# Patient Record
Sex: MALE | Race: WHITE | NOT HISPANIC OR LATINO | Employment: OTHER | URBAN - METROPOLITAN AREA
[De-identification: names, ages, dates, MRNs, and addresses within clinical notes are randomized per-mention and may not be internally consistent; named-entity substitution may affect disease eponyms.]

---

## 2017-03-15 ENCOUNTER — ANESTHESIA (OUTPATIENT)
Dept: GASTROENTEROLOGY | Facility: HOSPITAL | Age: 67
End: 2017-03-15

## 2017-03-15 ENCOUNTER — ANESTHESIA EVENT (OUTPATIENT)
Dept: GASTROENTEROLOGY | Facility: HOSPITAL | Age: 67
End: 2017-03-15

## 2017-03-15 ENCOUNTER — HOSPITAL ENCOUNTER (OUTPATIENT)
Facility: HOSPITAL | Age: 67
Setting detail: HOSPITAL OUTPATIENT SURGERY
Discharge: HOME | End: 2017-03-15
Attending: SURGERY | Admitting: SURGERY

## 2017-03-15 VITALS
BODY MASS INDEX: 44.2 KG/M2 | WEIGHT: 298.4 LBS | DIASTOLIC BLOOD PRESSURE: 78 MMHG | RESPIRATION RATE: 18 BRPM | HEIGHT: 69 IN | HEART RATE: 76 BPM | OXYGEN SATURATION: 98 % | SYSTOLIC BLOOD PRESSURE: 139 MMHG | TEMPERATURE: 98.3 F

## 2017-03-15 DIAGNOSIS — Z86.010 HISTORY OF COLON POLYPS: ICD-10-CM

## 2017-03-15 PROCEDURE — 25010000002 PROPOFOL 10 MG/ML EMULSION: Performed by: ANESTHESIOLOGY

## 2017-03-15 PROCEDURE — 45380 COLONOSCOPY AND BIOPSY: CPT | Performed by: SURGERY

## 2017-03-15 PROCEDURE — 88305 TISSUE EXAM BY PATHOLOGIST: CPT | Performed by: SURGERY

## 2017-03-15 PROCEDURE — 45385 COLONOSCOPY W/LESION REMOVAL: CPT | Performed by: SURGERY

## 2017-03-15 RX ORDER — LIDOCAINE HYDROCHLORIDE 20 MG/ML
INJECTION, SOLUTION INFILTRATION; PERINEURAL AS NEEDED
Status: DISCONTINUED | OUTPATIENT
Start: 2017-03-15 | End: 2017-03-15 | Stop reason: SURG

## 2017-03-15 RX ORDER — HYDROMORPHONE HYDROCHLORIDE 4 MG/1
4 TABLET ORAL EVERY 6 HOURS PRN
Status: ON HOLD | COMMUNITY
End: 2017-12-17

## 2017-03-15 RX ORDER — SODIUM CHLORIDE, SODIUM LACTATE, POTASSIUM CHLORIDE, CALCIUM CHLORIDE 600; 310; 30; 20 MG/100ML; MG/100ML; MG/100ML; MG/100ML
30 INJECTION, SOLUTION INTRAVENOUS CONTINUOUS PRN
Status: DISCONTINUED | OUTPATIENT
Start: 2017-03-15 | End: 2017-03-15 | Stop reason: HOSPADM

## 2017-03-15 RX ORDER — LEVOTHYROXINE SODIUM 0.03 MG/1
125 TABLET ORAL DAILY
COMMUNITY
End: 2017-12-17 | Stop reason: SDUPTHER

## 2017-03-15 RX ORDER — SERTRALINE HYDROCHLORIDE 100 MG/1
100 TABLET, FILM COATED ORAL DAILY
COMMUNITY

## 2017-03-15 RX ORDER — PROPOFOL 10 MG/ML
VIAL (ML) INTRAVENOUS AS NEEDED
Status: DISCONTINUED | OUTPATIENT
Start: 2017-03-15 | End: 2017-03-15 | Stop reason: SURG

## 2017-03-15 RX ORDER — HYDROCHLOROTHIAZIDE 25 MG/1
25 TABLET ORAL DAILY
COMMUNITY

## 2017-03-15 RX ORDER — TRAZODONE HYDROCHLORIDE 150 MG/1
150 TABLET ORAL NIGHTLY
COMMUNITY

## 2017-03-15 RX ORDER — SIMVASTATIN 20 MG
40 TABLET ORAL NIGHTLY
COMMUNITY
End: 2017-12-17 | Stop reason: SDUPTHER

## 2017-03-15 RX ORDER — ISOSORBIDE MONONITRATE 60 MG/1
30 TABLET, EXTENDED RELEASE ORAL DAILY
COMMUNITY
End: 2017-12-17 | Stop reason: SDUPTHER

## 2017-03-15 RX ORDER — PROBENECID 500 MG/1
500 TABLET, FILM COATED ORAL 2 TIMES DAILY
COMMUNITY

## 2017-03-15 RX ADMIN — LIDOCAINE HYDROCHLORIDE 100 MG: 20 INJECTION, SOLUTION INFILTRATION; PERINEURAL at 07:55

## 2017-03-15 RX ADMIN — PROPOFOL 300 MG: 10 INJECTION, EMULSION INTRAVENOUS at 07:55

## 2017-03-15 RX ADMIN — SODIUM CHLORIDE, POTASSIUM CHLORIDE, SODIUM LACTATE AND CALCIUM CHLORIDE 30 ML/HR: 600; 310; 30; 20 INJECTION, SOLUTION INTRAVENOUS at 07:52

## 2017-03-15 RX ADMIN — SODIUM CHLORIDE, POTASSIUM CHLORIDE, SODIUM LACTATE AND CALCIUM CHLORIDE: 600; 310; 30; 20 INJECTION, SOLUTION INTRAVENOUS at 07:55

## 2017-03-15 NOTE — PLAN OF CARE
Problem: Patient Care Overview (Adult)  Goal: Plan of Care Review  Outcome: Ongoing (interventions implemented as appropriate)    03/15/17 0711   Coping/Psychosocial Response Interventions   Plan Of Care Reviewed With patient   Patient Care Overview   Progress no change       Goal: Adult Individualization and Mutuality  Outcome: Ongoing (interventions implemented as appropriate)  Goal: Discharge Needs Assessment  Outcome: Ongoing (interventions implemented as appropriate)    03/15/17 0711   Discharge Needs Assessment   Concerns To Be Addressed no discharge needs identified   Discharge Disposition home or self-care   Living Environment   Transportation Available car         Problem: GI Endoscopy (Adult)  Goal: Signs and Symptoms of Listed Potential Problems Will be Absent or Manageable (GI Endoscopy)  Outcome: Ongoing (interventions implemented as appropriate)    03/15/17 0711   GI Endoscopy   Problems Assessed (GI Endoscopy) all   Problems Present (GI Endoscopy) none

## 2017-03-15 NOTE — ANESTHESIA PREPROCEDURE EVALUATION
Anesthesia Evaluation     Patient summary reviewed and Nursing notes reviewed      Airway   Mallampati: II  TM distance: >3 FB  Neck ROM: full  no difficulty expected  Dental - normal exam     Pulmonary - normal exam   (+) sleep apnea,   Cardiovascular - normal exam    (+) hypertension,       Neuro/Psych  GI/Hepatic/Renal/Endo    (+) obesity, morbid obesity,     Musculoskeletal     Abdominal  - normal exam   Substance History      OB/GYN          Other   (+) arthritis                                 Anesthesia Plan    ASA 3     MAC     intravenous induction   Anesthetic plan and risks discussed with patient.

## 2017-03-15 NOTE — OP NOTE
PREOPERATIVE DIAGNOSIS:  High-risk screening secondary to personal history of polyps  Diverticulosis    POSTOPERATIVE DIAGNOSIS AND FINDINGS:  7 mm polyp transverse colon  3 mm polyp rectum    PROCEDURE:  Colonoscopy to terminal ileum with snare polypectomy of transverse colon polyp and cold biopsy removal of rectal polyp    SURGEON:  Elian Carnes MD    ANESTHESIA:  MAC    DESCRIPTION:  In decubitus position digital rectal exam was normal. Colonoscope inserted under direct visualization of lumen to ileocolic anastomosis.  Anastomosis was traversed and terminal ileum was grossly normal.  Scope slowly withdrawn circumferentially examining all mucosal surfaces.  Polyp in the transverse colon proximally 7 mm removed with the cold snare and retrieved, good hemostasis at the site.  Scattered diverticulosis was seen in the sigmoid colon.  3 mm polyp in the rectum was removed with cold biopsy forceps.  Tolerated well.    RECOMMENDATION FOR FUTURE SURVEILLANCE:  To be determined based on polyp pathology and issued as separate report    Elian Carnes M.D.

## 2017-03-15 NOTE — ANESTHESIA POSTPROCEDURE EVALUATION
Patient: Nicanor TAN Arlt    Procedure Summary     Date Anesthesia Start Anesthesia Stop Room / Location    03/15/17 0756 0823  [facility] /  [facility]       Procedure Diagnosis Surgeon Provider    COLONOSCOPY TO ANASTAMOSIS AND TI WITH COLD SNARE POLYPECTOMY  (N/A ) History of colon polyps  (History of colon polyps [Z86.010]) MD Lai Hanna MD          Anesthesia Type: MAC  Last vitals  /67 (03/15/17 0740)    Temp 36.8 °C (98.3 °F) (03/15/17 0740)    Pulse 55 (03/15/17 0740)   Resp 16 (03/15/17 0740)    SpO2 97 % (03/15/17 0740)      Post Anesthesia Care and Evaluation    Patient location during evaluation: PACU  Patient participation: complete - patient participated  Level of consciousness: awake and alert  Pain management: adequate  Airway patency: patent  Anesthetic complications: No anesthetic complications    Cardiovascular status: acceptable  Respiratory status: acceptable  Hydration status: acceptable

## 2017-03-16 LAB
CYTO UR: NORMAL
LAB AP CASE REPORT: NORMAL
Lab: NORMAL
PATH REPORT.FINAL DX SPEC: NORMAL
PATH REPORT.GROSS SPEC: NORMAL

## 2017-03-17 ENCOUNTER — DOCUMENTATION (OUTPATIENT)
Dept: SURGERY | Facility: CLINIC | Age: 67
End: 2017-03-17

## 2017-03-17 NOTE — PROGRESS NOTES
Colonoscopy 3/15/17    Findings:   Transverse colon polyp-tubulovillous  Rectal polyp-hyperplastic    Recommendations: 3 year surveillance    Elian Carnes M.D.

## 2017-03-20 ENCOUNTER — TELEPHONE (OUTPATIENT)
Dept: SURGERY | Facility: CLINIC | Age: 67
End: 2017-03-20

## 2017-03-20 NOTE — TELEPHONE ENCOUNTER
----- Message from Elian Carnes MD sent at 3/17/2017 11:09 AM EDT -----  Please inform him that he had 2 benign polyps.  Based on the type of polyp (tubulovillous), however, he needs follow-up colonoscopy in 3 years-put in computer for reminder

## 2017-04-05 ENCOUNTER — APPOINTMENT (OUTPATIENT)
Dept: GENERAL RADIOLOGY | Facility: HOSPITAL | Age: 67
End: 2017-04-05

## 2017-04-05 ENCOUNTER — HOSPITAL ENCOUNTER (EMERGENCY)
Facility: HOSPITAL | Age: 67
Discharge: HOME | End: 2017-04-05
Attending: EMERGENCY MEDICINE | Admitting: EMERGENCY MEDICINE

## 2017-04-05 VITALS
HEIGHT: 69 IN | RESPIRATION RATE: 20 BRPM | TEMPERATURE: 98.1 F | BODY MASS INDEX: 42.21 KG/M2 | SYSTOLIC BLOOD PRESSURE: 115 MMHG | HEART RATE: 56 BPM | DIASTOLIC BLOOD PRESSURE: 60 MMHG | OXYGEN SATURATION: 98 % | WEIGHT: 285 LBS
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
## 2017-04-05 DIAGNOSIS — S22.31XA CLOSED FRACTURE OF ONE RIB OF RIGHT SIDE, INITIAL ENCOUNTER: Primary | ICD-10-CM

## 2017-04-05 LAB
BILIRUB UR QL STRIP: NEGATIVE
CLARITY UR: CLEAR
COLOR UR: YELLOW
GLUCOSE UR STRIP-MCNC: NEGATIVE MG/DL
HGB UR QL STRIP.AUTO: NEGATIVE
KETONES UR QL STRIP: NEGATIVE
LEUKOCYTE ESTERASE UR QL STRIP.AUTO: NEGATIVE
NITRITE UR QL STRIP: NEGATIVE
PH UR STRIP.AUTO: 5.5 [PH] (ref 5–8)
PROT UR QL STRIP: NEGATIVE
SP GR UR STRIP: 1.01 (ref 1–1.03)
UROBILINOGEN UR QL STRIP: NORMAL

## 2017-04-05 PROCEDURE — 72072 X-RAY EXAM THORAC SPINE 3VWS: CPT

## 2017-04-05 PROCEDURE — 71101 X-RAY EXAM UNILAT RIBS/CHEST: CPT

## 2017-04-05 PROCEDURE — 81003 URINALYSIS AUTO W/O SCOPE: CPT | Performed by: PHYSICIAN ASSISTANT

## 2017-04-05 PROCEDURE — 72110 X-RAY EXAM L-2 SPINE 4/>VWS: CPT

## 2017-04-05 PROCEDURE — 94799 UNLISTED PULMONARY SVC/PX: CPT

## 2017-04-05 PROCEDURE — 99284 EMERGENCY DEPT VISIT MOD MDM: CPT

## 2017-04-05 RX ORDER — OXYCODONE HYDROCHLORIDE AND ACETAMINOPHEN 5; 325 MG/1; MG/1
1 TABLET ORAL ONCE
Status: COMPLETED | OUTPATIENT
Start: 2017-04-05 | End: 2017-04-05

## 2017-04-05 RX ADMIN — OXYCODONE HYDROCHLORIDE AND ACETAMINOPHEN 1 TABLET: 5; 325 TABLET ORAL at 13:40

## 2017-04-05 NOTE — ED PROVIDER NOTES
"EMERGENCY DEPARTMENT ENCOUNTER    CHIEF COMPLAINT  Chief Complaint: Fall  History given by: Patient  History limited by: N/A  Room Number: 24/24  PMD: Ariel Betancourt MD      HPI:  Pt is a 67 y.o. male who presents with R flank and R rib pain after caro experienced a fall 4 days ago. Pt states that he was in the shower when he slipped and fell in the shower hitting his ribs on the side of the porcelain tub. Pt states that his PCP is out of town and he could no longer stand the pain. Pt states that his pain is worsened by deep breathing. Pt denies head trauma or LOC.    Duration: 4 days   Timing: Gradual  Location: R flank and R rib  Radiation: Does not radiate  Quality: \"pain\"  Intensity/Severity: Moderate  Progression: Worsening  Associated Symptoms: None  Aggravating Factors: Deep breathing  Alleviating Factors: None  Previous Episodes: None  Treatment before arrival: None    MEDICAL RECORD REVIEW  Hx of hypertension and bilateral knee replacements. Pt is not on any blood thinners but does take Dilaudid 4mg tabs every 4 hours. 3/15/2017 Dr. Carnes did a colonoscopy.    PAST MEDICAL HISTORY  Active Ambulatory Problems     Diagnosis Date Noted   • History of colon polyps 03/15/2017     Resolved Ambulatory Problems     Diagnosis Date Noted   • No Resolved Ambulatory Problems     Past Medical History:   Diagnosis Date   • Arthritis    • Disease of thyroid gland    • Gout    • Hyperlipemia    • Hypertension    • Sleep apnea        PAST SURGICAL HISTORY  Past Surgical History:   Procedure Laterality Date   • ANKLE SURGERY     • COLON SURGERY     • COLONOSCOPY N/A 3/15/2017    Procedure: COLONOSCOPY TO ANASTAMOSIS AND TI WITH COLD SNARE POLYPECTOMY ;  Surgeon: Elian Carnes MD;  Location: [facility] ENDOSCOPY;  Service:    • JOINT REPLACEMENT Bilateral     Knee   • KNEE SURGERY     • NOSE SURGERY         FAMILY HISTORY  History reviewed. No pertinent family history.    SOCIAL HISTORY  Social History     Social History   • " Marital status: Single     Spouse name: N/A   • Number of children: N/A   • Years of education: N/A     Occupational History   • Not on file.     Social History Main Topics   • Smoking status: Never Smoker   • Smokeless tobacco: Not on file   • Alcohol use No      Comment: socially   • Drug use: No   • Sexual activity: Defer     Other Topics Concern   • Not on file     Social History Narrative   • No narrative on file       ALLERGIES  Keflex [cephalexin]    REVIEW OF SYSTEMS  Review of Systems   Constitutional: Negative for chills and fever.   HENT: Negative for congestion and sore throat.    Respiratory: Negative for cough and shortness of breath.    Cardiovascular: Negative for chest pain and leg swelling.   Gastrointestinal: Negative for abdominal pain, diarrhea and vomiting.   Genitourinary: Positive for flank pain (R). Negative for decreased urine volume and dysuria.   Musculoskeletal: Negative for neck pain.        R rib pain   Skin: Negative for pallor and rash.   Neurological: Negative for weakness, numbness and headaches.   All other systems reviewed and are negative.      PHYSICAL EXAM  ED Triage Vitals   Temp Heart Rate Resp BP SpO2   04/05/17 0915 04/05/17 0915 04/05/17 0923 04/05/17 0922 04/05/17 0915   98.1 °F (36.7 °C) 67 18 113/74 94 %      Temp src Heart Rate Source Patient Position BP Location FiO2 (%)   04/05/17 0915 04/05/17 1201 -- -- --   Tympanic Monitor          Physical Exam   Constitutional: He is oriented to person, place, and time and well-developed, well-nourished, and in no distress. No distress.   HENT:   Head: Normocephalic and atraumatic.   Mouth/Throat: Oropharynx is clear and moist.   Eyes: EOM are normal. Pupils are equal, round, and reactive to light.   Conjunctiva are pink   Neck: Normal range of motion. Neck supple.   Cardiovascular: Normal rate, regular rhythm and normal heart sounds.    Pulmonary/Chest: Effort normal and breath sounds normal. No respiratory distress. He has  no wheezes. He exhibits tenderness (R lower lateral ribs).   Abdominal: Soft. He exhibits no distension. There is no tenderness. There is no rebound and no guarding.   No RUQ abdominal tenderness, gastric band palpated in epigastric area   Musculoskeletal: Normal range of motion. He exhibits no edema.        Cervical back: He exhibits no tenderness.        Thoracic back: He exhibits no tenderness.        Lumbar back: He exhibits no tenderness.   Positive R paraspinal tenderness   Lymphadenopathy:     He has no cervical adenopathy.   Neurological: He is alert and oriented to person, place, and time.   Skin: Skin is warm and dry. Bruising (purple to yellowish to R flank) noted. No rash noted. No pallor.   Psychiatric: Mood, memory, affect and judgment normal.   Nursing note and vitals reviewed.      LAB RESULTS  Recent Results (from the past 24 hour(s))   Urinalysis With / Culture If Indicated    Collection Time: 04/05/17  2:56 PM   Result Value Ref Range    Color, UA Yellow Yellow, Straw    Appearance, UA Clear Clear    pH, UA 5.5 5.0 - 8.0    Specific Gravity, UA 1.013 1.005 - 1.030    Glucose, UA Negative Negative    Ketones, UA Negative Negative    Bilirubin, UA Negative Negative    Blood, UA Negative Negative    Protein, UA Negative Negative    Leuk Esterase, UA Negative Negative    Nitrite, UA Negative Negative    Urobilinogen, UA 0.2 E.U./dL 0.2 - 1.0 E.U./dL       I ordered the above labs and reviewed the results.    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 No fracture or other posttraumatic deformity is evident.          XR Spine Lumbar 4+ View   Final Result      XR Ribs Right With PA Chest   Final Result        XR Lumbar Spine - Shows There is moderate disc space narrowing at all the lumbar disc levels is most prominent at L4-L5 and L5-S1. There are  prominent bridging osteophytes anteriorly at the L2 and L3-L4 levels. All of the vertebral bodies are normal in height. There is satisfactory alignment. There is no evidence of recent or old fracture. Interpreted by radiologist and reviewed by me.    XR Right Ribs - Shows An AP chest and multiple oblique views of the ribs of the right hemithorax were obtained. There is a subtle area of acute nondisplaced incomplete fracture through the anterior tip of the right eighth rib. No other fractures are identified. The lungs are fairly well-expanded and are free of infiltrates. There is no pneumothorax. Interpreted by radiologist and reviewed by me.    I ordered the above noted radiological studies and reviewed the images on the PACS system.      COURSE & MEDICAL DECISION MAKING  Pertinent Labs and Imaging studies that were ordered and reviewed are noted above.  Results were reviewed/discussed with the patient and they were also made aware of online assess.  Pt also made aware that some labs, such as cultures, will not be resulted during ER visit and follow up with PMD is necessary.       PROGRESS AND CONSULTS    Progress Notes:    1443- Reviewed pt's history and workup with Dr. Reno.  After a bedside evaluation; Dr. Reno agrees with the plan of care.    1550-  The patient's history, physical exam, and lab and image findings were discussed with the physician, who also performed a face to face history and physical exam.  I discussed all results and noted any abnormalities with patient.  Discussed absoute need to recheck abnormalities with their family physician.  I answered any of the patient's questions.  Discussed plan for discharge, as there is no emergent indication for admission.  Pt is agreeable and understands need for follow up and repeat testing.  Pt is aware that discharge does not mean that nothing is wrong but it indicates no emergency is present and they must continue care with their family physician.  Pt is  "discharged with instructions to follow up with primary care doctor to have their blood pressure rechecked.       MEDICATIONS GIVEN IN ER  Medications   oxyCODONE-acetaminophen (PERCOCET) 5-325 MG per tablet 1 tablet (1 tablet Oral Given 4/5/17 1340)       /65  Pulse 54  Temp 98.1 °F (36.7 °C) (Tympanic)   Resp 19  Ht 69\" (175.3 cm)  Wt 285 lb (129 kg)  SpO2 96%  BMI 42.09 kg/m2      DIAGNOSIS  Final diagnoses:   Closed fracture of one rib of right side, initial encounter   Fall at home, initial encounter       FOLLOW UP   Ariel Betancourt MD  [address] [address]  [phone]            RX     Medication List      Notice     No changes were made to your prescriptions during this visit.          I personally scribed for Poonam Piña PA-C on 4/5/2017 at 1:22 PM.  Electronically signed by Jonathan Mata on 4/5/2017 at time 1:22 PM     Jonathan Mata  04/05/17 2731       Poonam Piña PA-C  04/05/17 1551    "

## 2017-04-05 NOTE — DISCHARGE INSTRUCTIONS
PLEASE READ AND REVIEW ALL DISCHARGE INSTRUCTIONS.     Please follow up with your primary care physician for any further evaluation/treatment and further management of your blood pressure.     Recheck in emergency department for any worsening and/or concerning symptoms.     Take all prescribed medicine as written and continue chronic medication.    Use your incentive spirometer as directed.

## 2017-04-05 NOTE — ED NOTES
Pt states he fell Sunday in his bathroom, striking his R back/side on tub ledge. Denies hitting head.     Tino Hale RN  04/05/17 1866

## 2017-04-05 NOTE — ED PROVIDER NOTES
Pt states that about 3 days ago, he lost his balance and fell, injuring his right mid back on the side of his bathtub. He denies blow to head, loss of consciousness, neck pain, abd pain, anterior chest pain, lower back pain, bladder dysfunction, bowel dysfunction, sensory loss, motor loss, trouble breathing, nausea, vomiting, and sustaining any other injury.        Limited physical exam: bruising/TTpalp over the right lateral lower ribs, lungs are clear to auscultation bilaterally, abdomen: nontender to palpation      L-spine xray and t-spine xray both show no acute process. Right ribs and chest xray shows subtle area of acute nondisplaced incomplete fracture through the anterior tip of the right eighth rib and no pneumothorax. UA shows no hematuria. Plan is to discharge pt with incentive spirometry. He has dilaudid at home for pain. Will have pt to follow up with PMD for recheck. RTER warnings given.     I supervised care provided by the midlevel provider.  We have discussed this patient's history, physical exam, and treatment plan.  I have reviewed the note and personally saw and examined the patient and agree with the plan of care.    Documentation assistance provided by amari Schuler for Dr Reno. Information recorded by the scribe was done at my direction and has been verified and validated by me.        Ruth Schuler  04/05/17 1549       Brie Reno MD  04/06/17 7045

## 2017-11-06 ENCOUNTER — APPOINTMENT (OUTPATIENT)
Dept: CT IMAGING | Facility: HOSPITAL | Age: 67
End: 2017-11-06

## 2017-11-06 ENCOUNTER — APPOINTMENT (OUTPATIENT)
Dept: GENERAL RADIOLOGY | Facility: HOSPITAL | Age: 67
End: 2017-11-06

## 2017-11-06 ENCOUNTER — HOSPITAL ENCOUNTER (INPATIENT)
Facility: HOSPITAL | Age: 67
LOS: 5 days | Discharge: HOME | End: 2017-11-12
Attending: EMERGENCY MEDICINE | Admitting: SPECIALIST

## 2017-11-06 DIAGNOSIS — K80.50 CHOLEDOCHOLITHIASIS: ICD-10-CM

## 2017-11-06 DIAGNOSIS — K85.10 ACUTE PANCREATITIS DUE TO CALCULUS OF COMMON BILE DUCT: Primary | ICD-10-CM

## 2017-11-06 LAB
ALBUMIN SERPL-MCNC: 3.8 G/DL (ref 3.5–5.2)
ALBUMIN/GLOB SERPL: 1.1 G/DL
ALP SERPL-CCNC: 61 U/L (ref 39–117)
ALT SERPL W P-5'-P-CCNC: 88 U/L (ref 1–41)
ANION GAP SERPL CALCULATED.3IONS-SCNC: 14 MMOL/L
AST SERPL-CCNC: 183 U/L (ref 1–40)
BASOPHILS # BLD AUTO: 0 10*3/MM3 (ref 0–0.2)
BASOPHILS NFR BLD AUTO: 0 % (ref 0–1.5)
BILIRUB SERPL-MCNC: 0.9 MG/DL (ref 0.1–1.2)
BUN BLD-MCNC: 20 MG/DL (ref 8–23)
BUN/CREAT SERPL: 20.8 (ref 7–25)
CALCIUM SPEC-SCNC: 9.5 MG/DL (ref 8.6–10.5)
CHLORIDE SERPL-SCNC: 100 MMOL/L (ref 98–107)
CO2 SERPL-SCNC: 30 MMOL/L (ref 22–29)
CREAT BLD-MCNC: 0.96 MG/DL (ref 0.76–1.27)
DEPRECATED RDW RBC AUTO: 43.3 FL (ref 37–54)
EOSINOPHIL # BLD AUTO: 0.02 10*3/MM3 (ref 0–0.7)
EOSINOPHIL NFR BLD AUTO: 0.3 % (ref 0.3–6.2)
ERYTHROCYTE [DISTWIDTH] IN BLOOD BY AUTOMATED COUNT: 12.7 % (ref 11.5–14.5)
GFR SERPL CREATININE-BSD FRML MDRD: 78 ML/MIN/1.73
GLOBULIN UR ELPH-MCNC: 3.5 GM/DL
GLUCOSE BLD-MCNC: 146 MG/DL (ref 65–99)
HCT VFR BLD AUTO: 42.9 % (ref 40.4–52.2)
HGB BLD-MCNC: 14.1 G/DL (ref 13.7–17.6)
IMM GRANULOCYTES # BLD: 0.02 10*3/MM3 (ref 0–0.03)
IMM GRANULOCYTES NFR BLD: 0.3 % (ref 0–0.5)
LYMPHOCYTES # BLD AUTO: 0.76 10*3/MM3 (ref 0.9–4.8)
LYMPHOCYTES NFR BLD AUTO: 11.5 % (ref 19.6–45.3)
MCH RBC QN AUTO: 30.9 PG (ref 27–32.7)
MCHC RBC AUTO-ENTMCNC: 32.9 G/DL (ref 32.6–36.4)
MCV RBC AUTO: 94.1 FL (ref 79.8–96.2)
MONOCYTES # BLD AUTO: 0.5 10*3/MM3 (ref 0.2–1.2)
MONOCYTES NFR BLD AUTO: 7.5 % (ref 5–12)
NEUTROPHILS # BLD AUTO: 5.33 10*3/MM3 (ref 1.9–8.1)
NEUTROPHILS NFR BLD AUTO: 80.4 % (ref 42.7–76)
PLATELET # BLD AUTO: 120 10*3/MM3 (ref 140–500)
PMV BLD AUTO: 10.5 FL (ref 6–12)
POTASSIUM BLD-SCNC: 3.6 MMOL/L (ref 3.5–5.2)
PROT SERPL-MCNC: 7.3 G/DL (ref 6–8.5)
RBC # BLD AUTO: 4.56 10*6/MM3 (ref 4.6–6)
SODIUM BLD-SCNC: 144 MMOL/L (ref 136–145)
TROPONIN T SERPL-MCNC: <0.01 NG/ML (ref 0–0.03)
WBC NRBC COR # BLD: 6.63 10*3/MM3 (ref 4.5–10.7)

## 2017-11-06 PROCEDURE — 83690 ASSAY OF LIPASE: CPT | Performed by: EMERGENCY MEDICINE

## 2017-11-06 PROCEDURE — 85025 COMPLETE CBC W/AUTO DIFF WBC: CPT | Performed by: EMERGENCY MEDICINE

## 2017-11-06 PROCEDURE — 71020 HC CHEST PA AND LATERAL: CPT

## 2017-11-06 PROCEDURE — 84484 ASSAY OF TROPONIN QUANT: CPT | Performed by: EMERGENCY MEDICINE

## 2017-11-06 PROCEDURE — 25010000002 ONDANSETRON PER 1 MG: Performed by: EMERGENCY MEDICINE

## 2017-11-06 PROCEDURE — 93010 ELECTROCARDIOGRAM REPORT: CPT | Performed by: INTERNAL MEDICINE

## 2017-11-06 PROCEDURE — 80053 COMPREHEN METABOLIC PANEL: CPT | Performed by: EMERGENCY MEDICINE

## 2017-11-06 PROCEDURE — 99285 EMERGENCY DEPT VISIT HI MDM: CPT

## 2017-11-06 PROCEDURE — 74177 CT ABD & PELVIS W/CONTRAST: CPT

## 2017-11-06 PROCEDURE — 93005 ELECTROCARDIOGRAM TRACING: CPT | Performed by: EMERGENCY MEDICINE

## 2017-11-06 RX ORDER — ONDANSETRON 2 MG/ML
4 INJECTION INTRAMUSCULAR; INTRAVENOUS ONCE
Status: COMPLETED | OUTPATIENT
Start: 2017-11-06 | End: 2017-11-06

## 2017-11-06 RX ORDER — SODIUM CHLORIDE 0.9 % (FLUSH) 0.9 %
10 SYRINGE (ML) INJECTION AS NEEDED
Status: DISCONTINUED | OUTPATIENT
Start: 2017-11-06 | End: 2017-11-12 | Stop reason: HOSPADM

## 2017-11-06 RX ADMIN — ONDANSETRON 4 MG: 2 INJECTION INTRAMUSCULAR; INTRAVENOUS at 23:59

## 2017-11-07 PROBLEM — K85.10 ACUTE PANCREATITIS DUE TO CALCULUS OF COMMON BILE DUCT: Status: ACTIVE | Noted: 2017-11-07

## 2017-11-07 PROBLEM — R10.84 ABDOMINAL PAIN, GENERALIZED: Status: ACTIVE | Noted: 2017-11-07

## 2017-11-07 LAB
HOLD SPECIMEN: NORMAL
HOLD SPECIMEN: NORMAL
LIPASE SERPL-CCNC: >600 U/L (ref 13–60)
LIPASE SERPL-CCNC: >600 U/L (ref 13–60)
WHOLE BLOOD HOLD SPECIMEN: NORMAL
WHOLE BLOOD HOLD SPECIMEN: NORMAL

## 2017-11-07 PROCEDURE — 25010000002 HYDROMORPHONE PER 4 MG: Performed by: SPECIALIST

## 2017-11-07 PROCEDURE — 83690 ASSAY OF LIPASE: CPT | Performed by: SURGERY

## 2017-11-07 PROCEDURE — 25010000002 ONDANSETRON PER 1 MG: Performed by: SPECIALIST

## 2017-11-07 PROCEDURE — 25010000002 MORPHINE PER 10 MG: Performed by: EMERGENCY MEDICINE

## 2017-11-07 PROCEDURE — 0 IOPAMIDOL 61 % SOLUTION: Performed by: EMERGENCY MEDICINE

## 2017-11-07 PROCEDURE — 99222 1ST HOSP IP/OBS MODERATE 55: CPT | Performed by: SURGERY

## 2017-11-07 RX ORDER — PROBENECID 500 MG/1
500 TABLET, FILM COATED ORAL 2 TIMES DAILY
Status: DISCONTINUED | OUTPATIENT
Start: 2017-11-07 | End: 2017-11-12 | Stop reason: HOSPADM

## 2017-11-07 RX ORDER — LEVOTHYROXINE SODIUM 0.12 MG/1
125 TABLET ORAL EVERY MORNING
Status: DISCONTINUED | OUTPATIENT
Start: 2017-11-07 | End: 2017-11-07

## 2017-11-07 RX ORDER — ISOSORBIDE MONONITRATE 30 MG/1
30 TABLET, EXTENDED RELEASE ORAL DAILY
Status: DISCONTINUED | OUTPATIENT
Start: 2017-11-08 | End: 2017-11-07

## 2017-11-07 RX ORDER — LEVOTHYROXINE SODIUM 0.12 MG/1
125 TABLET ORAL EVERY MORNING
Status: DISCONTINUED | OUTPATIENT
Start: 2017-11-08 | End: 2017-11-07

## 2017-11-07 RX ORDER — DOCUSATE SODIUM 100 MG/1
100 CAPSULE, LIQUID FILLED ORAL 2 TIMES DAILY
Status: DISCONTINUED | OUTPATIENT
Start: 2017-11-07 | End: 2017-11-12 | Stop reason: HOSPADM

## 2017-11-07 RX ORDER — ONDANSETRON 2 MG/ML
4 INJECTION INTRAMUSCULAR; INTRAVENOUS EVERY 6 HOURS PRN
Status: DISCONTINUED | OUTPATIENT
Start: 2017-11-07 | End: 2017-11-08

## 2017-11-07 RX ORDER — SODIUM CHLORIDE 9 MG/ML
100 INJECTION, SOLUTION INTRAVENOUS CONTINUOUS
Status: DISCONTINUED | OUTPATIENT
Start: 2017-11-07 | End: 2017-11-09

## 2017-11-07 RX ORDER — ISOSORBIDE MONONITRATE 30 MG/1
30 TABLET, EXTENDED RELEASE ORAL DAILY
Status: DISCONTINUED | OUTPATIENT
Start: 2017-11-07 | End: 2017-11-12 | Stop reason: HOSPADM

## 2017-11-07 RX ORDER — ATORVASTATIN CALCIUM 10 MG/1
10 TABLET, FILM COATED ORAL DAILY
Status: DISCONTINUED | OUTPATIENT
Start: 2017-11-07 | End: 2017-11-12 | Stop reason: HOSPADM

## 2017-11-07 RX ORDER — MORPHINE SULFATE 2 MG/ML
2 INJECTION, SOLUTION INTRAMUSCULAR; INTRAVENOUS EVERY 4 HOURS PRN
Status: DISCONTINUED | OUTPATIENT
Start: 2017-11-07 | End: 2017-11-07

## 2017-11-07 RX ORDER — ACETAMINOPHEN 325 MG/1
650 TABLET ORAL EVERY 4 HOURS PRN
Status: DISCONTINUED | OUTPATIENT
Start: 2017-11-07 | End: 2017-11-12 | Stop reason: HOSPADM

## 2017-11-07 RX ORDER — ONDANSETRON 4 MG/1
4 TABLET, FILM COATED ORAL EVERY 6 HOURS PRN
Status: DISCONTINUED | OUTPATIENT
Start: 2017-11-07 | End: 2017-11-12 | Stop reason: HOSPADM

## 2017-11-07 RX ORDER — SODIUM CHLORIDE 0.9 % (FLUSH) 0.9 %
1-10 SYRINGE (ML) INJECTION AS NEEDED
Status: DISCONTINUED | OUTPATIENT
Start: 2017-11-07 | End: 2017-11-12 | Stop reason: HOSPADM

## 2017-11-07 RX ORDER — TRAZODONE HYDROCHLORIDE 50 MG/1
150 TABLET ORAL NIGHTLY
Status: DISCONTINUED | OUTPATIENT
Start: 2017-11-07 | End: 2017-11-12 | Stop reason: HOSPADM

## 2017-11-07 RX ORDER — ONDANSETRON 2 MG/ML
4 INJECTION INTRAMUSCULAR; INTRAVENOUS EVERY 6 HOURS PRN
Status: DISCONTINUED | OUTPATIENT
Start: 2017-11-07 | End: 2017-11-12 | Stop reason: HOSPADM

## 2017-11-07 RX ORDER — HYDROMORPHONE HYDROCHLORIDE 1 MG/ML
0.5 INJECTION, SOLUTION INTRAMUSCULAR; INTRAVENOUS; SUBCUTANEOUS EVERY 4 HOURS PRN
Status: DISCONTINUED | OUTPATIENT
Start: 2017-11-07 | End: 2017-11-11

## 2017-11-07 RX ORDER — ONDANSETRON 4 MG/1
4 TABLET, ORALLY DISINTEGRATING ORAL EVERY 6 HOURS PRN
Status: DISCONTINUED | OUTPATIENT
Start: 2017-11-07 | End: 2017-11-12 | Stop reason: HOSPADM

## 2017-11-07 RX ORDER — ONDANSETRON 2 MG/ML
4 INJECTION INTRAMUSCULAR; INTRAVENOUS ONCE
Status: DISCONTINUED | OUTPATIENT
Start: 2017-11-07 | End: 2017-11-07

## 2017-11-07 RX ORDER — HYDROCHLOROTHIAZIDE 25 MG/1
25 TABLET ORAL DAILY
Status: DISCONTINUED | OUTPATIENT
Start: 2017-11-07 | End: 2017-11-12 | Stop reason: HOSPADM

## 2017-11-07 RX ORDER — ISOSORBIDE MONONITRATE 30 MG/1
30 TABLET, EXTENDED RELEASE ORAL DAILY
Status: DISCONTINUED | OUTPATIENT
Start: 2017-11-07 | End: 2017-11-07

## 2017-11-07 RX ORDER — TRAZODONE HYDROCHLORIDE 150 MG/1
150 TABLET ORAL NIGHTLY
Status: DISCONTINUED | OUTPATIENT
Start: 2017-11-07 | End: 2017-11-07

## 2017-11-07 RX ORDER — SERTRALINE HYDROCHLORIDE 100 MG/1
100 TABLET, FILM COATED ORAL DAILY
Status: DISCONTINUED | OUTPATIENT
Start: 2017-11-07 | End: 2017-11-12 | Stop reason: HOSPADM

## 2017-11-07 RX ORDER — LEVOTHYROXINE SODIUM 0.12 MG/1
125 TABLET ORAL EVERY MORNING
Status: DISCONTINUED | OUTPATIENT
Start: 2017-11-07 | End: 2017-11-12 | Stop reason: HOSPADM

## 2017-11-07 RX ORDER — HYDROCHLOROTHIAZIDE 25 MG/1
25 TABLET ORAL DAILY
Status: DISCONTINUED | OUTPATIENT
Start: 2017-11-08 | End: 2017-11-07

## 2017-11-07 RX ORDER — HYDROCHLOROTHIAZIDE 25 MG/1
25 TABLET ORAL DAILY
Status: DISCONTINUED | OUTPATIENT
Start: 2017-11-07 | End: 2017-11-07

## 2017-11-07 RX ORDER — SERTRALINE HYDROCHLORIDE 100 MG/1
100 TABLET, FILM COATED ORAL DAILY
Status: DISCONTINUED | OUTPATIENT
Start: 2017-11-07 | End: 2017-11-07

## 2017-11-07 RX ADMIN — ISOSORBIDE MONONITRATE 30 MG: 30 TABLET ORAL at 17:11

## 2017-11-07 RX ADMIN — PROBENECID 500 MG: 500 TABLET, FILM COATED ORAL at 23:08

## 2017-11-07 RX ADMIN — Medication 10 ML: at 08:49

## 2017-11-07 RX ADMIN — HYDROMORPHONE HYDROCHLORIDE 0.5 MG: 1 INJECTION, SOLUTION INTRAMUSCULAR; INTRAVENOUS; SUBCUTANEOUS at 20:10

## 2017-11-07 RX ADMIN — SODIUM CHLORIDE 500 ML: 9 INJECTION, SOLUTION INTRAVENOUS at 00:00

## 2017-11-07 RX ADMIN — ONDANSETRON 4 MG: 2 INJECTION INTRAMUSCULAR; INTRAVENOUS at 14:28

## 2017-11-07 RX ADMIN — HYDROCHLOROTHIAZIDE 25 MG: 25 TABLET ORAL at 17:12

## 2017-11-07 RX ADMIN — LEVOTHYROXINE SODIUM 125 MCG: 125 TABLET ORAL at 17:17

## 2017-11-07 RX ADMIN — SODIUM CHLORIDE 100 ML/HR: 9 INJECTION, SOLUTION INTRAVENOUS at 18:52

## 2017-11-07 RX ADMIN — SERTRALINE 100 MG: 100 TABLET, FILM COATED ORAL at 17:11

## 2017-11-07 RX ADMIN — IOPAMIDOL 85 ML: 612 INJECTION, SOLUTION INTRAVENOUS at 00:11

## 2017-11-07 RX ADMIN — TRAZODONE HYDROCHLORIDE 150 MG: 50 TABLET ORAL at 23:08

## 2017-11-07 RX ADMIN — MORPHINE SULFATE 4 MG: 10 INJECTION, SOLUTION INTRAMUSCULAR; INTRAVENOUS at 00:00

## 2017-11-07 RX ADMIN — METOPROLOL TARTRATE 25 MG: 25 TABLET ORAL at 20:10

## 2017-11-07 RX ADMIN — HYDROMORPHONE HYDROCHLORIDE 0.5 MG: 1 INJECTION, SOLUTION INTRAMUSCULAR; INTRAVENOUS; SUBCUTANEOUS at 07:48

## 2017-11-07 RX ADMIN — DOCUSATE SODIUM 100 MG: 100 CAPSULE, LIQUID FILLED ORAL at 17:12

## 2017-11-07 RX ADMIN — SODIUM CHLORIDE 100 ML/HR: 9 INJECTION, SOLUTION INTRAVENOUS at 08:49

## 2017-11-07 RX ADMIN — HYDROMORPHONE HYDROCHLORIDE 0.5 MG: 1 INJECTION, SOLUTION INTRAMUSCULAR; INTRAVENOUS; SUBCUTANEOUS at 14:36

## 2017-11-07 NOTE — CONSULTS
SUMMARY (A/P):    67 year old man with gallstone pancreatitis, possible choledocholithiasis.   -recheck LFT's and lipase in am  -consult GI  -IV pain meds  -IV fluids  -eventually lap janelle      CC:  Abdominal pain    HPI:  Presents with severe epigastric pain starting yesterday evening, better now with narcotics. Associated with nausea.    PHYSICAL EXAM:   Constitutional: Well-developed well-nourished, no acute distress   Heart rate 67, /68, RR 20, T 97.6   Weight (pounds) 309   BMI 45.6   Height (inches) 69  Eyes: Conjunctiva normal, sclera nonicteric  ENMT: Hearing grossly normal, oral mucosa moist  Neck: Supple, no palpable mass, normal thyroid, trachea midline  Respiratory: Clear to auscultation, normal inspiratory effort  Cardiovascular: Regular rate, no murmur, no carotid bruit, no peripheral edema, no jugular venous distention  Gastrointestinal: Soft, moderately tender upper abdomen, no palpable mass, no hepatosplenomegaly, negative for hernia, bowel sounds normal  Lymphatics (palpable nodes):  cervical-negative, axillary-negative  Skin:  Warm, dry, no rash on visualized skin surfaces  Musculoskeletal: Symmetric strength, normal gait  Psychiatric: Alert and oriented ×3, normal affect     ALLERGIES: reviewed, in Epic    MEDICATIONS: reviewed, in Epic    PMH:    Hypertension  Hypothyroid  Hyperlipidemia  Gout   Arthritis  Sleep apnea    PSH:    Colonoscopy March 2017  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 Negative for unexpected weight loss.  All other systems reviewed and negative other than presenting complaints.    RADIOLOGY/ENDOSCOPY:    CT moderately severe pancreatitis, CBD distended 1.1 cm with possible CBD stone, gallstones. I reviewed images and concur    LABS:     Lipase >600  ALT 88, , T bili 0.9  WBC 6.63, Hgb 14.1    LUCILLE CORTEZ M.D.

## 2017-11-07 NOTE — PLAN OF CARE
Problem: Patient Care Overview (Adult)  Goal: Plan of Care Review  Outcome: Ongoing (interventions implemented as appropriate)    11/07/17 5801   Patient Care Overview   Progress improving   Outcome Evaluation   Outcome Summary/Follow up Plan VSS, consulted surgery & GI - yet to see, upgraded to CL diet pt tolerating, Dilaudid x 2 managing abdominal pain, zofran x 1, continue to monitor lipase    Coping/Psychosocial Response Interventions   Plan Of Care Reviewed With patient       Goal: Adult Individualization and Mutuality  Outcome: Ongoing (interventions implemented as appropriate)  Goal: Discharge Needs Assessment  Outcome: Ongoing (interventions implemented as appropriate)    Problem: Pain, Acute (Adult)  Goal: Identify Related Risk Factors and Signs and Symptoms  Outcome: Ongoing (interventions implemented as appropriate)  Goal: Acceptable Pain Control/Comfort Level  Outcome: Ongoing (interventions implemented as appropriate)    Problem: Fall Risk (Adult)  Goal: Identify Related Risk Factors and Signs and Symptoms  Outcome: Ongoing (interventions implemented as appropriate)  Goal: Absence of Falls  Outcome: Ongoing (interventions implemented as appropriate)

## 2017-11-07 NOTE — ED NOTES
Called #[phone] for second time. Transporter aware patient's room is ready upstairs.     Andie Wilhelm RN  11/07/17 0321       Andie Wilhelm RN  11/07/17 0320

## 2017-11-07 NOTE — PLAN OF CARE
Problem: Patient Care Overview (Adult)  Goal: Plan of Care Review  Outcome: Ongoing (interventions implemented as appropriate)    11/07/17 0523   Patient Care Overview   Progress no change   Outcome Evaluation   Outcome Summary/Follow up Plan Pt admitted from ER with abdominal pain, VSS, pt used urinal without difficulty, paged Dr Betancourt for orders, awaiting return call.          Problem: Pain, Acute (Adult)  Goal: Acceptable Pain Control/Comfort Level  Outcome: Ongoing (interventions implemented as appropriate)    Problem: Fall Risk (Adult)  Goal: Absence of Falls  Outcome: Ongoing (interventions implemented as appropriate)

## 2017-11-07 NOTE — ED PROVIDER NOTES
EMERGENCY DEPARTMENT ENCOUNTER    CHIEF COMPLAINT  Chief Complaint: Chest pain, abdominal pain  History given by: patient   History limited by: n/a  Room Number: 632/1  PMD: Ariel Betancourt MD      HPI:  Pt is a 67 y.o. male who presents complaining of lower mid sternal chest pain and epigastric pain that began tonight at 18:00. Pt states that the pain is worse with movement, inspiration, and palpation. Pt also complains of diaphoresis, chills, SOA, nausea, and vomiting. Pt states that he had a similar episode once previously, and he was seen by a cardiologist at that time.     Duration:  5 hours   Onset: gradual  Timing: constant   Location: lower mid sternal chest, epigastric pain  Radiation: none  Quality: pain  Intensity/Severity: moderate  Progression: unchanged  Associated Symptoms: diaphoresis, chills, SOA, nausea, vomiting  Aggravating Factors: movement, exertion, deep inspiration  Alleviating Factors: none  Previous Episodes: none  Treatment before arrival: none    PAST MEDICAL HISTORY  Active Ambulatory Problems     Diagnosis Date Noted   • History of colon polyps 03/15/2017     Resolved Ambulatory Problems     Diagnosis Date Noted   • No Resolved Ambulatory Problems     Past Medical History:   Diagnosis Date   • Arthritis    • Disease of thyroid gland    • Gout    • Hyperlipemia    • Hypertension    • Sleep apnea        PAST SURGICAL HISTORY  Past Surgical History:   Procedure Laterality Date   • ANKLE SURGERY     • COLON SURGERY     • COLONOSCOPY N/A 3/15/2017    Procedure: COLONOSCOPY TO ANASTAMOSIS AND TI WITH COLD SNARE POLYPECTOMY ;  Surgeon: Elian Carnes MD;  Location: [facility] ENDOSCOPY;  Service:    • JOINT REPLACEMENT Bilateral     Knee   • KNEE SURGERY     • NOSE SURGERY         FAMILY HISTORY  History reviewed. No pertinent family history.    SOCIAL HISTORY  Social History     Social History   • Marital status: Single     Spouse name: N/A   • Number of children: N/A   • Years of education: N/A      Occupational History   • Not on file.     Social History Main Topics   • Smoking status: Never Smoker   • Smokeless tobacco: Not on file   • Alcohol use No      Comment: socially   • Drug use: No   • Sexual activity: Defer     Other Topics Concern   • Not on file     Social History Narrative       ALLERGIES  Keflex [cephalexin]    REVIEW OF SYSTEMS  Review of Systems   Constitutional: Positive for chills and diaphoresis. Negative for fever.   HENT: Negative for congestion and sore throat.    Eyes: Negative.    Respiratory: Positive for shortness of breath. Negative for cough.    Cardiovascular: Positive for chest pain. Negative for leg swelling.   Gastrointestinal: Positive for abdominal pain, nausea and vomiting. Negative for diarrhea.   Genitourinary: Negative for difficulty urinating and dysuria.   Musculoskeletal: Negative for back pain and neck pain.   Skin: Negative for rash and wound.   Allergic/Immunologic: Negative.    Neurological: Negative for dizziness, weakness, numbness and headaches.   Psychiatric/Behavioral: Negative.    All other systems reviewed and are negative.      PHYSICAL EXAM  ED Triage Vitals   Temp Heart Rate Resp BP SpO2   11/06/17 2240 11/06/17 2240 11/06/17 2240 11/06/17 2240 11/06/17 2240   98.4 °F (36.9 °C) 62 24 109/62 95 %      Temp src Heart Rate Source Patient Position BP Location FiO2 (%)   -- -- -- -- --              Physical Exam   Constitutional: He is oriented to person, place, and time and well-developed, well-nourished, and in no distress.   HENT:   Head: Normocephalic and atraumatic.   Eyes: EOM are normal. Pupils are equal, round, and reactive to light.   Neck: Normal range of motion. Neck supple.   Cardiovascular: Normal rate, regular rhythm and normal heart sounds.    Pulmonary/Chest: Effort normal and breath sounds normal. No respiratory distress.   Abdominal: Soft. There is tenderness in the right upper quadrant, right lower quadrant and epigastric area. There is  no rebound and no guarding.   Musculoskeletal: Normal range of motion. He exhibits no edema.   Neurological: He is alert and oriented to person, place, and time. He has normal sensation and normal strength.   Skin: Skin is warm and dry.   Psychiatric: Mood and affect normal.   Nursing note and vitals reviewed.      LAB RESULTS  Lab Results (last 24 hours)     Procedure Component Value Units Date/Time    CBC & Differential [[id number]] Collected:  11/06/17 2312    Specimen:  Blood Updated:  11/06/17 2329    Narrative:       The following orders were created for panel order CBC & Differential.  Procedure                               Abnormality         Status                     ---------                               -----------         ------                     CBC Auto Differential[[id number]]         Abnormal            Final result                 Please view results for these tests on the individual orders.    Comprehensive Metabolic Panel [[id number]]  (Abnormal) Collected:  11/06/17 2312    Specimen:  Blood Updated:  11/06/17 2346     Glucose 146 (H) mg/dL      BUN 20 mg/dL      Creatinine 0.96 mg/dL      Sodium 144 mmol/L      Potassium 3.6 mmol/L      Chloride 100 mmol/L      CO2 30.0 (H) mmol/L      Calcium 9.5 mg/dL      Total Protein 7.3 g/dL      Albumin 3.80 g/dL      ALT (SGPT) 88 (H) U/L      AST (SGOT) 183 (H) U/L      Alkaline Phosphatase 61 U/L      Total Bilirubin 0.9 mg/dL      eGFR Non African Amer 78 mL/min/1.73      Globulin 3.5 gm/dL      A/G Ratio 1.1 g/dL      BUN/Creatinine Ratio 20.8     Anion Gap 14.0 mmol/L     Troponin [[id number]]  (Normal) Collected:  11/06/17 2312    Specimen:  Blood Updated:  11/06/17 2346     Troponin T <0.010 ng/mL     Narrative:       Troponin T Reference Ranges:  Less than 0.03 ng/mL:    Negative for AMI  0.03 to 0.09 ng/mL:      Indeterminant for AMI  Greater than 0.09 ng/mL: Positive for AMI    CBC Auto Differential [[id number]]  (Abnormal) Collected:  11/06/17 2312     Specimen:  Blood Updated:  11/06/17 2329     WBC 6.63 10*3/mm3      RBC 4.56 (L) 10*6/mm3      Hemoglobin 14.1 g/dL      Hematocrit 42.9 %      MCV 94.1 fL      MCH 30.9 pg      MCHC 32.9 g/dL      RDW 12.7 %      RDW-SD 43.3 fl      MPV 10.5 fL      Platelets 120 (L) 10*3/mm3      Neutrophil % 80.4 (H) %      Lymphocyte % 11.5 (L) %      Monocyte % 7.5 %      Eosinophil % 0.3 %      Basophil % 0.0 %      Immature Grans % 0.3 %      Neutrophils, Absolute 5.33 10*3/mm3      Lymphocytes, Absolute 0.76 (L) 10*3/mm3      Monocytes, Absolute 0.50 10*3/mm3      Eosinophils, Absolute 0.02 10*3/mm3      Basophils, Absolute 0.00 10*3/mm3      Immature Grans, Absolute 0.02 10*3/mm3     Lipase [[id number]]  (Abnormal) Collected:  11/06/17 2312    Specimen:  Blood Updated:  11/07/17 0016     Lipase >600 (H) U/L           I ordered the above labs and reviewed the results    RADIOLOGY  CT Abdomen Pelvis With Contrast   Preliminary Result   Moderately severe pancreatitis, no pseudocyst is seen. Common bile duct   is distended to 1.1 cm, in the distal common bile there may be a 0.3 cm   stone. MRCP is recommended. Few gallstones measuring up to 0.3 cm                    XR Chest 2 View   Preliminary Result   No acute findings.                   I ordered the above noted radiological studies. Interpreted by radiologist. Reviewed by me in PACS.       PROCEDURES  Procedures    EKG           EKG time: 2310  Rhythm/Rate: Sins bradycardia 59  P waves and NC: nml  QRS, axis: nml   ST and T waves: nml     Interpreted Contemporaneously by me, independently viewed  Unchanged compared to prior 9/2014    PROGRESS AND CONSULTS  ED Course     22;43  Troponin, CMP, CBC, CXR, and EKG ordered.     23:24  BP- 127/70 HR- 60 Temp- 98.4 °F (36.9 °C) O2 sat- 95%  Advised pt that the EKG shows NAD. Plan for labs. Suspect pain is not cardiac in nature, but plan to r/o cardiac etiology. Pt understands and agrees with the plan, all questions  answered.    23:38  IVF ordered for hydration. CT abd/pelvis ordered. Morphine and Zofran ordered to treat pain and nausea.    01:20  Call placed to Dr Betancourt for admission.     01:48  BP- (!) 159/105 HR- 60 Temp- 98.4 °F (36.9 °C) O2 sat- 94%  Rechecked the patient who is in NAD and is resting comfortably. Advised pt and family that the CT and labs are consistent with pancreatitis. Informed him of the plan for admission for GI consult. Pt understands and agrees with the plan, all questions answered.    02:19  Discussed pt's case with Dr Betancourt (pt' PMD), who agrees to admit the pt.     MEDICAL DECISION MAKING  Results were reviewed/discussed with the patient and they were also made aware of online access. Pt also made aware that some labs, such as cultures, will not be resulted during ER visit and follow up with PMD is necessary.     MDM  Number of Diagnoses or Management Options  Acute pancreatitis due to calculus of common bile duct:   Choledocholithiasis:      Amount and/or Complexity of Data Reviewed  Clinical lab tests: ordered and reviewed (Lipase >600. Alt=88, Exh=040)  Tests in the radiology section of CPT®: ordered and reviewed (CXR shows NAD.   CT abd/pelvis shows Moderately severe pancreatitis, no pseudocyst is seen. Common bile duct is distended to 1.1 cm, in the distal common bile there may be a 0.3 cm  stone.)  Tests in the medicine section of CPT®: ordered and reviewed (See EKG procedure note. )  Discuss the patient with other providers: yes (Dr Betancourt, pt's PMD)    Patient Progress  Patient progress: stable         DIAGNOSIS  Final diagnoses:   Acute pancreatitis due to calculus of common bile duct   Choledocholithiasis       DISPOSITION  ADMISSION    Discussed treatment plan and reason for admission with pt/family and admitting physician.  Pt/family voiced understanding of the plan for admission for further testing/treatment as needed.     Latest Documented Vital Signs:  As of 7:03 AM  BP- 142/78  HR- 61 Temp- 97.5 °F (36.4 °C) (Oral) O2 sat- 92%    --  Documentation assistance provided by amari Herndon for Dr Newell.  Information recorded by the amari was done at my direction and has been verified and validated by me.       Giovana Herndon  11/07/17 0221       Sergey Newell MD  11/07/17 0703

## 2017-11-07 NOTE — H&P
History and Physical  Ariel Betancourt MD    Name: Nicanor Haley ADMIT: 2017   : 1950  PCP: Ariel Betancourt MD    MRN: [id number] LOS: 0 days   AGE/SEX: 67 y.o. male  ROOM: 2/     Chief Complaint   Patient presents with   • Chest Pain     asa 324mg, ntg sl x1       Subjective   Patient is a 67 y.o. male presents with the following...    History of Present Illness    The patient is a 67 y.o. male who presents complaining of lower mid sternal chest pain and epigastric pain that began last night at 18:00. Pt states that the pain is worse with movement, inspiration, and palpation. Pt also complains of diaphoresis, chills, SOA, nausea, and vomiting. Pt states that he had a similar episode once previously, and he was seen by a cardiologist at that time. CT scan abdomen revealed moderately large gallstone in the common bile duct.We started iv fluids and pain control NPO and I contacted Dr Carneshis surgeon to consult.Admitted to the floor        Past Medical History:   Diagnosis Date   • Arthritis    • Disease of thyroid gland    • Gout    • Hyperlipemia    • Hypertension    • Sleep apnea      Past Surgical History:   Procedure Laterality Date   • ANKLE SURGERY     • COLON SURGERY     • COLONOSCOPY N/A 3/15/2017    Procedure: COLONOSCOPY TO ANASTAMOSIS AND TI WITH COLD SNARE POLYPECTOMY ;  Surgeon: Elian Carnes MD;  Location: [facility] ENDOSCOPY;  Service:    • JOINT REPLACEMENT Bilateral     Knee   • KNEE SURGERY     • NOSE SURGERY       History reviewed. No pertinent family history.  Social History   Substance Use Topics   • Smoking status: Never Smoker   • Smokeless tobacco: None   • Alcohol use No      Comment: socially     Prescriptions Prior to Admission   Medication Sig Dispense Refill Last Dose   • calcium carbonate-cholecalciferol 500-400 MG-UNIT tablet tablet Take 1 tablet by mouth Daily.   2017 at Unknown time   • hydrochlorothiazide (HYDRODIURIL) 25 MG tablet Take 25 mg by mouth Daily.    11/6/2017 at Unknown time   • HYDROmorphone (DILAUDID) 4 MG tablet Take 4 mg by mouth Every 4 (Four) Hours As Needed for Moderate Pain (4-6).   11/6/2017 at Unknown time   • isosorbide mononitrate (IMDUR) 60 MG 24 hr tablet Take 30 mg by mouth Daily.   11/6/2017 at Unknown time   • levothyroxine (SYNTHROID, LEVOTHROID) 25 MCG tablet Take 125 mcg by mouth Daily.   11/6/2017 at Unknown time   • metoprolol tartrate (LOPRESSOR) 25 MG tablet Take 25 mg by mouth 2 (Two) Times a Day. Pt unsure of dose   11/6/2017 at Unknown time   • probenecid (BENEMID) 500 MG tablet Take 500 mg by mouth 2 (Two) Times a Day.   11/6/2017 at Unknown time   • sertraline (ZOLOFT) 100 MG tablet Take 100 mg by mouth Daily. Pt unsure of dose   11/6/2017 at Unknown time   • simvastatin (ZOCOR) 20 MG tablet Take 20 mg by mouth Every Night. Pt unsure of dose   Past Week at Unknown time   • traZODone (DESYREL) 150 MG tablet Take 150 mg by mouth Every Night.   Past Week at Unknown time     Allergies:  Keflex [cephalexin]    Review of Systems     Reviewed all 14 of the body systems  Abnormals per HPI    Objective    Vital Signs  Temp:  [97.5 °F (36.4 °C)-98.4 °F (36.9 °C)] 97.5 °F (36.4 °C)  Heart Rate:  [57-62] 61  Resp:  [20-24] 20  BP: (109-162)/() 142/78  SpO2:  [92 %-96 %] 92 %  on   ;   O2 Device: room air  Body mass index is 45.66 kg/(m^2).    Physical Exam     Constitutional: He is oriented to person, place, and time and well-developed, well-nourished, and in no distress.   HENT:   Head: Normocephalic and atraumatic.   Eyes: EOM are normal. Pupils are equal, round, and reactive to light.   Neck: Normal range of motion. Neck supple.   Cardiovascular: Normal rate, regular rhythm and normal heart sounds.    Pulmonary/Chest: Effort normal and breath sounds normal. No respiratory distress.   Abdominal: Soft. There is tenderness in the right upper quadrant, right lower quadrant and epigastric area. There is no rebound and no guarding.    Musculoskeletal: Normal range of motion. He exhibits no edema.   Neurological: He is alert and oriented to person, place, and time. He has normal sensation and normal strength.   Skin: Skin is warm and dry.   Psychiatric: Mood and affect normal.   Nursing note and vitals reviewed.        Results Review:   I reviewed the patient's new clinical results.    Assessment/Plan     Active Problems:    Acute pancreatitis due to calculus of common bile duct   Hx early detected and resected Colon Cancer   Post Polyo Syndrome    Assessment & Plan    See full set of orders    I discussed the patients findings and my recommendations with patient.          Ariel Betancourt MD  11/07/17  7:06 AM

## 2017-11-08 LAB
ALBUMIN SERPL-MCNC: 3.3 G/DL (ref 3.5–5.2)
ALBUMIN/GLOB SERPL: 1 G/DL
ALP SERPL-CCNC: 48 U/L (ref 39–117)
ALT SERPL W P-5'-P-CCNC: 64 U/L (ref 1–41)
AMYLASE SERPL-CCNC: 286 U/L (ref 28–100)
ANION GAP SERPL CALCULATED.3IONS-SCNC: 10.3 MMOL/L
AST SERPL-CCNC: 34 U/L (ref 1–40)
BASOPHILS # BLD AUTO: 0.01 10*3/MM3 (ref 0–0.2)
BASOPHILS NFR BLD AUTO: 0.1 % (ref 0–1.5)
BILIRUB SERPL-MCNC: 0.9 MG/DL (ref 0.1–1.2)
BUN BLD-MCNC: 14 MG/DL (ref 8–23)
BUN/CREAT SERPL: 16.9 (ref 7–25)
CALCIUM SPEC-SCNC: 9 MG/DL (ref 8.6–10.5)
CHLORIDE SERPL-SCNC: 100 MMOL/L (ref 98–107)
CO2 SERPL-SCNC: 29.7 MMOL/L (ref 22–29)
CREAT BLD-MCNC: 0.83 MG/DL (ref 0.76–1.27)
DEPRECATED RDW RBC AUTO: 45.3 FL (ref 37–54)
EOSINOPHIL # BLD AUTO: 0.14 10*3/MM3 (ref 0–0.7)
EOSINOPHIL NFR BLD AUTO: 1.6 % (ref 0.3–6.2)
ERYTHROCYTE [DISTWIDTH] IN BLOOD BY AUTOMATED COUNT: 13 % (ref 11.5–14.5)
GFR SERPL CREATININE-BSD FRML MDRD: 92 ML/MIN/1.73
GLOBULIN UR ELPH-MCNC: 3.3 GM/DL
GLUCOSE BLD-MCNC: 102 MG/DL (ref 65–99)
HCT VFR BLD AUTO: 42 % (ref 40.4–52.2)
HGB BLD-MCNC: 13.7 G/DL (ref 13.7–17.6)
IMM GRANULOCYTES # BLD: 0.02 10*3/MM3 (ref 0–0.03)
IMM GRANULOCYTES NFR BLD: 0.2 % (ref 0–0.5)
LIPASE SERPL-CCNC: 140 U/L (ref 13–60)
LYMPHOCYTES # BLD AUTO: 0.85 10*3/MM3 (ref 0.9–4.8)
LYMPHOCYTES NFR BLD AUTO: 10 % (ref 19.6–45.3)
MCH RBC QN AUTO: 31.1 PG (ref 27–32.7)
MCHC RBC AUTO-ENTMCNC: 32.6 G/DL (ref 32.6–36.4)
MCV RBC AUTO: 95.5 FL (ref 79.8–96.2)
MONOCYTES # BLD AUTO: 0.92 10*3/MM3 (ref 0.2–1.2)
MONOCYTES NFR BLD AUTO: 10.8 % (ref 5–12)
NEUTROPHILS # BLD AUTO: 6.6 10*3/MM3 (ref 1.9–8.1)
NEUTROPHILS NFR BLD AUTO: 77.3 % (ref 42.7–76)
PLATELET # BLD AUTO: 113 10*3/MM3 (ref 140–500)
PMV BLD AUTO: 10.4 FL (ref 6–12)
POTASSIUM BLD-SCNC: 3.5 MMOL/L (ref 3.5–5.2)
PROT SERPL-MCNC: 6.6 G/DL (ref 6–8.5)
RBC # BLD AUTO: 4.4 10*6/MM3 (ref 4.6–6)
SODIUM BLD-SCNC: 140 MMOL/L (ref 136–145)
WBC NRBC COR # BLD: 8.54 10*3/MM3 (ref 4.5–10.7)

## 2017-11-08 PROCEDURE — 85025 COMPLETE CBC W/AUTO DIFF WBC: CPT | Performed by: SPECIALIST

## 2017-11-08 PROCEDURE — 80053 COMPREHEN METABOLIC PANEL: CPT | Performed by: SPECIALIST

## 2017-11-08 PROCEDURE — 25010000002 ONDANSETRON PER 1 MG: Performed by: SPECIALIST

## 2017-11-08 PROCEDURE — 83690 ASSAY OF LIPASE: CPT | Performed by: SURGERY

## 2017-11-08 PROCEDURE — 25010000002 HYDROMORPHONE PER 4 MG: Performed by: SPECIALIST

## 2017-11-08 PROCEDURE — 82150 ASSAY OF AMYLASE: CPT | Performed by: INTERNAL MEDICINE

## 2017-11-08 PROCEDURE — 99222 1ST HOSP IP/OBS MODERATE 55: CPT | Performed by: INTERNAL MEDICINE

## 2017-11-08 RX ADMIN — ONDANSETRON 4 MG: 2 INJECTION INTRAMUSCULAR; INTRAVENOUS at 23:21

## 2017-11-08 RX ADMIN — HYDROMORPHONE HYDROCHLORIDE 0.5 MG: 1 INJECTION, SOLUTION INTRAMUSCULAR; INTRAVENOUS; SUBCUTANEOUS at 13:08

## 2017-11-08 RX ADMIN — HYDROMORPHONE HYDROCHLORIDE 0.5 MG: 1 INJECTION, SOLUTION INTRAMUSCULAR; INTRAVENOUS; SUBCUTANEOUS at 17:26

## 2017-11-08 RX ADMIN — HYDROCHLOROTHIAZIDE 25 MG: 25 TABLET ORAL at 08:40

## 2017-11-08 RX ADMIN — SODIUM CHLORIDE 100 ML/HR: 9 INJECTION, SOLUTION INTRAVENOUS at 05:41

## 2017-11-08 RX ADMIN — DOCUSATE SODIUM 100 MG: 100 CAPSULE, LIQUID FILLED ORAL at 17:26

## 2017-11-08 RX ADMIN — SERTRALINE 100 MG: 100 TABLET, FILM COATED ORAL at 08:40

## 2017-11-08 RX ADMIN — METOPROLOL TARTRATE 25 MG: 25 TABLET ORAL at 08:40

## 2017-11-08 RX ADMIN — TRAZODONE HYDROCHLORIDE 150 MG: 50 TABLET ORAL at 20:51

## 2017-11-08 RX ADMIN — PROBENECID 500 MG: 500 TABLET, FILM COATED ORAL at 08:40

## 2017-11-08 RX ADMIN — PROBENECID 500 MG: 500 TABLET, FILM COATED ORAL at 17:26

## 2017-11-08 RX ADMIN — HYDROMORPHONE HYDROCHLORIDE 0.5 MG: 1 INJECTION, SOLUTION INTRAMUSCULAR; INTRAVENOUS; SUBCUTANEOUS at 23:21

## 2017-11-08 RX ADMIN — ISOSORBIDE MONONITRATE 30 MG: 30 TABLET ORAL at 08:40

## 2017-11-08 RX ADMIN — DOCUSATE SODIUM 100 MG: 100 CAPSULE, LIQUID FILLED ORAL at 08:40

## 2017-11-08 RX ADMIN — HYDROMORPHONE HYDROCHLORIDE 0.5 MG: 1 INJECTION, SOLUTION INTRAMUSCULAR; INTRAVENOUS; SUBCUTANEOUS at 03:14

## 2017-11-08 RX ADMIN — SODIUM CHLORIDE 100 ML/HR: 9 INJECTION, SOLUTION INTRAVENOUS at 16:19

## 2017-11-08 RX ADMIN — ONDANSETRON 4 MG: 2 INJECTION INTRAMUSCULAR; INTRAVENOUS at 17:26

## 2017-11-08 RX ADMIN — HYDROMORPHONE HYDROCHLORIDE 0.5 MG: 1 INJECTION, SOLUTION INTRAMUSCULAR; INTRAVENOUS; SUBCUTANEOUS at 09:04

## 2017-11-08 RX ADMIN — ATORVASTATIN CALCIUM 10 MG: 10 TABLET, FILM COATED ORAL at 08:40

## 2017-11-08 RX ADMIN — ACETAMINOPHEN 650 MG: 325 TABLET ORAL at 16:22

## 2017-11-08 RX ADMIN — LEVOTHYROXINE SODIUM 125 MCG: 125 TABLET ORAL at 07:43

## 2017-11-08 RX ADMIN — METOPROLOL TARTRATE 25 MG: 25 TABLET ORAL at 20:51

## 2017-11-08 RX ADMIN — ONDANSETRON 4 MG: 2 INJECTION INTRAMUSCULAR; INTRAVENOUS at 11:10

## 2017-11-08 NOTE — PLAN OF CARE
Problem: Patient Care Overview (Adult)  Goal: Plan of Care Review  Outcome: Outcome(s) achieved Date Met:  11/08/17 11/08/17 0453   Patient Care Overview   Progress improving   Outcome Evaluation   Outcome Summary/Follow up Plan C/o abd pain. Pain med given x2 during. VSS. Will continue to monitor.   Coping/Psychosocial Response Interventions   Plan Of Care Reviewed With patient       Goal: Adult Individualization and Mutuality  Outcome: Ongoing (interventions implemented as appropriate)  Goal: Discharge Needs Assessment  Outcome: Ongoing (interventions implemented as appropriate)    Problem: Pain, Acute (Adult)  Goal: Identify Related Risk Factors and Signs and Symptoms  Outcome: Ongoing (interventions implemented as appropriate)  Goal: Acceptable Pain Control/Comfort Level  Outcome: Ongoing (interventions implemented as appropriate)    Problem: Fall Risk (Adult)  Goal: Identify Related Risk Factors and Signs and Symptoms  Outcome: Ongoing (interventions implemented as appropriate)  Goal: Absence of Falls  Outcome: Ongoing (interventions implemented as appropriate)

## 2017-11-08 NOTE — CONSULTS
[facility], [facility]  Initial Inpatient Consult Note    Referring Provider: Dr. Elian Carnes    Reason for Consultation: Gallstone pancreatitis    Subjective     History of present illness:    67 y.o. male with history of hypertension, hyperlipidemia, osteoarthritis and sleep apnea presents with acute onset abdominal pain.  Patient reports pain radiates across the entire upper abdomen more so in the right upper quadrant and less so in the left upper quadrant.  Patient works pain associated with nausea but no vomiting.  Patient reports no change in bowel habits no melena or bright red blood per rectum.  Patient reports his first episode of this kind of pain is had.  Patient currently reports still very sore but somewhat less than when he was admitted.  Patient now for further evaluation.    Past Medical History:  Past Medical History:   Diagnosis Date   • Arthritis    • Disease of thyroid gland    • Gout    • Hyperlipemia    • Hypertension    • Sleep apnea      Past Surgical History:  Past Surgical History:   Procedure Laterality Date   • ANKLE SURGERY     • COLON SURGERY     • COLONOSCOPY N/A 3/15/2017    Procedure: COLONOSCOPY TO ANASTAMOSIS AND TI WITH COLD SNARE POLYPECTOMY ;  Surgeon: Elian Carnes MD;  Location: [facility] ENDOSCOPY;  Service:    • JOINT REPLACEMENT Bilateral     Knee   • KNEE SURGERY     • NOSE SURGERY        Social History:   Social History   Substance Use Topics   • Smoking status: Never Smoker   • Smokeless tobacco: Not on file   • Alcohol use No      Comment: socially      Family History:  History reviewed. No pertinent family history.    Home Meds:  Prescriptions Prior to Admission   Medication Sig Dispense Refill Last Dose   • calcium carbonate-cholecalciferol 500-400 MG-UNIT tablet tablet Take 1 tablet by mouth Daily.   11/6/2017 at Unknown time   • hydrochlorothiazide (HYDRODIURIL) 25 MG tablet Take 25 mg by mouth Daily.   11/6/2017 at Unknown time   • HYDROmorphone  (DILAUDID) 4 MG tablet Take 4 mg by mouth Every 4 (Four) Hours As Needed for Moderate Pain (4-6).   11/6/2017 at Unknown time   • isosorbide mononitrate (IMDUR) 60 MG 24 hr tablet Take 30 mg by mouth Daily.   11/6/2017 at Unknown time   • levothyroxine (SYNTHROID, LEVOTHROID) 25 MCG tablet Take 125 mcg by mouth Daily.   11/6/2017 at Unknown time   • metoprolol tartrate (LOPRESSOR) 25 MG tablet Take 25 mg by mouth 2 (Two) Times a Day. Pt unsure of dose   11/6/2017 at Unknown time   • probenecid (BENEMID) 500 MG tablet Take 500 mg by mouth 2 (Two) Times a Day.   11/6/2017 at Unknown time   • sertraline (ZOLOFT) 100 MG tablet Take 100 mg by mouth Daily. Pt unsure of dose   11/6/2017 at Unknown time   • simvastatin (ZOCOR) 20 MG tablet Take 40 mg by mouth Every Night. Pt unsure of dose    Past Week at Unknown time   • traZODone (DESYREL) 150 MG tablet Take 150 mg by mouth Every Night.   Past Week at Unknown time     Current Meds:     atorvastatin 10 mg Oral Daily   docusate sodium 100 mg Oral BID   hydrochlorothiazide 25 mg Oral Daily   isosorbide mononitrate 30 mg Oral Daily   levothyroxine 125 mcg Oral QAM   metoprolol tartrate 25 mg Oral Q12H   probenecid 500 mg Oral BID   sertraline 100 mg Oral Daily   traZODone 150 mg Oral Nightly     Allergies:  Allergies   Allergen Reactions   • Keflex [Cephalexin]      Review of Systems  All systems were reviewed and negative except for:  Gastrointestinal: postitive for  nausea and pain     Objective     Vital Signs  Temp:  [97.4 °F (36.3 °C)-99.3 °F (37.4 °C)] 98.9 °F (37.2 °C)  Heart Rate:  [66-75] 75  Resp:  [20] 20  BP: (129-146)/(68-82) 146/72  Physical Exam:  General Appearance:    Alert, cooperative, in no acute distress   Head:    Normocephalic, without obvious abnormality, atraumatic   Eyes:            Lids and lashes normal, conjunctivae and sclerae normal, no   icterus   Throat:   No oral lesions, no thrush, oral mucosa moist   Neck:   No adenopathy, supple, trachea  midline, no thyromegaly, no   carotid bruit, no JVD   Lungs:     Clear to auscultation,respirations regular, even and                   unlabored    Heart:    Regular rhythm and normal rate, normal S1 and S2, no            murmur, no gallop, no rub, no click   Chest Wall:    No abnormalities observed   Abdomen:     Normal bowel sounds, no masses, no organomegaly, soft        non-tender, non-distended, no guarding, no rebound                 tenderness   Rectal:     Deferred   Extremities:   no edema, no cyanosis, no redness   Skin:   No bleeding, bruising or rash   Lymph nodes:   No palpable adenopathy   Psychiatric:  Judgement and insight: normal   Orientation to person place and time: normal   Mood and affect: normal   Results Review:   I reviewed the patient's new clinical results.   My review the CT patient noted for 6 mm common duct with no dilation.  Possible calcification and lumen diameter described as dilated biliary tree looks to be actually in the duodenum.  Reviewed with additional radiologist who concurs no apparent obstruction or dilation of the intra-or extrahepatic ducts.      Results from last 7 days  Lab Units 11/08/17 0625 11/06/17  2312   WBC 10*3/mm3 8.54 6.63   HEMOGLOBIN g/dL 13.7 14.1   HEMATOCRIT % 42.0 42.9   PLATELETS 10*3/mm3 113* 120*       Results from last 7 days  Lab Units 11/08/17  0625 11/06/17  2312   SODIUM mmol/L 140 144   POTASSIUM mmol/L 3.5 3.6   CHLORIDE mmol/L 100 100   CO2 mmol/L 29.7* 30.0*   BUN mg/dL 14 20   CREATININE mg/dL 0.83 0.96   CALCIUM mg/dL 9.0 9.5   BILIRUBIN mg/dL 0.9 0.9   ALK PHOS U/L 48 61   ALT (SGPT) U/L 64* 88*   AST (SGOT) U/L 34 183*   GLUCOSE mg/dL 102* 146*           Lab Results  Lab Value Date/Time   LIPASE 140 (H) 11/08/2017 0625   LIPASE >600 (H) 11/07/2017 0843   LIPASE >600 (H) 11/06/2017 2312       Radiology:  CT Abdomen Pelvis With Contrast   Preliminary Result   Moderately severe pancreatitis, no pseudocyst is seen. Common bile duct   is  distended to 1.1 cm, in the distal common bile there may be a 0.3 cm   stone. MRCP is recommended. Few gallstones measuring up to 0.3 cm                    XR Chest 2 View   Preliminary Result   No acute findings.                  Assessment/Plan   Patient Active Problem List   Diagnosis   • History of colon polyps   • Acute pancreatitis due to calculus of common bile duct   • Abdominal pain, generalized     Patient 67-year-old male with history of hypertension, hyperlipidemia and arthritis presenting with acute abdominal pain consistent with gallstone pancreatitis.  Gallstones noted in the gallbladder on CT with normal-appearing ducts on the CAT scan.  Patient noted with rapid improvement in his LFTs as well as his lipase.  We'll check amylase this morning and likely should go ahead with cholecystectomy when patient is stable and ready by surgical standards and perform intraoperative cholangiogram to see if there are any additional stones left behind.  If cholangiogram positive proceed with ERCP.    I discussed the patients findings and my recommendations with patient.    Isrrael Lopez MD

## 2017-11-08 NOTE — PLAN OF CARE
Problem: Patient Care Overview (Adult)  Goal: Plan of Care Review  Outcome: Ongoing (interventions implemented as appropriate)    11/08/17 1736   Patient Care Overview   Progress improving   Outcome Evaluation   Outcome Summary/Follow up Plan pt complaining of abd pain and nausea. dilaudid given as ordered for pain and zofran given for nausea as ordered. vital signs within normal limits. pt up with assistance. pt ambulated in hallway. fluids continued as ordered. pt currently resting in bed.    Coping/Psychosocial Response Interventions   Plan Of Care Reviewed With patient       Goal: Adult Individualization and Mutuality  Outcome: Ongoing (interventions implemented as appropriate)  Goal: Discharge Needs Assessment  Outcome: Ongoing (interventions implemented as appropriate)    Problem: Pain, Acute (Adult)  Goal: Identify Related Risk Factors and Signs and Symptoms  Outcome: Outcome(s) achieved Date Met:  11/08/17  Goal: Acceptable Pain Control/Comfort Level  Outcome: Ongoing (interventions implemented as appropriate)    Problem: Fall Risk (Adult)  Goal: Identify Related Risk Factors and Signs and Symptoms  Outcome: Unable to achieve outcome(s) by discharge Date Met:  11/08/17  Goal: Absence of Falls  Outcome: Ongoing (interventions implemented as appropriate)

## 2017-11-08 NOTE — PROGRESS NOTES
Progress Note  Ariel Betancourt MD    Patient ID:  Name:  Nicanor Haley  MRN:  [id number]  1950  67 y.o.  male            CC/Reason for visit:   Acute pancreatitis due to calculus of common bile duct    Subjective: better,stone passed,still sore    Vitals:  Vitals:    11/08/17 0432 11/08/17 0814 11/08/17 0840 11/08/17 1100   BP: 129/70 146/72 146/72 121/69   BP Location: Right arm Right arm  Right arm   Patient Position: Lying Lying  Lying   Pulse: 75  74 71   Resp: 20 20  20   Temp: 99.3 °F (37.4 °C) 98.9 °F (37.2 °C)  99.1 °F (37.3 °C)   TempSrc: Oral Oral  Oral   SpO2: 90% 91%  100%   Weight:       Height:               Body mass index is 45.66 kg/(m^2).    Intake/Output Summary (Last 24 hours) at 11/08/17 1539  Last data filed at 11/08/17 1100   Gross per 24 hour   Intake             2300 ml   Output              700 ml   Net             1600 ml       Exam:  GEN:  No distress, appears stated age  EYES:   EOM-i, anicteric sclera bilat  ENT:    External ears/nose normal, OP clear  NECK:  Supple, midline trachea  LUNGS: Clear breath sounds bilat, nonlabored breathing  CV:  Normal S1S2, without murmur, no edema  ABD:  Mildly  tender, no enlarged liver or masses  EXT:  No cyanosis or clubbing    Scheduled meds:    atorvastatin 10 mg Oral Daily   docusate sodium 100 mg Oral BID   hydrochlorothiazide 25 mg Oral Daily   isosorbide mononitrate 30 mg Oral Daily   levothyroxine 125 mcg Oral QAM   metoprolol tartrate 25 mg Oral Q12H   probenecid 500 mg Oral BID   sertraline 100 mg Oral Daily   traZODone 150 mg Oral Nightly     IV meds:                        sodium chloride 100 mL/hr Last Rate: 100 mL/hr (11/08/17 0541)       Data Review:   I reviewed the patient's medications and new clinical results.  Lab Results   Component Value Date    CALCIUM 9.0 11/08/2017       Results from last 7 days  Lab Units 11/08/17  0625 11/06/17  2312   SODIUM mmol/L 140 144   POTASSIUM mmol/L 3.5 3.6   CHLORIDE mmol/L 100 100   CO2 mmol/L  29.7* 30.0*   BUN mg/dL 14 20   CREATININE mg/dL 0.83 0.96   CALCIUM mg/dL 9.0 9.5   BILIRUBIN mg/dL 0.9 0.9   ALK PHOS U/L 48 61   ALT (SGPT) U/L 64* 88*   AST (SGOT) U/L 34 183*   GLUCOSE mg/dL 102* 146*   WBC 10*3/mm3 8.54 6.63   HEMOGLOBIN g/dL 13.7 14.1   PLATELETS 10*3/mm3 113* 120*               Results from last 7 days  Lab Units 11/06/17  2312   TROPONIN T ng/mL <0.010           Estimated Creatinine Clearance: 120.2 mL/min (by C-G formula based on Cr of 0.83).    WEIGHTS:     Wt Readings from Last 1 Encounters:   11/07/17 0447 (!) 309 lb 3.2 oz (140 kg)   11/06/17 2240 (!) 304 lb (138 kg)         ASSESSMENT:   Principal Problem:    Acute pancreatitis due to calculus of common bile duct  Active Problems:    Abdominal pain, generalized    Stone passed naturaly    PLAN:  Dr Barillas note apreciated  Discussed w dr Carnes  Surgery in 1-2 days        Ariel Betancourt MD  11/8/2017

## 2017-11-09 LAB
ALBUMIN SERPL-MCNC: 2.7 G/DL (ref 3.5–5.2)
ALBUMIN/GLOB SERPL: 0.7 G/DL
ALP SERPL-CCNC: 49 U/L (ref 39–117)
ALT SERPL W P-5'-P-CCNC: 40 U/L (ref 1–41)
ANION GAP SERPL CALCULATED.3IONS-SCNC: 13.1 MMOL/L
AST SERPL-CCNC: 16 U/L (ref 1–40)
BILIRUB SERPL-MCNC: 1.1 MG/DL (ref 0.1–1.2)
BUN BLD-MCNC: 11 MG/DL (ref 8–23)
BUN/CREAT SERPL: 15.5 (ref 7–25)
CALCIUM SPEC-SCNC: 8.7 MG/DL (ref 8.6–10.5)
CHLORIDE SERPL-SCNC: 97 MMOL/L (ref 98–107)
CO2 SERPL-SCNC: 25.9 MMOL/L (ref 22–29)
CREAT BLD-MCNC: 0.71 MG/DL (ref 0.76–1.27)
GFR SERPL CREATININE-BSD FRML MDRD: 111 ML/MIN/1.73
GLOBULIN UR ELPH-MCNC: 3.9 GM/DL
GLUCOSE BLD-MCNC: 103 MG/DL (ref 65–99)
POTASSIUM BLD-SCNC: 3.2 MMOL/L (ref 3.5–5.2)
PROT SERPL-MCNC: 6.6 G/DL (ref 6–8.5)
SODIUM BLD-SCNC: 136 MMOL/L (ref 136–145)

## 2017-11-09 PROCEDURE — 25010000002 ONDANSETRON PER 1 MG: Performed by: SPECIALIST

## 2017-11-09 PROCEDURE — 80053 COMPREHEN METABOLIC PANEL: CPT | Performed by: SPECIALIST

## 2017-11-09 PROCEDURE — 25810000003 SODIUM CHLORIDE 0.9 % WITH KCL 20 MEQ 20-0.9 MEQ/L-% SOLUTION: Performed by: SURGERY

## 2017-11-09 PROCEDURE — 99024 POSTOP FOLLOW-UP VISIT: CPT | Performed by: SURGERY

## 2017-11-09 PROCEDURE — 25010000002 HYDROMORPHONE PER 4 MG: Performed by: SPECIALIST

## 2017-11-09 RX ORDER — SODIUM CHLORIDE AND POTASSIUM CHLORIDE 150; 900 MG/100ML; MG/100ML
100 INJECTION, SOLUTION INTRAVENOUS CONTINUOUS
Status: DISCONTINUED | OUTPATIENT
Start: 2017-11-09 | End: 2017-11-10

## 2017-11-09 RX ADMIN — POTASSIUM CHLORIDE AND SODIUM CHLORIDE 100 ML/HR: 900; 150 INJECTION, SOLUTION INTRAVENOUS at 12:27

## 2017-11-09 RX ADMIN — ATORVASTATIN CALCIUM 10 MG: 10 TABLET, FILM COATED ORAL at 08:38

## 2017-11-09 RX ADMIN — LEVOTHYROXINE SODIUM 125 MCG: 125 TABLET ORAL at 06:16

## 2017-11-09 RX ADMIN — HYDROMORPHONE HYDROCHLORIDE 0.5 MG: 1 INJECTION, SOLUTION INTRAMUSCULAR; INTRAVENOUS; SUBCUTANEOUS at 12:28

## 2017-11-09 RX ADMIN — DOCUSATE SODIUM 100 MG: 100 CAPSULE, LIQUID FILLED ORAL at 08:38

## 2017-11-09 RX ADMIN — PROBENECID 500 MG: 500 TABLET, FILM COATED ORAL at 08:38

## 2017-11-09 RX ADMIN — HYDROCHLOROTHIAZIDE 25 MG: 25 TABLET ORAL at 08:38

## 2017-11-09 RX ADMIN — ONDANSETRON 4 MG: 2 INJECTION INTRAMUSCULAR; INTRAVENOUS at 06:16

## 2017-11-09 RX ADMIN — PROBENECID 500 MG: 500 TABLET, FILM COATED ORAL at 17:47

## 2017-11-09 RX ADMIN — METOPROLOL TARTRATE 25 MG: 25 TABLET ORAL at 20:11

## 2017-11-09 RX ADMIN — ISOSORBIDE MONONITRATE 30 MG: 30 TABLET ORAL at 08:38

## 2017-11-09 RX ADMIN — ONDANSETRON 4 MG: 2 INJECTION INTRAMUSCULAR; INTRAVENOUS at 18:28

## 2017-11-09 RX ADMIN — HYDROMORPHONE HYDROCHLORIDE 0.5 MG: 1 INJECTION, SOLUTION INTRAMUSCULAR; INTRAVENOUS; SUBCUTANEOUS at 18:28

## 2017-11-09 RX ADMIN — DOCUSATE SODIUM 100 MG: 100 CAPSULE, LIQUID FILLED ORAL at 17:47

## 2017-11-09 RX ADMIN — POTASSIUM CHLORIDE AND SODIUM CHLORIDE 100 ML/HR: 900; 150 INJECTION, SOLUTION INTRAVENOUS at 23:03

## 2017-11-09 RX ADMIN — HYDROMORPHONE HYDROCHLORIDE 0.5 MG: 1 INJECTION, SOLUTION INTRAMUSCULAR; INTRAVENOUS; SUBCUTANEOUS at 06:16

## 2017-11-09 RX ADMIN — METOPROLOL TARTRATE 25 MG: 25 TABLET ORAL at 08:38

## 2017-11-09 RX ADMIN — ONDANSETRON 4 MG: 2 INJECTION INTRAMUSCULAR; INTRAVENOUS at 12:28

## 2017-11-09 RX ADMIN — TRAZODONE HYDROCHLORIDE 150 MG: 50 TABLET ORAL at 20:11

## 2017-11-09 RX ADMIN — SERTRALINE 100 MG: 100 TABLET, FILM COATED ORAL at 08:38

## 2017-11-09 NOTE — PROGRESS NOTES
Progress Note  Ariel Betancourt MD    Patient ID:  Name:  Nicanor Haley  MRN:  [id number]  1950  67 y.o.  male            CC/Reason for visit:gallstone pancreatitis     Subjective: Overall improving in terms of abdominal pain, tolerating clear liquids without nausea or emesis    Vitals:  Vitals:    11/08/17 1903 11/08/17 2313 11/09/17 0732 11/09/17 1112   BP: 120/68 159/70 145/74 134/71   BP Location: Right arm Right arm Right arm Right arm   Patient Position: Lying Lying Lying Lying   Pulse: 65 67 65    Resp: 20 20 20 20   Temp: 98.6 °F (37 °C) 98.6 °F (37 °C) 98.5 °F (36.9 °C) 98.9 °F (37.2 °C)   TempSrc: Oral Oral Oral Oral   SpO2: 95% 98% 95%    Weight:       Height:               Body mass index is 45.66 kg/(m^2).    Intake/Output Summary (Last 24 hours) at 11/09/17 1239  Last data filed at 11/09/17 1228   Gross per 24 hour   Intake          1753.33 ml   Output              550 ml   Net          1203.33 ml       Exam:  GEN:  No distress, appears stated age  EYES:   EOM-i, anicteric sclera bilat  ENT:    External ears/nose normal, OP clear  NECK:  Supple, midline trachea  LUNGS: Clear breath sounds bilat, nonlabored breathing  CV:  Normal S1S2, without murmur, no edema  ABD:             moderately tender, no enlarged liver or masses  EXT:  No cyanosis or clubbing    Scheduled meds:    atorvastatin 10 mg Oral Daily   docusate sodium 100 mg Oral BID   hydrochlorothiazide 25 mg Oral Daily   isosorbide mononitrate 30 mg Oral Daily   levothyroxine 125 mcg Oral QAM   metoprolol tartrate 25 mg Oral Q12H   probenecid 500 mg Oral BID   sertraline 100 mg Oral Daily   traZODone 150 mg Oral Nightly     IV meds:                        sodium chloride 0.9 % with KCl 20 mEq 100 mL/hr Last Rate: 100 mL/hr (11/09/17 1227)       Data Review:   I reviewed the patient's medications and new clinical results.  Lab Results   Component Value Date    CALCIUM 8.7 11/09/2017       Results from last 7 days  Lab Units 11/09/17  0504  11/08/17  0625 11/06/17  2312   SODIUM mmol/L 136 140 144   POTASSIUM mmol/L 3.2* 3.5 3.6   CHLORIDE mmol/L 97* 100 100   CO2 mmol/L 25.9 29.7* 30.0*   BUN mg/dL 11 14 20   CREATININE mg/dL 0.71* 0.83 0.96   CALCIUM mg/dL 8.7 9.0 9.5   BILIRUBIN mg/dL 1.1 0.9 0.9   ALK PHOS U/L 49 48 61   ALT (SGPT) U/L 40 64* 88*   AST (SGOT) U/L 16 34 183*   GLUCOSE mg/dL 103* 102* 146*   WBC 10*3/mm3  --  8.54 6.63   HEMOGLOBIN g/dL  --  13.7 14.1   PLATELETS 10*3/mm3  --  113* 120*               Results from last 7 days  Lab Units 11/06/17  2312   TROPONIN T ng/mL <0.010           Estimated Creatinine Clearance: 124.7 mL/min (by C-G formula based on Cr of 0.71).    WEIGHTS:     Wt Readings from Last 1 Encounters:   11/07/17 0447 (!) 309 lb 3.2 oz (140 kg)   11/06/17 2240 (!) 304 lb (138 kg)         ASSESSMENT:   Principal Problem:    Acute pancreatitis due to calculus of common bile duct  Active Problems:    Abdominal pain, generalized      PLAN:  OR tomorrow  D/W the patient  Improving        Ariel Betancourt MD  11/9/2017

## 2017-11-09 NOTE — PLAN OF CARE
Problem: Patient Care Overview (Adult)  Goal: Plan of Care Review  Outcome: Ongoing (interventions implemented as appropriate)    11/09/17 0340   Patient Care Overview   Progress improving   Outcome Evaluation   Outcome Summary/Follow up Plan Possible OR on Friday. Still with abd pain at times, but states it is getting better.   Coping/Psychosocial Response Interventions   Plan Of Care Reviewed With patient       Goal: Adult Individualization and Mutuality  Outcome: Ongoing (interventions implemented as appropriate)  Goal: Discharge Needs Assessment  Outcome: Ongoing (interventions implemented as appropriate)    Problem: Pain, Acute (Adult)  Goal: Acceptable Pain Control/Comfort Level  Outcome: Ongoing (interventions implemented as appropriate)    Problem: Fall Risk (Adult)  Goal: Absence of Falls  Outcome: Ongoing (interventions implemented as appropriate)

## 2017-11-09 NOTE — PROGRESS NOTES
CC:  Follow-up gallstone pancreatitis    HPI:  Overall improving in terms of abdominal pain, tolerating clear liquids without nausea or emesis    PE:    Awake, alert  VS: Afebrile vital signs stable  Abdomen:  in upper abdomen but definitely improved and no peritoneal signs    LABS:  Liver enzymes have returned to normal  Lipase yesterday was 140    IMPRESSION & PLAN:  Resolving gallstone pancreatitis.  Proceed with laparoscopic cholecystectomy tomorrow.  He understands the rationale for the procedure, the nature of the procedure, and the risks including but not limited to bleeding, infection, and conversion to open procedure.  Understands the risk of converting to open procedure slightly increased secondary to prior surgical history and his BMI of 45.6.

## 2017-11-09 NOTE — PLAN OF CARE
Problem: Patient Care Overview (Adult)  Goal: Plan of Care Review  Outcome: Ongoing (interventions implemented as appropriate)    11/09/17 7765   Patient Care Overview   Progress progress toward functional goals as expected   Outcome Evaluation   Outcome Summary/Follow up Plan medicated for pain and n/v with mild/moderate relief. no c/o soa. no s/s of acute distress. pt will be npo at midnight for sx in am   Coping/Psychosocial Response Interventions   Plan Of Care Reviewed With patient       Goal: Adult Individualization and Mutuality  Outcome: Ongoing (interventions implemented as appropriate)  Goal: Discharge Needs Assessment  Outcome: Ongoing (interventions implemented as appropriate)    Problem: Pain, Acute (Adult)  Goal: Acceptable Pain Control/Comfort Level  Outcome: Ongoing (interventions implemented as appropriate)    Problem: Fall Risk (Adult)  Goal: Absence of Falls  Outcome: Ongoing (interventions implemented as appropriate)

## 2017-11-10 ENCOUNTER — ANESTHESIA EVENT (OUTPATIENT)
Dept: PERIOP | Facility: HOSPITAL | Age: 67
End: 2017-11-10

## 2017-11-10 ENCOUNTER — APPOINTMENT (OUTPATIENT)
Dept: GENERAL RADIOLOGY | Facility: HOSPITAL | Age: 67
End: 2017-11-10

## 2017-11-10 ENCOUNTER — ANESTHESIA (OUTPATIENT)
Dept: PERIOP | Facility: HOSPITAL | Age: 67
End: 2017-11-10

## 2017-11-10 PROCEDURE — BF101ZZ FLUOROSCOPY OF BILE DUCTS USING LOW OSMOLAR CONTRAST: ICD-10-PCS | Performed by: SURGERY

## 2017-11-10 PROCEDURE — 0 IOTHALAMATE 60 % SOLUTION: Performed by: SURGERY

## 2017-11-10 PROCEDURE — 25010000002 FENTANYL CITRATE (PF) 100 MCG/2ML SOLUTION: Performed by: ANESTHESIOLOGY

## 2017-11-10 PROCEDURE — 25010000002 NEOSTIGMINE PER 0.5 MG: Performed by: ANESTHESIOLOGY

## 2017-11-10 PROCEDURE — 25010000002 MIDAZOLAM PER 1 MG: Performed by: ANESTHESIOLOGY

## 2017-11-10 PROCEDURE — 74300 X-RAY BILE DUCTS/PANCREAS: CPT

## 2017-11-10 PROCEDURE — 94660 CPAP INITIATION&MGMT: CPT

## 2017-11-10 PROCEDURE — 25810000003 SODIUM CHLORIDE 0.9 % WITH KCL 20 MEQ 20-0.9 MEQ/L-% SOLUTION: Performed by: SURGERY

## 2017-11-10 PROCEDURE — 47563 LAPARO CHOLECYSTECTOMY/GRAPH: CPT | Performed by: SURGERY

## 2017-11-10 PROCEDURE — 25010000002 ONDANSETRON PER 1 MG: Performed by: ANESTHESIOLOGY

## 2017-11-10 PROCEDURE — 0FT44ZZ RESECTION OF GALLBLADDER, PERCUTANEOUS ENDOSCOPIC APPROACH: ICD-10-PCS | Performed by: SURGERY

## 2017-11-10 PROCEDURE — 88304 TISSUE EXAM BY PATHOLOGIST: CPT | Performed by: SURGERY

## 2017-11-10 PROCEDURE — 25010000002 ONDANSETRON PER 1 MG: Performed by: SPECIALIST

## 2017-11-10 PROCEDURE — 47563 LAPARO CHOLECYSTECTOMY/GRAPH: CPT | Performed by: PHYSICIAN ASSISTANT

## 2017-11-10 PROCEDURE — 25010000002 PROPOFOL 10 MG/ML EMULSION: Performed by: ANESTHESIOLOGY

## 2017-11-10 PROCEDURE — 25010000002 HYDROMORPHONE PER 4 MG: Performed by: SPECIALIST

## 2017-11-10 PROCEDURE — 94799 UNLISTED PULMONARY SVC/PX: CPT

## 2017-11-10 PROCEDURE — 25010000002 HYDROMORPHONE PER 4 MG: Performed by: ANESTHESIOLOGY

## 2017-11-10 PROCEDURE — 25010000002 SUCCINYLCHOLINE PER 20 MG: Performed by: ANESTHESIOLOGY

## 2017-11-10 RX ORDER — HYDROMORPHONE HYDROCHLORIDE 1 MG/ML
0.5 INJECTION, SOLUTION INTRAMUSCULAR; INTRAVENOUS; SUBCUTANEOUS
Status: DISCONTINUED | OUTPATIENT
Start: 2017-11-10 | End: 2017-11-10 | Stop reason: HOSPADM

## 2017-11-10 RX ORDER — LIDOCAINE HYDROCHLORIDE 20 MG/ML
INJECTION, SOLUTION INFILTRATION; PERINEURAL AS NEEDED
Status: DISCONTINUED | OUTPATIENT
Start: 2017-11-10 | End: 2017-11-10 | Stop reason: SURG

## 2017-11-10 RX ORDER — PROMETHAZINE HYDROCHLORIDE 25 MG/1
25 SUPPOSITORY RECTAL ONCE AS NEEDED
Status: DISCONTINUED | OUTPATIENT
Start: 2017-11-10 | End: 2017-11-10 | Stop reason: HOSPADM

## 2017-11-10 RX ORDER — HYDRALAZINE HYDROCHLORIDE 20 MG/ML
5 INJECTION INTRAMUSCULAR; INTRAVENOUS
Status: DISCONTINUED | OUTPATIENT
Start: 2017-11-10 | End: 2017-11-10 | Stop reason: HOSPADM

## 2017-11-10 RX ORDER — HYDROCODONE BITARTRATE AND ACETAMINOPHEN 5; 325 MG/1; MG/1
2 TABLET ORAL EVERY 4 HOURS PRN
Status: DISCONTINUED | OUTPATIENT
Start: 2017-11-10 | End: 2017-11-12 | Stop reason: HOSPADM

## 2017-11-10 RX ORDER — DIPHENHYDRAMINE HYDROCHLORIDE 50 MG/ML
12.5 INJECTION INTRAMUSCULAR; INTRAVENOUS
Status: DISCONTINUED | OUTPATIENT
Start: 2017-11-10 | End: 2017-11-10 | Stop reason: HOSPADM

## 2017-11-10 RX ORDER — PROMETHAZINE HYDROCHLORIDE 25 MG/1
12.5 TABLET ORAL ONCE AS NEEDED
Status: DISCONTINUED | OUTPATIENT
Start: 2017-11-10 | End: 2017-11-10 | Stop reason: HOSPADM

## 2017-11-10 RX ORDER — SODIUM CHLORIDE AND POTASSIUM CHLORIDE 150; 900 MG/100ML; MG/100ML
75 INJECTION, SOLUTION INTRAVENOUS CONTINUOUS
Status: DISCONTINUED | OUTPATIENT
Start: 2017-11-10 | End: 2017-11-11

## 2017-11-10 RX ORDER — MAGNESIUM HYDROXIDE 1200 MG/15ML
LIQUID ORAL AS NEEDED
Status: DISCONTINUED | OUTPATIENT
Start: 2017-11-10 | End: 2017-11-10 | Stop reason: HOSPADM

## 2017-11-10 RX ORDER — MIDAZOLAM HYDROCHLORIDE 1 MG/ML
1 INJECTION INTRAMUSCULAR; INTRAVENOUS
Status: DISCONTINUED | OUTPATIENT
Start: 2017-11-10 | End: 2017-11-10 | Stop reason: HOSPADM

## 2017-11-10 RX ORDER — PROMETHAZINE HYDROCHLORIDE 25 MG/ML
12.5 INJECTION, SOLUTION INTRAMUSCULAR; INTRAVENOUS ONCE AS NEEDED
Status: DISCONTINUED | OUTPATIENT
Start: 2017-11-10 | End: 2017-11-10 | Stop reason: HOSPADM

## 2017-11-10 RX ORDER — BUPIVACAINE HYDROCHLORIDE AND EPINEPHRINE 5; 5 MG/ML; UG/ML
INJECTION, SOLUTION PERINEURAL AS NEEDED
Status: DISCONTINUED | OUTPATIENT
Start: 2017-11-10 | End: 2017-11-10 | Stop reason: HOSPADM

## 2017-11-10 RX ORDER — GLYCOPYRROLATE 0.2 MG/ML
INJECTION INTRAMUSCULAR; INTRAVENOUS AS NEEDED
Status: DISCONTINUED | OUTPATIENT
Start: 2017-11-10 | End: 2017-11-10 | Stop reason: SURG

## 2017-11-10 RX ORDER — FENTANYL CITRATE 50 UG/ML
50 INJECTION, SOLUTION INTRAMUSCULAR; INTRAVENOUS
Status: DISCONTINUED | OUTPATIENT
Start: 2017-11-10 | End: 2017-11-10 | Stop reason: HOSPADM

## 2017-11-10 RX ORDER — PROMETHAZINE HYDROCHLORIDE 25 MG/1
25 TABLET ORAL ONCE AS NEEDED
Status: DISCONTINUED | OUTPATIENT
Start: 2017-11-10 | End: 2017-11-10 | Stop reason: HOSPADM

## 2017-11-10 RX ORDER — ONDANSETRON 2 MG/ML
4 INJECTION INTRAMUSCULAR; INTRAVENOUS ONCE AS NEEDED
Status: DISCONTINUED | OUTPATIENT
Start: 2017-11-10 | End: 2017-11-10 | Stop reason: HOSPADM

## 2017-11-10 RX ORDER — ONDANSETRON 2 MG/ML
INJECTION INTRAMUSCULAR; INTRAVENOUS AS NEEDED
Status: DISCONTINUED | OUTPATIENT
Start: 2017-11-10 | End: 2017-11-10 | Stop reason: SURG

## 2017-11-10 RX ORDER — HYDROCODONE BITARTRATE AND ACETAMINOPHEN 7.5; 325 MG/1; MG/1
1 TABLET ORAL ONCE AS NEEDED
Status: DISCONTINUED | OUTPATIENT
Start: 2017-11-10 | End: 2017-11-10 | Stop reason: HOSPADM

## 2017-11-10 RX ORDER — SODIUM CHLORIDE 0.9 % (FLUSH) 0.9 %
1-10 SYRINGE (ML) INJECTION AS NEEDED
Status: DISCONTINUED | OUTPATIENT
Start: 2017-11-10 | End: 2017-11-10 | Stop reason: HOSPADM

## 2017-11-10 RX ORDER — SUCCINYLCHOLINE CHLORIDE 20 MG/ML
INJECTION INTRAMUSCULAR; INTRAVENOUS AS NEEDED
Status: DISCONTINUED | OUTPATIENT
Start: 2017-11-10 | End: 2017-11-10 | Stop reason: SURG

## 2017-11-10 RX ORDER — SODIUM CHLORIDE, SODIUM LACTATE, POTASSIUM CHLORIDE, CALCIUM CHLORIDE 600; 310; 30; 20 MG/100ML; MG/100ML; MG/100ML; MG/100ML
9 INJECTION, SOLUTION INTRAVENOUS CONTINUOUS
Status: DISCONTINUED | OUTPATIENT
Start: 2017-11-10 | End: 2017-11-10

## 2017-11-10 RX ORDER — OXYCODONE AND ACETAMINOPHEN 7.5; 325 MG/1; MG/1
1 TABLET ORAL ONCE AS NEEDED
Status: DISCONTINUED | OUTPATIENT
Start: 2017-11-10 | End: 2017-11-10 | Stop reason: HOSPADM

## 2017-11-10 RX ORDER — PROPOFOL 10 MG/ML
VIAL (ML) INTRAVENOUS AS NEEDED
Status: DISCONTINUED | OUTPATIENT
Start: 2017-11-10 | End: 2017-11-10 | Stop reason: SURG

## 2017-11-10 RX ORDER — NALOXONE HCL 0.4 MG/ML
0.2 VIAL (ML) INJECTION AS NEEDED
Status: DISCONTINUED | OUTPATIENT
Start: 2017-11-10 | End: 2017-11-10 | Stop reason: HOSPADM

## 2017-11-10 RX ORDER — FAMOTIDINE 10 MG/ML
20 INJECTION, SOLUTION INTRAVENOUS ONCE
Status: COMPLETED | OUTPATIENT
Start: 2017-11-10 | End: 2017-11-10

## 2017-11-10 RX ORDER — LABETALOL HYDROCHLORIDE 5 MG/ML
5 INJECTION, SOLUTION INTRAVENOUS
Status: DISCONTINUED | OUTPATIENT
Start: 2017-11-10 | End: 2017-11-10 | Stop reason: HOSPADM

## 2017-11-10 RX ORDER — FENTANYL CITRATE 50 UG/ML
INJECTION, SOLUTION INTRAMUSCULAR; INTRAVENOUS AS NEEDED
Status: DISCONTINUED | OUTPATIENT
Start: 2017-11-10 | End: 2017-11-10 | Stop reason: SURG

## 2017-11-10 RX ORDER — ROCURONIUM BROMIDE 10 MG/ML
INJECTION, SOLUTION INTRAVENOUS AS NEEDED
Status: DISCONTINUED | OUTPATIENT
Start: 2017-11-10 | End: 2017-11-10 | Stop reason: SURG

## 2017-11-10 RX ORDER — MIDAZOLAM HYDROCHLORIDE 1 MG/ML
2 INJECTION INTRAMUSCULAR; INTRAVENOUS
Status: DISCONTINUED | OUTPATIENT
Start: 2017-11-10 | End: 2017-11-10 | Stop reason: HOSPADM

## 2017-11-10 RX ORDER — FLUMAZENIL 0.1 MG/ML
0.2 INJECTION INTRAVENOUS AS NEEDED
Status: DISCONTINUED | OUTPATIENT
Start: 2017-11-10 | End: 2017-11-10 | Stop reason: HOSPADM

## 2017-11-10 RX ORDER — EPHEDRINE SULFATE 50 MG/ML
5 INJECTION, SOLUTION INTRAVENOUS ONCE AS NEEDED
Status: DISCONTINUED | OUTPATIENT
Start: 2017-11-10 | End: 2017-11-10 | Stop reason: HOSPADM

## 2017-11-10 RX ADMIN — MIDAZOLAM 1 MG: 1 INJECTION INTRAMUSCULAR; INTRAVENOUS at 13:48

## 2017-11-10 RX ADMIN — HYDROMORPHONE HYDROCHLORIDE 0.5 MG: 1 INJECTION, SOLUTION INTRAMUSCULAR; INTRAVENOUS; SUBCUTANEOUS at 17:01

## 2017-11-10 RX ADMIN — FAMOTIDINE 20 MG: 10 INJECTION INTRAVENOUS at 13:47

## 2017-11-10 RX ADMIN — HYDROMORPHONE HYDROCHLORIDE 0.5 MG: 1 INJECTION, SOLUTION INTRAMUSCULAR; INTRAVENOUS; SUBCUTANEOUS at 20:17

## 2017-11-10 RX ADMIN — ROCURONIUM BROMIDE 5 MG: 10 INJECTION INTRAVENOUS at 15:35

## 2017-11-10 RX ADMIN — HYDROMORPHONE HYDROCHLORIDE 0.5 MG: 1 INJECTION, SOLUTION INTRAMUSCULAR; INTRAVENOUS; SUBCUTANEOUS at 00:26

## 2017-11-10 RX ADMIN — FENTANYL CITRATE 50 MCG: 50 INJECTION, SOLUTION INTRAMUSCULAR; INTRAVENOUS at 16:06

## 2017-11-10 RX ADMIN — ONDANSETRON 4 MG: 2 INJECTION INTRAMUSCULAR; INTRAVENOUS at 20:17

## 2017-11-10 RX ADMIN — LIDOCAINE HYDROCHLORIDE 60 MG: 20 INJECTION, SOLUTION INFILTRATION; PERINEURAL at 15:35

## 2017-11-10 RX ADMIN — POTASSIUM CHLORIDE AND SODIUM CHLORIDE 100 ML/HR: 900; 150 INJECTION, SOLUTION INTRAVENOUS at 08:05

## 2017-11-10 RX ADMIN — HYDROMORPHONE HYDROCHLORIDE 0.5 MG: 1 INJECTION, SOLUTION INTRAMUSCULAR; INTRAVENOUS; SUBCUTANEOUS at 07:54

## 2017-11-10 RX ADMIN — ONDANSETRON 4 MG: 2 INJECTION INTRAMUSCULAR; INTRAVENOUS at 07:54

## 2017-11-10 RX ADMIN — SUCCINYLCHOLINE CHLORIDE 160 MG: 20 INJECTION, SOLUTION INTRAMUSCULAR; INTRAVENOUS; PARENTERAL at 15:35

## 2017-11-10 RX ADMIN — SUGAMMADEX 200 MG: 100 INJECTION, SOLUTION INTRAVENOUS at 16:30

## 2017-11-10 RX ADMIN — GLYCOPYRROLATE 0.4 MG: 0.2 INJECTION INTRAMUSCULAR; INTRAVENOUS at 16:26

## 2017-11-10 RX ADMIN — METOPROLOL TARTRATE 25 MG: 25 TABLET ORAL at 20:12

## 2017-11-10 RX ADMIN — PROPOFOL 200 MG: 10 INJECTION, EMULSION INTRAVENOUS at 15:35

## 2017-11-10 RX ADMIN — METOPROLOL TARTRATE 25 MG: 25 TABLET ORAL at 08:04

## 2017-11-10 RX ADMIN — FENTANYL CITRATE 50 MCG: 50 INJECTION, SOLUTION INTRAMUSCULAR; INTRAVENOUS at 15:35

## 2017-11-10 RX ADMIN — ONDANSETRON 4 MG: 2 INJECTION INTRAMUSCULAR; INTRAVENOUS at 00:25

## 2017-11-10 RX ADMIN — ROCURONIUM BROMIDE 25 MG: 10 INJECTION INTRAVENOUS at 15:39

## 2017-11-10 RX ADMIN — POTASSIUM CHLORIDE AND SODIUM CHLORIDE 75 ML/HR: 900; 150 INJECTION, SOLUTION INTRAVENOUS at 18:15

## 2017-11-10 RX ADMIN — TRAZODONE HYDROCHLORIDE 150 MG: 50 TABLET ORAL at 20:13

## 2017-11-10 RX ADMIN — SODIUM CHLORIDE, POTASSIUM CHLORIDE, SODIUM LACTATE AND CALCIUM CHLORIDE 9 ML/HR: 600; 310; 30; 20 INJECTION, SOLUTION INTRAVENOUS at 13:30

## 2017-11-10 RX ADMIN — SODIUM CHLORIDE, POTASSIUM CHLORIDE, SODIUM LACTATE AND CALCIUM CHLORIDE: 600; 310; 30; 20 INJECTION, SOLUTION INTRAVENOUS at 16:17

## 2017-11-10 RX ADMIN — ROCURONIUM BROMIDE 5 MG: 10 INJECTION INTRAVENOUS at 16:06

## 2017-11-10 RX ADMIN — NEOSTIGMINE METHYLSULFATE 3 MG: 1 INJECTION INTRAMUSCULAR; INTRAVENOUS; SUBCUTANEOUS at 16:26

## 2017-11-10 RX ADMIN — ONDANSETRON 4 MG: 2 INJECTION INTRAMUSCULAR; INTRAVENOUS at 16:04

## 2017-11-10 NOTE — PLAN OF CARE
Problem: Patient Care Overview (Adult)  Goal: Plan of Care Review  Outcome: Ongoing (interventions implemented as appropriate)    11/10/17 0318   Patient Care Overview   Progress no change   Outcome Evaluation   Outcome Summary/Follow up Plan pt is A&Ox4, Npo since midnight, scheduled for Lap/Choley in am, pain meds and nausea meds given with moderate relief, up with assist x1 and walker,   Coping/Psychosocial Response Interventions   Plan Of Care Reviewed       11/10/17 0318   Patient Care Overview   Progress no change   Outcome Evaluation   Outcome Summary/Follow up Plan pt is A&Ox4, Npo since midnight, scheduled for Lap/Choley in am, pain meds and nausea meds given with moderate relief, up with assist x1 and walker,   Coping/Psychosocial Response Interventions   Plan Of Care Reviewed With patient    With patient       Goal: Adult Individualization and Mutuality  Outcome: Ongoing (interventions implemented as appropriate)    Problem: Pain, Acute (Adult)  Goal: Acceptable Pain Control/Comfort Level  Outcome: Ongoing (interventions implemented as appropriate)    Problem: Fall Risk (Adult)  Goal: Absence of Falls  Outcome: Ongoing (interventions implemented as appropriate)

## 2017-11-10 NOTE — PERIOPERATIVE NURSING NOTE
"Patient states he has a CPAP machine but \"it is blowing too much air and is not working right\".  Anesthesia informed  "

## 2017-11-10 NOTE — ANESTHESIA POSTPROCEDURE EVALUATION
Patient: Nicanor Haley    Procedure Summary     Date Anesthesia Start Anesthesia Stop Room / Location    11/10/17 3035 [id number]  [facility] MAIN OR       Procedure Diagnosis Surgeon Provider    CHOLECYSTECTOMY LAPAROSCOPIC INTRAOPERATIVE CHOLANGIOGRAM (N/A Abdomen) Acute pancreatitis due to calculus of common bile duct  (Acute pancreatitis due to calculus of common bile duct [K85.90, K80.50]) MD Reese Hanna MD          Anesthesia Type: general  Last vitals  BP   136/70 (11/10/17 1703)   Temp   36.3 °C (97.4 °F) (11/10/17 1703)   Pulse   57 (11/10/17 1703)   Resp   18 (11/10/17 1703)     SpO2   96 % (11/10/17 1703)     Post Anesthesia Care and Evaluation    Patient location during evaluation: PACU  Patient participation: complete - patient participated  Level of consciousness: awake and alert  Pain management: adequate  Airway patency: patent  Anesthetic complications: No anesthetic complications    Cardiovascular status: acceptable  Respiratory status: acceptable  Hydration status: acceptable    Comments: ---------------------------               11/10/17                      1703         ---------------------------   BP:          136/70         Pulse:         57           Resp:          18           Temp:   36.3 °C (97.4 °F)   SpO2:          96%         ---------------------------

## 2017-11-10 NOTE — ANESTHESIA PREPROCEDURE EVALUATION
Anesthesia Evaluation     no history of anesthetic complications:         Airway   Mallampati: II  TM distance: >3 FB  Neck ROM: full  Dental - normal exam     Comment: Missing top front teeth on L    Pulmonary    (-) asthma, shortness of breath, recent URISleep apnea: CPAP broken.  Cardiovascular     (+) hypertension, hyperlipidemia  (-) dysrhythmias, angina, REID    ROS comment: 1st degree AV block    Neuro/Psych  (-) TIA, CVA, syncope  GI/Hepatic/Renal/Endo    (+)  hypothyroidism,   (-) no renal disease, diabetes    ROS Comment: Pancreatitis resolved since stone removed from duct    Musculoskeletal     Abdominal    Substance History      OB/GYN          Other                                        Anesthesia Plan    ASA 3     intravenous induction   Anesthetic plan and risks discussed with patient.

## 2017-11-10 NOTE — OP NOTE
PREOPERATIVE DIAGNOSIS:  Gallstone pancreatitis    POSTOPERATIVE DIAGNOSIS (FINDINGS):  Same    PROCEDURE:  Laparoscopic cholecystectomy with cholangiogram    SURGEON:  Elian Carnes MD    ASSISTANT:  Vineet Dennis    ANESTHESIA:  General    EBL:  Minimal    SPECIMEN(S):  Gallbladder    DESCRIPTION:  Supine position. General anesthesia. Prepped and draped, usual sterile manner.    1/2 % marcaine with epinephrine infiltrated in all incision sites. Small periumbilical incision made, Veress needle inserted with upward traction on abdominal wall. Abdomen insufflated to 15 mm Hg pressure and 5 mm opti-view trocar inserted followed by 10 mm subxiphoid and 5 mm right subcostal trocars.    Gallbladder grasped and elevated. Cystic duct dissected and clipped once distally. Cholangiogram catheter inserted and cholangiogram demonstrated prompt filling of duodenum, no filling defects. Cystic duct clipped twice proximally and divided. Cystic artery clipped twice proximally, once distally and divided. Gallbladder removed from the liver bed with electrocautery and removed through subxiphoid trocar site. Liver bed copiously irrigated and aspirated with good hemostasis noted.  19 Somali Damián drain placed in the gallbladder fossa.  CO2 released, fascia of subxiphoid trocar site closed with 0-vicryl, skin edges 5-0 vicryl subcuticular suture.    Tolerated well.  Stable to PACU.    Elian Carnes M.D.

## 2017-11-10 NOTE — PLAN OF CARE
Problem: Patient Care Overview (Adult)  Goal: Plan of Care Review  Outcome: Ongoing (interventions implemented as appropriate)    11/10/17 8858   Patient Care Overview   Progress progress toward functional goals as expected   Outcome Evaluation   Outcome Summary/Follow up Plan pt medicated for pain and nausea with moderate relief. pt has 4 lap sites with 1 oneal drain with small amount of serosanguineousdrainage. hypo active bowel sounds. no acute s/.s of distress   Coping/Psychosocial Response Interventions   Plan Of Care Reviewed With patient       Goal: Adult Individualization and Mutuality  Outcome: Ongoing (interventions implemented as appropriate)  Goal: Discharge Needs Assessment  Outcome: Ongoing (interventions implemented as appropriate)    Problem: Pain, Acute (Adult)  Goal: Acceptable Pain Control/Comfort Level  Outcome: Ongoing (interventions implemented as appropriate)    Problem: Fall Risk (Adult)  Goal: Absence of Falls  Outcome: Ongoing (interventions implemented as appropriate)

## 2017-11-10 NOTE — ANESTHESIA PROCEDURE NOTES
Airway  Urgency: elective      General Information and Staff    Patient location during procedure: OR  Anesthesiologist: ELLIS HAWK    Indications and Patient Condition    Preoxygenated: yes      Final Airway Details  Final airway type: endotracheal airway      Successful airway: ETT  Cuffed: yes   Successful intubation technique: direct laryngoscopy  Endotracheal tube insertion site: oral  Blade: Олег  Blade size: #4  ETT size: 8.0 mm  Cormack-Lehane Classification: grade I - full view of glottis  Placement verified by: chest auscultation   Number of attempts at approach: 1

## 2017-11-11 PROCEDURE — 25810000003 SODIUM CHLORIDE 0.9 % WITH KCL 20 MEQ 20-0.9 MEQ/L-% SOLUTION: Performed by: SURGERY

## 2017-11-11 PROCEDURE — 25010000002 ONDANSETRON PER 1 MG: Performed by: SPECIALIST

## 2017-11-11 PROCEDURE — 25010000002 HYDROMORPHONE PER 4 MG: Performed by: SPECIALIST

## 2017-11-11 RX ORDER — HYDROMORPHONE HYDROCHLORIDE 2 MG/1
4 TABLET ORAL EVERY 6 HOURS PRN
Status: DISCONTINUED | OUTPATIENT
Start: 2017-11-11 | End: 2017-11-12 | Stop reason: HOSPADM

## 2017-11-11 RX ADMIN — PROBENECID 500 MG: 500 TABLET, FILM COATED ORAL at 17:00

## 2017-11-11 RX ADMIN — POTASSIUM CHLORIDE AND SODIUM CHLORIDE 75 ML/HR: 900; 150 INJECTION, SOLUTION INTRAVENOUS at 00:02

## 2017-11-11 RX ADMIN — DOCUSATE SODIUM 100 MG: 100 CAPSULE, LIQUID FILLED ORAL at 08:43

## 2017-11-11 RX ADMIN — ATORVASTATIN CALCIUM 10 MG: 10 TABLET, FILM COATED ORAL at 08:44

## 2017-11-11 RX ADMIN — HYDROMORPHONE HYDROCHLORIDE 0.5 MG: 1 INJECTION, SOLUTION INTRAMUSCULAR; INTRAVENOUS; SUBCUTANEOUS at 05:11

## 2017-11-11 RX ADMIN — ONDANSETRON 4 MG: 2 INJECTION INTRAMUSCULAR; INTRAVENOUS at 05:09

## 2017-11-11 RX ADMIN — ISOSORBIDE MONONITRATE 30 MG: 30 TABLET ORAL at 08:44

## 2017-11-11 RX ADMIN — LEVOTHYROXINE SODIUM 125 MCG: 125 TABLET ORAL at 05:12

## 2017-11-11 RX ADMIN — HYDROCHLOROTHIAZIDE 25 MG: 25 TABLET ORAL at 08:44

## 2017-11-11 RX ADMIN — HYDROMORPHONE HYDROCHLORIDE 4 MG: 2 TABLET ORAL at 16:53

## 2017-11-11 RX ADMIN — HYDROMORPHONE HYDROCHLORIDE 0.5 MG: 1 INJECTION, SOLUTION INTRAMUSCULAR; INTRAVENOUS; SUBCUTANEOUS at 11:27

## 2017-11-11 RX ADMIN — METOPROLOL TARTRATE 25 MG: 25 TABLET ORAL at 20:38

## 2017-11-11 RX ADMIN — PROBENECID 500 MG: 500 TABLET, FILM COATED ORAL at 08:44

## 2017-11-11 RX ADMIN — SERTRALINE 100 MG: 100 TABLET, FILM COATED ORAL at 08:44

## 2017-11-11 RX ADMIN — METOPROLOL TARTRATE 25 MG: 25 TABLET ORAL at 08:44

## 2017-11-11 RX ADMIN — DOCUSATE SODIUM 100 MG: 100 CAPSULE, LIQUID FILLED ORAL at 17:00

## 2017-11-11 RX ADMIN — TRAZODONE HYDROCHLORIDE 150 MG: 50 TABLET ORAL at 20:38

## 2017-11-11 NOTE — PROGRESS NOTES
Progress Note  Ariel Betancourt MD    Patient ID:  Name:  Nicanor Haley  MRN:  [id number]  1950  67 y.o.  male          CC/Reason for visit:gallstone pancreatitis    Subjective:Resting post surgery. C/o abdominal soreness. Tolerating liquids today so far. Drain in place. RN to pull today. Tmax 99.3    Vitals:  Vitals:    11/10/17 2332 11/11/17 0500 11/11/17 0512 11/11/17 0736   BP: 142/66 142/66  128/65   BP Location: Left arm Left arm  Left arm   Patient Position: Lying Lying  Lying   Pulse: 63 71     Resp: 18   18   Temp: 98.8 °F (37.1 °C) 99.3 °F (37.4 °C)  98.7 °F (37.1 °C)   TempSrc: Oral Oral  Oral   SpO2: 95% 90% 94% 95%   Weight:       Height:               Body mass index is 44.89 kg/(m^2).    Intake/Output Summary (Last 24 hours) at 11/11/17 1143  Last data filed at 11/11/17 1100   Gross per 24 hour   Intake             3260 ml   Output              535 ml   Net             2725 ml       Exam:  GEN:  No distress, appears stated age  EYES:   EOM-i, anicteric sclera bilat  ENT:    External ears/nose normal, OP clear  NECK:  Supple, midline trachea  LUNGS: Clear breath sounds bilat, nonlabored breathing  CV:  Normal S1S2, without murmur, no edema  ABD:   tender post surgery, no enlarged liver or masses  EXT:  No cyanosis or clubbing    Scheduled meds:      atorvastatin 10 mg Oral Daily   docusate sodium 100 mg Oral BID   hydrochlorothiazide 25 mg Oral Daily   isosorbide mononitrate 30 mg Oral Daily   levothyroxine 125 mcg Oral QAM   metoprolol tartrate 25 mg Oral Q12H   probenecid 500 mg Oral BID   sertraline 100 mg Oral Daily   traZODone 150 mg Oral Nightly     IV meds:                          sodium chloride 0.9 % with KCl 20 mEq 75 mL/hr Last Rate: 75 mL/hr (11/11/17 0002)       Data Review:   I reviewed the patient's medications and new clinical results.  Lab Results   Component Value Date    CALCIUM 8.7 11/09/2017       Results from last 7 days  Lab Units 11/09/17  0551 11/08/17  0625 11/06/17  6386    SODIUM mmol/L 136 140 144   POTASSIUM mmol/L 3.2* 3.5 3.6   CHLORIDE mmol/L 97* 100 100   CO2 mmol/L 25.9 29.7* 30.0*   BUN mg/dL 11 14 20   CREATININE mg/dL 0.71* 0.83 0.96   CALCIUM mg/dL 8.7 9.0 9.5   BILIRUBIN mg/dL 1.1 0.9 0.9   ALK PHOS U/L 49 48 61   ALT (SGPT) U/L 40 64* 88*   AST (SGOT) U/L 16 34 183*   GLUCOSE mg/dL 103* 102* 146*   WBC 10*3/mm3  --  8.54 6.63   HEMOGLOBIN g/dL  --  13.7 14.1   PLATELETS 10*3/mm3  --  113* 120*               Results from last 7 days  Lab Units 11/06/17  2312   TROPONIN T ng/mL <0.010           Estimated Creatinine Clearance: 123.7 mL/min (by C-G formula based on Cr of 0.71).    WEIGHTS:     Wt Readings from Last 1 Encounters:   11/10/17 1327 (!) 304 lb (138 kg)   11/07/17 0447 (!) 309 lb 3.2 oz (140 kg)   11/06/17 2240 (!) 304 lb (138 kg)         ASSESSMENT:   Principal Problem:    Acute pancreatitis due to calculus of common bile duct  Active Problems:    Abdominal pain, generalized   S/P surgery    hypokalemia    PLAN:  Continue postoperative care  D/c IV fluids  Change to oral meds  Advance diet as tolerated  Labs in         Shara Cox MD  11/11/2017

## 2017-11-11 NOTE — PROGRESS NOTES
Progress Note  Ariel Betancourt MD    Patient ID:  Name:  Nicanor Haley  MRN:  [id number]  1950  67 y.o.  male          My Note For 11/10/2017.Saw the patients,examined. Came home to finish computer notes and the system did not allow me to sign up.So writing the note today.    CC/Reason for visit:gallstone pancreatitis    Subjective:Resting post surgery     Vitals:  Vitals:    11/10/17 2332 11/11/17 0500 11/11/17 0512 11/11/17 0736   BP: 142/66 142/66  128/65   BP Location: Left arm Left arm  Left arm   Patient Position: Lying Lying  Lying   Pulse: 63 71     Resp: 18   18   Temp: 98.8 °F (37.1 °C) 99.3 °F (37.4 °C)  98.7 °F (37.1 °C)   TempSrc: Oral Oral  Oral   SpO2: 95% 90% 94% 95%   Weight:       Height:               Body mass index is 44.89 kg/(m^2).    Intake/Output Summary (Last 24 hours) at 11/11/17 1003  Last data filed at 11/11/17 0609   Gross per 24 hour   Intake             3140 ml   Output              515 ml   Net             2625 ml       Exam:  GEN:  No distress, appears stated age  EYES:   EOM-i, anicteric sclera bilat  ENT:    External ears/nose normal, OP clear  NECK:  Supple, midline trachea  LUNGS: Clear breath sounds bilat, nonlabored breathing  CV:  Normal S1S2, without murmur, no edema  ABD:   tender post surgery, no enlarged liver or masses  EXT:  No cyanosis or clubbing    Scheduled meds:    atorvastatin 10 mg Oral Daily   docusate sodium 100 mg Oral BID   hydrochlorothiazide 25 mg Oral Daily   isosorbide mononitrate 30 mg Oral Daily   levothyroxine 125 mcg Oral QAM   metoprolol tartrate 25 mg Oral Q12H   probenecid 500 mg Oral BID   sertraline 100 mg Oral Daily   traZODone 150 mg Oral Nightly     IV meds:                        sodium chloride 0.9 % with KCl 20 mEq 75 mL/hr Last Rate: 75 mL/hr (11/11/17 0002)       Data Review:   I reviewed the patient's medications and new clinical results.  Lab Results   Component Value Date    CALCIUM 8.7 11/09/2017       Results from last 7 days  Lab  Units 11/09/17  0551 11/08/17  0625 11/06/17  2312   SODIUM mmol/L 136 140 144   POTASSIUM mmol/L 3.2* 3.5 3.6   CHLORIDE mmol/L 97* 100 100   CO2 mmol/L 25.9 29.7* 30.0*   BUN mg/dL 11 14 20   CREATININE mg/dL 0.71* 0.83 0.96   CALCIUM mg/dL 8.7 9.0 9.5   BILIRUBIN mg/dL 1.1 0.9 0.9   ALK PHOS U/L 49 48 61   ALT (SGPT) U/L 40 64* 88*   AST (SGOT) U/L 16 34 183*   GLUCOSE mg/dL 103* 102* 146*   WBC 10*3/mm3  --  8.54 6.63   HEMOGLOBIN g/dL  --  13.7 14.1   PLATELETS 10*3/mm3  --  113* 120*               Results from last 7 days  Lab Units 11/06/17  2312   TROPONIN T ng/mL <0.010           Estimated Creatinine Clearance: 123.7 mL/min (by C-G formula based on Cr of 0.71).    WEIGHTS:     Wt Readings from Last 1 Encounters:   11/10/17 1327 (!) 304 lb (138 kg)   11/07/17 0447 (!) 309 lb 3.2 oz (140 kg)   11/06/17 2240 (!) 304 lb (138 kg)         ASSESSMENT:   Principal Problem:    Acute pancreatitis due to calculus of common bile duct  Active Problems:    Abdominal pain, generalized   S/P surgery    PLAN:  Continue postoperative care  Stable        Ariel Betancourt MD  11/11/2017

## 2017-11-11 NOTE — PLAN OF CARE
Problem: Patient Care Overview (Adult)  Goal: Plan of Care Review  Outcome: Ongoing (interventions implemented as appropriate)    11/11/17 0079   Outcome Evaluation   Outcome Summary/Follow up Plan up in chair; hypoactive bowel sounds; tolerated diet; JB drain dc'd today; encouraged ambulation but presently refuses   Coping/Psychosocial Response Interventions   Plan Of Care Reviewed With patient       Goal: Adult Individualization and Mutuality  Outcome: Ongoing (interventions implemented as appropriate)  Goal: Discharge Needs Assessment  Outcome: Ongoing (interventions implemented as appropriate)    Problem: Pain, Acute (Adult)  Goal: Acceptable Pain Control/Comfort Level  Outcome: Ongoing (interventions implemented as appropriate)    Problem: Fall Risk (Adult)  Goal: Absence of Falls  Outcome: Ongoing (interventions implemented as appropriate)    Problem: Pancreatitis, Acute/Chronic (Adult)  Goal: Signs and Symptoms of Listed Potential Problems Will be Absent or Manageable (Pancreatitis, Acute/Chronic)  Outcome: Ongoing (interventions implemented as appropriate)

## 2017-11-11 NOTE — PLAN OF CARE
Problem: Patient Care Overview (Adult)  Goal: Plan of Care Review  Outcome: Ongoing (interventions implemented as appropriate)    11/11/17 0338   Patient Care Overview   Progress improving   Outcome Evaluation   Outcome Summary/Follow up Plan s/p lap janelle yesterday, VSS, NSR to SB on monitor; enc po fluids; 4 lap sites to abd with dermabond & 1 with 2x2 gauze over dermabond due to mole knicked; +bm last pm though BS hypoactive; JB drain drsg changed x1, mod amount of drg to self sx; cpap at hs with home settings; iv pain and nausea meds given x1 this shift; has called out for meds at 3am and found pt snoring asleep, pt has not yet called back out for med request at this time; ivf's infusing; am labs ordered.   Coping/Psychosocial Response Interventions   Plan Of Care Reviewed With patient       Goal: Adult Individualization and Mutuality  Outcome: Ongoing (interventions implemented as appropriate)    Problem: Pain, Acute (Adult)  Goal: Acceptable Pain Control/Comfort Level  Outcome: Ongoing (interventions implemented as appropriate)    Problem: Fall Risk (Adult)  Goal: Absence of Falls  Outcome: Ongoing (interventions implemented as appropriate)    Problem: Perioperative Period (Adult)  Goal: Signs and Symptoms of Listed Potential Problems Will be Absent or Manageable (Perioperative Period)  Outcome: Outcome(s) achieved Date Met:  11/11/17    Problem: Pancreatitis, Acute/Chronic (Adult)  Goal: Signs and Symptoms of Listed Potential Problems Will be Absent or Manageable (Pancreatitis, Acute/Chronic)  Outcome: Ongoing (interventions implemented as appropriate)

## 2017-11-12 VITALS
HEART RATE: 66 BPM | BODY MASS INDEX: 45.03 KG/M2 | OXYGEN SATURATION: 94 % | DIASTOLIC BLOOD PRESSURE: 66 MMHG | HEIGHT: 69 IN | WEIGHT: 304 LBS | TEMPERATURE: 98.3 F | SYSTOLIC BLOOD PRESSURE: 114 MMHG | RESPIRATION RATE: 18 BRPM

## 2017-11-12 LAB
AMYLASE SERPL-CCNC: 26 U/L (ref 28–100)
ANION GAP SERPL CALCULATED.3IONS-SCNC: 10.1 MMOL/L
BUN BLD-MCNC: 14 MG/DL (ref 8–23)
BUN/CREAT SERPL: 15.2 (ref 7–25)
CALCIUM SPEC-SCNC: 8.7 MG/DL (ref 8.6–10.5)
CHLORIDE SERPL-SCNC: 97 MMOL/L (ref 98–107)
CO2 SERPL-SCNC: 31.9 MMOL/L (ref 22–29)
CREAT BLD-MCNC: 0.92 MG/DL (ref 0.76–1.27)
GFR SERPL CREATININE-BSD FRML MDRD: 82 ML/MIN/1.73
GLUCOSE BLD-MCNC: 120 MG/DL (ref 65–99)
LIPASE SERPL-CCNC: 10 U/L (ref 13–60)
POTASSIUM BLD-SCNC: 3.4 MMOL/L (ref 3.5–5.2)
POTASSIUM BLD-SCNC: 3.5 MMOL/L (ref 3.5–5.2)
SODIUM BLD-SCNC: 139 MMOL/L (ref 136–145)

## 2017-11-12 PROCEDURE — 82150 ASSAY OF AMYLASE: CPT | Performed by: INTERNAL MEDICINE

## 2017-11-12 PROCEDURE — 83690 ASSAY OF LIPASE: CPT | Performed by: INTERNAL MEDICINE

## 2017-11-12 PROCEDURE — 80048 BASIC METABOLIC PNL TOTAL CA: CPT | Performed by: INTERNAL MEDICINE

## 2017-11-12 PROCEDURE — 84132 ASSAY OF SERUM POTASSIUM: CPT | Performed by: INTERNAL MEDICINE

## 2017-11-12 RX ORDER — POTASSIUM CHLORIDE 750 MG/1
10 CAPSULE, EXTENDED RELEASE ORAL ONCE
Status: COMPLETED | OUTPATIENT
Start: 2017-11-12 | End: 2017-11-12

## 2017-11-12 RX ADMIN — ONDANSETRON 4 MG: 4 TABLET, ORALLY DISINTEGRATING ORAL at 00:06

## 2017-11-12 RX ADMIN — ISOSORBIDE MONONITRATE 30 MG: 30 TABLET ORAL at 09:41

## 2017-11-12 RX ADMIN — POTASSIUM CHLORIDE 10 MEQ: 750 CAPSULE, EXTENDED RELEASE ORAL at 12:26

## 2017-11-12 RX ADMIN — HYDROMORPHONE HYDROCHLORIDE 4 MG: 2 TABLET ORAL at 07:27

## 2017-11-12 RX ADMIN — ATORVASTATIN CALCIUM 10 MG: 10 TABLET, FILM COATED ORAL at 09:41

## 2017-11-12 RX ADMIN — HYDROMORPHONE HYDROCHLORIDE 4 MG: 2 TABLET ORAL at 00:05

## 2017-11-12 RX ADMIN — LEVOTHYROXINE SODIUM 125 MCG: 125 TABLET ORAL at 07:28

## 2017-11-12 RX ADMIN — HYDROCHLOROTHIAZIDE 25 MG: 25 TABLET ORAL at 09:41

## 2017-11-12 RX ADMIN — SERTRALINE 100 MG: 100 TABLET, FILM COATED ORAL at 09:41

## 2017-11-12 RX ADMIN — METOPROLOL TARTRATE 25 MG: 25 TABLET ORAL at 09:41

## 2017-11-12 RX ADMIN — PROBENECID 500 MG: 500 TABLET, FILM COATED ORAL at 09:58

## 2017-11-12 RX ADMIN — DOCUSATE SODIUM 100 MG: 100 CAPSULE, LIQUID FILLED ORAL at 09:41

## 2017-11-12 NOTE — DISCHARGE SUMMARY
PHYSICIAN DISCHARGE SUMMARY                                                                        [facility]    Patient Identification:  Name: Nicanor Haley  Age: 67 y.o.  Sex: male  :  1950  MRN: [id number]  Primary Care Physician: Ariel Betancourt MD    Admit date: 2017  Discharge date and time:17  Discharged Condition: good    Discharge Diagnoses:Principal Problem:    Acute pancreatitis due to calculus of common bile duct  Active Problems:    Abdominal pain, generalized     Patient Active Problem List   Diagnosis Code   • History of colon polyps Z86.010   • Acute pancreatitis due to calculus of common bile duct K85.90, K80.50   • Abdominal pain, generalized R10.84       PMHX:   Past Medical History:   Diagnosis Date   • Arthritis    • Disease of thyroid gland    • Gout    • Hyperlipemia    • Hypertension    • Sleep apnea      PSHX:   Past Surgical History:   Procedure Laterality Date   • ANKLE SURGERY     • COLON SURGERY     • COLONOSCOPY N/A 3/15/2017    Procedure: COLONOSCOPY TO ANASTAMOSIS AND TI WITH COLD SNARE POLYPECTOMY ;  Surgeon: Elian Carnes MD;  Location: [facility] ENDOSCOPY;  Service:    • JOINT REPLACEMENT Bilateral     Knee   • KNEE SURGERY     • NOSE SURGERY         Hospital Course: Nicanor Haley is a 67 y.o. male who presentedcomplaining of lower mid sternal chest pain and epigastric pain, associated with diaphoresis, chills, SOA, nausea, and vomiting. CT scan abdomen revealed moderately large gallstone in the common bile duct.He was started iv fluids and pain control, NPO. Dr. Carnes was  consulted, and the pt is now s/pLaparoscopic cholecystectomy .  He did well post-operatively, and is ready for discharge home today. His hypokalemia has resolved. He was noted on occasion to have O2 sats 89% while sleeping. He does have a h/o THA. Was seen in past by Dr Meza. May need re-eval as  outpt.    Consults:     Consults     Date and Time Order Name Status Description    11/7/2017 1013 Inpatient Consult to Gastroenterology Completed     11/7/2017 0702 Inpatient Consult to General Surgery      11/7/2017 0120 Family Medicine Consult Completed           Results from last 7 days  Lab Units 11/08/17  0625   WBC 10*3/mm3 8.54   HEMOGLOBIN g/dL 13.7   HEMATOCRIT % 42.0   PLATELETS 10*3/mm3 113*       Results from last 7 days  Lab Units 11/12/17  1022 11/12/17  0911   SODIUM mmol/L  --  139   POTASSIUM mmol/L 3.4* 3.5   CHLORIDE mmol/L  --  97*   CO2 mmol/L  --  31.9*   BUN mg/dL  --  14   CREATININE mg/dL  --  0.92   GLUCOSE mg/dL  --  120*   CALCIUM mg/dL  --  8.7     Significant Diagnostic Studies:   Imaging Results (all)     Procedure Component Value Units Date/Time    XR Chest 2 View [[id number]] Collected:  11/06/17 2347     Updated:  11/10/17 0010    Narrative:       CHEST PA AND LATERAL.     HISTORY: Chest pain.     COMPARISON: 04/05/2017.     FINDINGS:  Cardiomediastinal silhouette is within normal limits.         There is no consolidation or effusion. Low lung volumes and pulmonary  congestion.          Impression:       No acute findings.         This report was finalized on 11/10/2017 12:07 AM by Dr. Roderick Jane MD.       CT Abdomen Pelvis With Contrast [[id number]] Collected:  11/07/17 0025     Updated:  11/10/17 0032    Narrative:       CT ABDOMEN AND PELVIS WITH CONTRAST.     TECHNIQUE: Radiation dose reduction techniques were utilized, including  automated exposure control and exposure modulation based on body size. A  routine CT scan of the abdomen and pelvis was performed with coronal and  sagittal reconstructed images.     HISTORY: Abdominal pain.     COMPARISON: No prior studies for comparison.     FINDINGS:  Lung bases demonstrate no consolidation or effusion.          Liver demonstrates homogeneous parenchyma, no definite mass. Tiny stones  in the gallbladder measure up to 0.3 cm. No  "intra or extrahepatic  biliary ductal dilatation.      The spleen is unremarkable. Moderately severe fat stranding around the  pancreatic head and neck. No peripancreatic fluid collection is seen.  Questionable 0.3 cm density in the distal common bile duct which may be  a stone within the common bile duct, common bile duct is dilated to 1.2  cm. Adrenal glands are within normal limits.     Kidneys do not demonstrate hydronephrosis. No definite renal calculi.  0.8 cm cyst in the inferior pole left kidney. Urinary bladder is  unremarkable.         Stomach is distended with fluid, postsurgical changes of the stomach.  Small bowel loops are within normal limits. Appendix is not visualized.  Colonic diverticulosis.     No significant retroperitoneal lymphadenopathy.              Impression:       1.  Moderately severe pancreatitis, no pseudocyst is seen.   2.  Few gallstones measuring up to 0.3 cm.  3.  Common bile duct is distended to 1.1 cm, in the distal common bile  there may be a 0.3 cm stone. MRCP is recommended.      This report was finalized on 11/10/2017 12:29 AM by Dr. Roderick Jane MD.       FL Cholangiogram Operative [[id number]] Collected:  11/10/17 1633     Updated:  11/10/17 1641    Narrative:       FLUOROSCOPICALLY GUIDED INTRAOPERATIVE CHOLANGIOGRAPHY     HISTORY: Male who is 67 years-old, with a history of gallbladder  disease.     PROCEDURE: Multiple real-time images were obtained with fluoroscopic  guidance during injection of the biliary system performed as part of an  intraoperative cholangiogram during laparoscopic cholecystectomy  performed by Dr. TACOS CAMEJO.     FINDINGS: The visualized biliary system demonstrates no abnormality.     FLUOROSCOPY TIME: 7 seconds, 37 images.     This report was finalized on 11/10/2017 4:38 PM by Dr. Helio Moreau MD.           /66 (BP Location: Left arm, Patient Position: Lying)  Pulse 66  Temp 98.3 °F (36.8 °C) (Oral)   Resp 18  Ht 69\" (175.3 cm)  Wt " (!) 304 lb (138 kg)  SpO2 94%  BMI 44.89 kg/m2    Discharge Exam:  GEN:                                   No distress, appears stated age  EYES:                                  EOM-i, anicteric sclera bilat  ENT:                                    External ears/nose normal, OP clear  NECK:                       Supple, midline trachea  LUNGS:                     Clear breath sounds bilat, nonlabored breathing  CV:                                      Normal S1S2, without murmur, no edema  ABD:                                     tender post surgery, no enlarged liver or masses  EXT:                                    No cyanosis or clubbing   Disposition:  Home    Patient Instructions:    Nicanor Haley   Home Medication Instructions CLAUDIA:[id number]    Printed on:11/12/17 4892   Medication Information                      calcium carbonate-cholecalciferol 500-400 MG-UNIT tablet tablet  Take 1 tablet by mouth Daily.             hydrochlorothiazide (HYDRODIURIL) 25 MG tablet  Take 25 mg by mouth Daily.             HYDROmorphone (DILAUDID) 4 MG tablet  Take 4 mg by mouth Every 4 (Four) Hours As Needed for Moderate Pain (4-6).             isosorbide mononitrate (IMDUR) 60 MG 24 hr tablet  Take 30 mg by mouth Daily.             levothyroxine (SYNTHROID, LEVOTHROID) 25 MCG tablet  Take 125 mcg by mouth Daily.             metoprolol tartrate (LOPRESSOR) 25 MG tablet  Take 25 mg by mouth 2 (Two) Times a Day. Pt unsure of dose             probenecid (BENEMID) 500 MG tablet  Take 500 mg by mouth 2 (Two) Times a Day.             sertraline (ZOLOFT) 100 MG tablet  Take 100 mg by mouth Daily. Pt unsure of dose             simvastatin (ZOCOR) 20 MG tablet  Take 40 mg by mouth Every Night. Pt unsure of dose              traZODone (DESYREL) 150 MG tablet  Take 150 mg by mouth Every Night.               No future appointments.  Additional Instructions for the Follow-ups that You Need to Schedule     Discharge Follow-up with  Specified Provider: 1 Week    As directed    Follow Up:  1 Week             Follow-up Information     Follow up with Ariel Betancourt MD .    Specialties:  Internal Medicine, Hospitalist    Contact information:    [address] [place] [address]  [phone]          Discharge Order     Start     Ordered    11/11/17 0635  Discharge patient  Once     Expected Discharge Date:  11/11/17    Discharge Disposition:  Home or Self Care        11/11/17 0634          Total time spent discharging patient including evaluation,post hospitalization follow up,  medication and post hospitalization instructions and education total time exceeds 30 minutes.    Signed:  Shara Cox MD  11/12/2017  10:57 AM

## 2017-11-12 NOTE — PLAN OF CARE
Problem: Patient Care Overview (Adult)  Goal: Plan of Care Review  Outcome: Ongoing (interventions implemented as appropriate)    11/12/17 0341   Patient Care Overview   Progress improving   Outcome Evaluation   Outcome Summary/Follow up Plan tolerating regular diet well, ambulated with assist x1 & walker around nsg station twice last pm; c/o back pain x1 and received prn po dilaudid; with po zofran; cpap on with oxygen 2-3 L to maintain sats >90%; pt reports no home oxygen use and only cpap at hs. pt prefers to lay flat, while asleep hob elevated to 30degrees which was effective in raising sats with oxygen in line cpap; lap sites x4 to abd marycarmen with dermabond c/d/i; oneal drain removed yesterday dry drsg c/d/i; nsr on monitor   Coping/Psychosocial Response Interventions   Plan Of Care Reviewed With patient       Goal: Adult Individualization and Mutuality  Outcome: Ongoing (interventions implemented as appropriate)    Problem: Pain, Acute (Adult)  Goal: Acceptable Pain Control/Comfort Level  Outcome: Ongoing (interventions implemented as appropriate)    Problem: Fall Risk (Adult)  Goal: Absence of Falls  Outcome: Ongoing (interventions implemented as appropriate)    Problem: Pancreatitis, Acute/Chronic (Adult)  Goal: Signs and Symptoms of Listed Potential Problems Will be Absent or Manageable (Pancreatitis, Acute/Chronic)  Outcome: Ongoing (interventions implemented as appropriate)

## 2017-11-12 NOTE — SIGNIFICANT NOTE
11/12/17 1314   Rehab Treatment   Discipline physical therapist   Rehab Evaluation   Evaluation Not Performed other (see comments)  (Pt discharging from facility. Per RN, do not need to see pt prior to discharge. )

## 2017-11-13 NOTE — PROGRESS NOTES
Case Management Discharge Note    Final Note: pt dc'd to home 11/11/17    Discharge Placement     No information found        Other: Other (via private auto)    Discharge Codes: 01  Discharge to home

## 2017-11-20 ENCOUNTER — OFFICE VISIT (OUTPATIENT)
Dept: SURGERY | Facility: CLINIC | Age: 67
End: 2017-11-20

## 2017-11-20 DIAGNOSIS — Z09 FOLLOW UP: Primary | ICD-10-CM

## 2017-11-20 PROCEDURE — 99024 POSTOP FOLLOW-UP VISIT: CPT | Performed by: SURGERY

## 2017-11-20 NOTE — PROGRESS NOTES
Postoperative visit    Laparoscopic cholecystectomy with cholangiogram (for gallstone pancreatitis) 11/10/2017  Pathology: Mild chronic cholecystitis with biliary sediment and miniscule gallstone fragments  Cholangiogram normal    Preoperative CT scan demonstrated moderately severe pancreatitis with a few gallstones and dilated common bile duct    Office visit: Incisions are healed well and overall he is doing fine.  He does complain of some postprandial urgency and I recommended that he try Imodium before meals and at bedtime as needed for loose bowel movements and postprandial urgency.  He'll follow-up with me on an as-needed basis.

## 2017-12-17 ENCOUNTER — ANESTHESIA EVENT (OUTPATIENT)
Dept: PERIOP | Facility: HOSPITAL | Age: 67
End: 2017-12-17

## 2017-12-17 ENCOUNTER — APPOINTMENT (OUTPATIENT)
Dept: CT IMAGING | Facility: HOSPITAL | Age: 67
End: 2017-12-17

## 2017-12-17 ENCOUNTER — APPOINTMENT (OUTPATIENT)
Dept: GENERAL RADIOLOGY | Facility: HOSPITAL | Age: 67
End: 2017-12-17

## 2017-12-17 ENCOUNTER — ANESTHESIA (OUTPATIENT)
Dept: PERIOP | Facility: HOSPITAL | Age: 67
End: 2017-12-17

## 2017-12-17 ENCOUNTER — HOSPITAL ENCOUNTER (INPATIENT)
Facility: HOSPITAL | Age: 67
LOS: 6 days | Discharge: NURSING, REHAB OR LONG-TERM CARE | End: 2017-12-23
Attending: EMERGENCY MEDICINE | Admitting: SPECIALIST

## 2017-12-17 DIAGNOSIS — S83.005A DISLOCATION OF LEFT PATELLA, INITIAL ENCOUNTER: ICD-10-CM
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
## 2017-12-17 DIAGNOSIS — S82.402A FRACTURE TIBIA/FIBULA, LEFT, CLOSED, INITIAL ENCOUNTER: ICD-10-CM

## 2017-12-17 DIAGNOSIS — S00.03XA CONTUSION OF SCALP, INITIAL ENCOUNTER: ICD-10-CM

## 2017-12-17 DIAGNOSIS — M62.81 MUSCLE WEAKNESS (GENERALIZED): ICD-10-CM

## 2017-12-17 DIAGNOSIS — S82.202A FRACTURE TIBIA/FIBULA, LEFT, CLOSED, INITIAL ENCOUNTER: ICD-10-CM

## 2017-12-17 LAB
ABO GROUP BLD: NORMAL
ALBUMIN SERPL-MCNC: 4.1 G/DL (ref 3.5–5.2)
ALBUMIN/GLOB SERPL: 1.1 G/DL
ALP SERPL-CCNC: 61 U/L (ref 39–117)
ALT SERPL W P-5'-P-CCNC: 15 U/L (ref 1–41)
ANION GAP SERPL CALCULATED.3IONS-SCNC: 11.4 MMOL/L
APTT PPP: 22.9 SECONDS (ref 22.7–35.4)
AST SERPL-CCNC: 16 U/L (ref 1–40)
BASOPHILS # BLD AUTO: 0.03 10*3/MM3 (ref 0–0.2)
BASOPHILS NFR BLD AUTO: 0.7 % (ref 0–1.5)
BILIRUB SERPL-MCNC: 0.6 MG/DL (ref 0.1–1.2)
BLD GP AB SCN SERPL QL: NEGATIVE
BUN BLD-MCNC: 17 MG/DL (ref 8–23)
BUN/CREAT SERPL: 16 (ref 7–25)
CALCIUM SPEC-SCNC: 9.8 MG/DL (ref 8.6–10.5)
CHLORIDE SERPL-SCNC: 99 MMOL/L (ref 98–107)
CO2 SERPL-SCNC: 31.6 MMOL/L (ref 22–29)
CREAT BLD-MCNC: 1.06 MG/DL (ref 0.76–1.27)
DEPRECATED RDW RBC AUTO: 46.1 FL (ref 37–54)
EOSINOPHIL # BLD AUTO: 0.16 10*3/MM3 (ref 0–0.7)
EOSINOPHIL NFR BLD AUTO: 3.9 % (ref 0.3–6.2)
ERYTHROCYTE [DISTWIDTH] IN BLOOD BY AUTOMATED COUNT: 13.5 % (ref 11.5–14.5)
GFR SERPL CREATININE-BSD FRML MDRD: 70 ML/MIN/1.73
GLOBULIN UR ELPH-MCNC: 3.8 GM/DL
GLUCOSE BLD-MCNC: 103 MG/DL (ref 65–99)
HCT VFR BLD AUTO: 43.9 % (ref 40.4–52.2)
HGB BLD-MCNC: 14.5 G/DL (ref 13.7–17.6)
HOLD SPECIMEN: NORMAL
HOLD SPECIMEN: NORMAL
IMM GRANULOCYTES # BLD: 0.04 10*3/MM3 (ref 0–0.03)
IMM GRANULOCYTES NFR BLD: 1 % (ref 0–0.5)
INR PPP: 1.07 (ref 0.9–1.1)
LYMPHOCYTES # BLD AUTO: 1.31 10*3/MM3 (ref 0.9–4.8)
LYMPHOCYTES NFR BLD AUTO: 32 % (ref 19.6–45.3)
MCH RBC QN AUTO: 30.9 PG (ref 27–32.7)
MCHC RBC AUTO-ENTMCNC: 33 G/DL (ref 32.6–36.4)
MCV RBC AUTO: 93.4 FL (ref 79.8–96.2)
MONOCYTES # BLD AUTO: 0.46 10*3/MM3 (ref 0.2–1.2)
MONOCYTES NFR BLD AUTO: 11.2 % (ref 5–12)
NEUTROPHILS # BLD AUTO: 2.1 10*3/MM3 (ref 1.9–8.1)
NEUTROPHILS NFR BLD AUTO: 51.2 % (ref 42.7–76)
NRBC BLD MANUAL-RTO: 0 /100 WBC (ref 0–0)
PLATELET # BLD AUTO: 138 10*3/MM3 (ref 140–500)
PMV BLD AUTO: 10.6 FL (ref 6–12)
POTASSIUM BLD-SCNC: 3.4 MMOL/L (ref 3.5–5.2)
PROT SERPL-MCNC: 7.9 G/DL (ref 6–8.5)
PROTHROMBIN TIME: 13.5 SECONDS (ref 11.7–14.2)
RBC # BLD AUTO: 4.7 10*6/MM3 (ref 4.6–6)
RH BLD: NEGATIVE
SODIUM BLD-SCNC: 142 MMOL/L (ref 136–145)
WBC NRBC COR # BLD: 4.1 10*3/MM3 (ref 4.5–10.7)
WHOLE BLOOD HOLD SPECIMEN: NORMAL
WHOLE BLOOD HOLD SPECIMEN: NORMAL

## 2017-12-17 PROCEDURE — 25010000002 ONDANSETRON PER 1 MG: Performed by: EMERGENCY MEDICINE

## 2017-12-17 PROCEDURE — 25010000002 HYDROMORPHONE PER 4 MG: Performed by: EMERGENCY MEDICINE

## 2017-12-17 PROCEDURE — C1713 ANCHOR/SCREW BN/BN,TIS/BN: HCPCS | Performed by: ORTHOPAEDIC SURGERY

## 2017-12-17 PROCEDURE — 25010000002 PROPOFOL 10 MG/ML EMULSION: Performed by: ANESTHESIOLOGY

## 2017-12-17 PROCEDURE — 85610 PROTHROMBIN TIME: CPT | Performed by: ORTHOPAEDIC SURGERY

## 2017-12-17 PROCEDURE — 25010000002 MORPHINE PER 10 MG: Performed by: EMERGENCY MEDICINE

## 2017-12-17 PROCEDURE — 25010000002 FENTANYL CITRATE (PF) 100 MCG/2ML SOLUTION: Performed by: ANESTHESIOLOGY

## 2017-12-17 PROCEDURE — 73700 CT LOWER EXTREMITY W/O DYE: CPT

## 2017-12-17 PROCEDURE — 85730 THROMBOPLASTIN TIME PARTIAL: CPT | Performed by: ORTHOPAEDIC SURGERY

## 2017-12-17 PROCEDURE — 0QSH04Z REPOSITION LEFT TIBIA WITH INTERNAL FIXATION DEVICE, OPEN APPROACH: ICD-10-PCS | Performed by: ORTHOPAEDIC SURGERY

## 2017-12-17 PROCEDURE — 80053 COMPREHEN METABOLIC PANEL: CPT | Performed by: EMERGENCY MEDICINE

## 2017-12-17 PROCEDURE — C1887 CATHETER, GUIDING: HCPCS | Performed by: ORTHOPAEDIC SURGERY

## 2017-12-17 PROCEDURE — 25010000002 MIDAZOLAM PER 1 MG: Performed by: ANESTHESIOLOGY

## 2017-12-17 PROCEDURE — 25010000002 ONDANSETRON PER 1 MG: Performed by: ANESTHESIOLOGY

## 2017-12-17 PROCEDURE — 25010000002 KETOROLAC TROMETHAMINE PER 15 MG: Performed by: ANESTHESIOLOGY

## 2017-12-17 PROCEDURE — 25010000002 VANCOMYCIN PER 500 MG: Performed by: ORTHOPAEDIC SURGERY

## 2017-12-17 PROCEDURE — 86900 BLOOD TYPING SEROLOGIC ABO: CPT | Performed by: ORTHOPAEDIC SURGERY

## 2017-12-17 PROCEDURE — 99284 EMERGENCY DEPT VISIT MOD MDM: CPT

## 2017-12-17 PROCEDURE — 25010000002 DEXAMETHASONE PER 1 MG: Performed by: ANESTHESIOLOGY

## 2017-12-17 PROCEDURE — 25010000002 SUCCINYLCHOLINE PER 20 MG: Performed by: ANESTHESIOLOGY

## 2017-12-17 PROCEDURE — 73560 X-RAY EXAM OF KNEE 1 OR 2: CPT

## 2017-12-17 PROCEDURE — 94799 UNLISTED PULMONARY SVC/PX: CPT

## 2017-12-17 PROCEDURE — 25010000002 HYDROMORPHONE PER 4 MG: Performed by: ANESTHESIOLOGY

## 2017-12-17 PROCEDURE — 71010 HC CHEST PA OR AP: CPT

## 2017-12-17 PROCEDURE — 85025 COMPLETE CBC W/AUTO DIFF WBC: CPT | Performed by: EMERGENCY MEDICINE

## 2017-12-17 PROCEDURE — 72170 X-RAY EXAM OF PELVIS: CPT

## 2017-12-17 PROCEDURE — 86901 BLOOD TYPING SEROLOGIC RH(D): CPT | Performed by: ORTHOPAEDIC SURGERY

## 2017-12-17 PROCEDURE — 70450 CT HEAD/BRAIN W/O DYE: CPT

## 2017-12-17 PROCEDURE — 86850 RBC ANTIBODY SCREEN: CPT | Performed by: ORTHOPAEDIC SURGERY

## 2017-12-17 PROCEDURE — 73590 X-RAY EXAM OF LOWER LEG: CPT

## 2017-12-17 PROCEDURE — 76000 FLUOROSCOPY <1 HR PHYS/QHP: CPT

## 2017-12-17 DEVICE — LOCKING SCREW
Type: IMPLANTABLE DEVICE | Site: TIBIA | Status: FUNCTIONAL
Brand: AXSOS

## 2017-12-17 DEVICE — CORTEX SCREW
Type: IMPLANTABLE DEVICE | Site: TIBIA | Status: FUNCTIONAL
Brand: AXSOS

## 2017-12-17 DEVICE — LOCKING SCREW
Type: IMPLANTABLE DEVICE | Site: FIBULA | Status: FUNCTIONAL
Brand: VARIAX

## 2017-12-17 DEVICE — BONE SCREW
Type: IMPLANTABLE DEVICE | Site: FIBULA | Status: FUNCTIONAL
Brand: VARIAX

## 2017-12-17 DEVICE — CANNULATED SCREW
Type: IMPLANTABLE DEVICE | Site: TIBIA | Status: FUNCTIONAL
Brand: ASNIS

## 2017-12-17 DEVICE — DISTAL ANTEROLATERAL TIBIA PLATE
Type: IMPLANTABLE DEVICE | Site: TIBIA | Status: FUNCTIONAL
Brand: AXSOS

## 2017-12-17 DEVICE — KIRSCHNER WIRE WITH STOP WITH MM STOP
Type: IMPLANTABLE DEVICE | Site: FIBULA | Status: FUNCTIONAL
Brand: VARIAX

## 2017-12-17 DEVICE — DISTAL LATERAL FIBULA PLATE, 9 HOLE
Type: IMPLANTABLE DEVICE | Site: FIBULA | Status: FUNCTIONAL
Brand: VARIAX

## 2017-12-17 RX ORDER — PROBENECID 500 MG/1
500 TABLET, FILM COATED ORAL 2 TIMES DAILY
Status: DISCONTINUED | OUTPATIENT
Start: 2017-12-18 | End: 2017-12-18

## 2017-12-17 RX ORDER — MIDAZOLAM HYDROCHLORIDE 1 MG/ML
2 INJECTION INTRAMUSCULAR; INTRAVENOUS
Status: DISCONTINUED | OUTPATIENT
Start: 2017-12-17 | End: 2017-12-17 | Stop reason: HOSPADM

## 2017-12-17 RX ORDER — HYDROMORPHONE HYDROCHLORIDE 1 MG/ML
0.5 INJECTION, SOLUTION INTRAMUSCULAR; INTRAVENOUS; SUBCUTANEOUS
Status: DISCONTINUED | OUTPATIENT
Start: 2017-12-17 | End: 2017-12-23 | Stop reason: HOSPADM

## 2017-12-17 RX ORDER — HYDROCHLOROTHIAZIDE 25 MG/1
25 TABLET ORAL DAILY
Status: DISCONTINUED | OUTPATIENT
Start: 2017-12-18 | End: 2017-12-23 | Stop reason: HOSPADM

## 2017-12-17 RX ORDER — PROMETHAZINE HYDROCHLORIDE 25 MG/ML
12.5 INJECTION, SOLUTION INTRAMUSCULAR; INTRAVENOUS ONCE AS NEEDED
Status: DISCONTINUED | OUTPATIENT
Start: 2017-12-17 | End: 2017-12-17 | Stop reason: HOSPADM

## 2017-12-17 RX ORDER — HYDROMORPHONE HCL 110MG/55ML
PATIENT CONTROLLED ANALGESIA SYRINGE INTRAVENOUS AS NEEDED
Status: DISCONTINUED | OUTPATIENT
Start: 2017-12-17 | End: 2017-12-17 | Stop reason: SURG

## 2017-12-17 RX ORDER — HYDRALAZINE HYDROCHLORIDE 20 MG/ML
5 INJECTION INTRAMUSCULAR; INTRAVENOUS
Status: DISCONTINUED | OUTPATIENT
Start: 2017-12-17 | End: 2017-12-17 | Stop reason: HOSPADM

## 2017-12-17 RX ORDER — HYDROMORPHONE HYDROCHLORIDE 2 MG/1
4 TABLET ORAL
Status: DISCONTINUED | OUTPATIENT
Start: 2017-12-17 | End: 2017-12-23 | Stop reason: HOSPADM

## 2017-12-17 RX ORDER — LABETALOL HYDROCHLORIDE 5 MG/ML
5 INJECTION, SOLUTION INTRAVENOUS
Status: DISCONTINUED | OUTPATIENT
Start: 2017-12-17 | End: 2017-12-17 | Stop reason: HOSPADM

## 2017-12-17 RX ORDER — ISOSORBIDE MONONITRATE 30 MG/1
30 TABLET, EXTENDED RELEASE ORAL DAILY
Status: DISCONTINUED | OUTPATIENT
Start: 2017-12-18 | End: 2017-12-18

## 2017-12-17 RX ORDER — ISOSORBIDE MONONITRATE 30 MG/1
30 TABLET, EXTENDED RELEASE ORAL DAILY
COMMUNITY

## 2017-12-17 RX ORDER — FENTANYL CITRATE 50 UG/ML
INJECTION, SOLUTION INTRAMUSCULAR; INTRAVENOUS AS NEEDED
Status: DISCONTINUED | OUTPATIENT
Start: 2017-12-17 | End: 2017-12-17 | Stop reason: SURG

## 2017-12-17 RX ORDER — FENTANYL CITRATE 50 UG/ML
50 INJECTION, SOLUTION INTRAMUSCULAR; INTRAVENOUS
Status: DISCONTINUED | OUTPATIENT
Start: 2017-12-17 | End: 2017-12-17 | Stop reason: HOSPADM

## 2017-12-17 RX ORDER — MORPHINE SULFATE 2 MG/ML
2 INJECTION, SOLUTION INTRAMUSCULAR; INTRAVENOUS ONCE
Status: COMPLETED | OUTPATIENT
Start: 2017-12-17 | End: 2017-12-17

## 2017-12-17 RX ORDER — NALOXONE HCL 0.4 MG/ML
0.2 VIAL (ML) INJECTION AS NEEDED
Status: DISCONTINUED | OUTPATIENT
Start: 2017-12-17 | End: 2017-12-17 | Stop reason: HOSPADM

## 2017-12-17 RX ORDER — CLINDAMYCIN PHOSPHATE 900 MG/50ML
900 INJECTION INTRAVENOUS ONCE
Status: COMPLETED | OUTPATIENT
Start: 2017-12-17 | End: 2017-12-17

## 2017-12-17 RX ORDER — FENTANYL CITRATE 50 UG/ML
INJECTION, SOLUTION INTRAMUSCULAR; INTRAVENOUS
Status: DISPENSED
Start: 2017-12-17 | End: 2017-12-18

## 2017-12-17 RX ORDER — SERTRALINE HYDROCHLORIDE 100 MG/1
100 TABLET, FILM COATED ORAL DAILY
Status: DISCONTINUED | OUTPATIENT
Start: 2017-12-18 | End: 2017-12-18

## 2017-12-17 RX ORDER — OXYCODONE AND ACETAMINOPHEN 7.5; 325 MG/1; MG/1
1 TABLET ORAL ONCE AS NEEDED
Status: DISCONTINUED | OUTPATIENT
Start: 2017-12-17 | End: 2017-12-17 | Stop reason: HOSPADM

## 2017-12-17 RX ORDER — ATORVASTATIN CALCIUM 10 MG/1
10 TABLET, FILM COATED ORAL DAILY
Status: DISCONTINUED | OUTPATIENT
Start: 2017-12-18 | End: 2017-12-23 | Stop reason: HOSPADM

## 2017-12-17 RX ORDER — PROMETHAZINE HYDROCHLORIDE 25 MG/1
25 SUPPOSITORY RECTAL ONCE AS NEEDED
Status: DISCONTINUED | OUTPATIENT
Start: 2017-12-17 | End: 2017-12-17 | Stop reason: HOSPADM

## 2017-12-17 RX ORDER — FLUMAZENIL 0.1 MG/ML
0.2 INJECTION INTRAVENOUS AS NEEDED
Status: DISCONTINUED | OUTPATIENT
Start: 2017-12-17 | End: 2017-12-17 | Stop reason: HOSPADM

## 2017-12-17 RX ORDER — PROPOFOL 10 MG/ML
VIAL (ML) INTRAVENOUS AS NEEDED
Status: DISCONTINUED | OUTPATIENT
Start: 2017-12-17 | End: 2017-12-17 | Stop reason: SURG

## 2017-12-17 RX ORDER — DIPHENHYDRAMINE HYDROCHLORIDE 50 MG/ML
12.5 INJECTION INTRAMUSCULAR; INTRAVENOUS
Status: DISCONTINUED | OUTPATIENT
Start: 2017-12-17 | End: 2017-12-17 | Stop reason: HOSPADM

## 2017-12-17 RX ORDER — CLINDAMYCIN PHOSPHATE 600 MG/50ML
600 INJECTION INTRAVENOUS EVERY 8 HOURS
Status: COMPLETED | OUTPATIENT
Start: 2017-12-18 | End: 2017-12-18

## 2017-12-17 RX ORDER — HYDROMORPHONE HYDROCHLORIDE 1 MG/ML
0.5 INJECTION, SOLUTION INTRAMUSCULAR; INTRAVENOUS; SUBCUTANEOUS
Status: DISCONTINUED | OUTPATIENT
Start: 2017-12-17 | End: 2017-12-17 | Stop reason: HOSPADM

## 2017-12-17 RX ORDER — TRAZODONE HYDROCHLORIDE 150 MG/1
150 TABLET ORAL NIGHTLY
Status: DISCONTINUED | OUTPATIENT
Start: 2017-12-18 | End: 2017-12-18

## 2017-12-17 RX ORDER — HYDROCODONE BITARTRATE AND ACETAMINOPHEN 7.5; 325 MG/1; MG/1
1 TABLET ORAL ONCE AS NEEDED
Status: DISCONTINUED | OUTPATIENT
Start: 2017-12-17 | End: 2017-12-17 | Stop reason: HOSPADM

## 2017-12-17 RX ORDER — SODIUM CHLORIDE 0.9 % (FLUSH) 0.9 %
10 SYRINGE (ML) INJECTION AS NEEDED
Status: DISCONTINUED | OUTPATIENT
Start: 2017-12-17 | End: 2017-12-23 | Stop reason: HOSPADM

## 2017-12-17 RX ORDER — HYDROMORPHONE HYDROCHLORIDE 2 MG/1
6 TABLET ORAL
Status: DISCONTINUED | OUTPATIENT
Start: 2017-12-27 | End: 2017-12-23 | Stop reason: HOSPADM

## 2017-12-17 RX ORDER — ONDANSETRON 2 MG/ML
4 INJECTION INTRAMUSCULAR; INTRAVENOUS ONCE AS NEEDED
Status: DISCONTINUED | OUTPATIENT
Start: 2017-12-17 | End: 2017-12-17 | Stop reason: HOSPADM

## 2017-12-17 RX ORDER — ONDANSETRON 2 MG/ML
INJECTION INTRAMUSCULAR; INTRAVENOUS AS NEEDED
Status: DISCONTINUED | OUTPATIENT
Start: 2017-12-17 | End: 2017-12-17 | Stop reason: SURG

## 2017-12-17 RX ORDER — SODIUM CHLORIDE 0.9 % (FLUSH) 0.9 %
1-10 SYRINGE (ML) INJECTION AS NEEDED
Status: DISCONTINUED | OUTPATIENT
Start: 2017-12-17 | End: 2017-12-17 | Stop reason: HOSPADM

## 2017-12-17 RX ORDER — SIMVASTATIN 40 MG
40 TABLET ORAL DAILY
COMMUNITY
End: 2023-03-29 | Stop reason: HOSPADM

## 2017-12-17 RX ORDER — EPHEDRINE SULFATE 50 MG/ML
5 INJECTION, SOLUTION INTRAVENOUS ONCE AS NEEDED
Status: DISCONTINUED | OUTPATIENT
Start: 2017-12-17 | End: 2017-12-17 | Stop reason: HOSPADM

## 2017-12-17 RX ORDER — LEVOTHYROXINE SODIUM 0.12 MG/1
250 TABLET ORAL DAILY
COMMUNITY
End: 2017-12-23 | Stop reason: HOSPADM

## 2017-12-17 RX ORDER — PROMETHAZINE HYDROCHLORIDE 25 MG/1
12.5 TABLET ORAL ONCE AS NEEDED
Status: DISCONTINUED | OUTPATIENT
Start: 2017-12-17 | End: 2017-12-17 | Stop reason: HOSPADM

## 2017-12-17 RX ORDER — ROCURONIUM BROMIDE 10 MG/ML
INJECTION, SOLUTION INTRAVENOUS AS NEEDED
Status: DISCONTINUED | OUTPATIENT
Start: 2017-12-17 | End: 2017-12-17 | Stop reason: SURG

## 2017-12-17 RX ORDER — MIDAZOLAM HYDROCHLORIDE 1 MG/ML
1 INJECTION INTRAMUSCULAR; INTRAVENOUS
Status: DISCONTINUED | OUTPATIENT
Start: 2017-12-17 | End: 2017-12-17 | Stop reason: HOSPADM

## 2017-12-17 RX ORDER — ONDANSETRON 2 MG/ML
4 INJECTION INTRAMUSCULAR; INTRAVENOUS EVERY 6 HOURS PRN
Status: DISCONTINUED | OUTPATIENT
Start: 2017-12-17 | End: 2017-12-23 | Stop reason: HOSPADM

## 2017-12-17 RX ORDER — FAMOTIDINE 10 MG/ML
20 INJECTION, SOLUTION INTRAVENOUS ONCE
Status: COMPLETED | OUTPATIENT
Start: 2017-12-17 | End: 2017-12-17

## 2017-12-17 RX ORDER — HYDROMORPHONE HYDROCHLORIDE 1 MG/ML
0.5 INJECTION, SOLUTION INTRAMUSCULAR; INTRAVENOUS; SUBCUTANEOUS ONCE
Status: DISCONTINUED | OUTPATIENT
Start: 2017-12-17 | End: 2017-12-17

## 2017-12-17 RX ORDER — SUCCINYLCHOLINE CHLORIDE 20 MG/ML
INJECTION INTRAMUSCULAR; INTRAVENOUS AS NEEDED
Status: DISCONTINUED | OUTPATIENT
Start: 2017-12-17 | End: 2017-12-17 | Stop reason: SURG

## 2017-12-17 RX ORDER — VANCOMYCIN HYDROCHLORIDE 500 MG/10ML
INJECTION, POWDER, LYOPHILIZED, FOR SOLUTION INTRAVENOUS
Status: DISPENSED
Start: 2017-12-17 | End: 2017-12-18

## 2017-12-17 RX ORDER — HYDROMORPHONE HYDROCHLORIDE 4 MG/1
4 TABLET ORAL EVERY 6 HOURS PRN
Status: ON HOLD | COMMUNITY
End: 2017-12-23

## 2017-12-17 RX ORDER — KETOROLAC TROMETHAMINE 30 MG/ML
INJECTION, SOLUTION INTRAMUSCULAR; INTRAVENOUS AS NEEDED
Status: DISCONTINUED | OUTPATIENT
Start: 2017-12-17 | End: 2017-12-17 | Stop reason: SURG

## 2017-12-17 RX ORDER — LEVOTHYROXINE SODIUM 0.12 MG/1
125 TABLET ORAL
Status: DISCONTINUED | OUTPATIENT
Start: 2017-12-18 | End: 2017-12-18

## 2017-12-17 RX ORDER — SODIUM CHLORIDE, SODIUM LACTATE, POTASSIUM CHLORIDE, CALCIUM CHLORIDE 600; 310; 30; 20 MG/100ML; MG/100ML; MG/100ML; MG/100ML
9 INJECTION, SOLUTION INTRAVENOUS CONTINUOUS PRN
Status: DISCONTINUED | OUTPATIENT
Start: 2017-12-17 | End: 2017-12-17 | Stop reason: HOSPADM

## 2017-12-17 RX ORDER — DEXAMETHASONE SODIUM PHOSPHATE 10 MG/ML
INJECTION INTRAMUSCULAR; INTRAVENOUS AS NEEDED
Status: DISCONTINUED | OUTPATIENT
Start: 2017-12-17 | End: 2017-12-17 | Stop reason: SURG

## 2017-12-17 RX ORDER — PROMETHAZINE HYDROCHLORIDE 25 MG/1
25 TABLET ORAL ONCE AS NEEDED
Status: DISCONTINUED | OUTPATIENT
Start: 2017-12-17 | End: 2017-12-17 | Stop reason: HOSPADM

## 2017-12-17 RX ADMIN — MORPHINE SULFATE 2 MG: 2 INJECTION, SOLUTION INTRAMUSCULAR; INTRAVENOUS at 10:27

## 2017-12-17 RX ADMIN — CLINDAMYCIN PHOSPHATE 900 MG: 18 INJECTION, SOLUTION INTRAVENOUS at 16:57

## 2017-12-17 RX ADMIN — HYDROMORPHONE HYDROCHLORIDE 0.5 MG: 2 INJECTION INTRAMUSCULAR; INTRAVENOUS; SUBCUTANEOUS at 17:49

## 2017-12-17 RX ADMIN — FENTANYL CITRATE 50 MCG: 50 INJECTION INTRAMUSCULAR; INTRAVENOUS at 16:44

## 2017-12-17 RX ADMIN — DEXAMETHASONE SODIUM PHOSPHATE 8 MG: 10 INJECTION INTRAMUSCULAR; INTRAVENOUS at 16:59

## 2017-12-17 RX ADMIN — SODIUM CHLORIDE, POTASSIUM CHLORIDE, SODIUM LACTATE AND CALCIUM CHLORIDE: 600; 310; 30; 20 INJECTION, SOLUTION INTRAVENOUS at 17:48

## 2017-12-17 RX ADMIN — PROPOFOL 200 MG: 10 INJECTION, EMULSION INTRAVENOUS at 16:46

## 2017-12-17 RX ADMIN — SODIUM CHLORIDE, POTASSIUM CHLORIDE, SODIUM LACTATE AND CALCIUM CHLORIDE 9 ML/HR: 600; 310; 30; 20 INJECTION, SOLUTION INTRAVENOUS at 16:00

## 2017-12-17 RX ADMIN — HYDROMORPHONE HYDROCHLORIDE 0.5 MG: 2 INJECTION INTRAMUSCULAR; INTRAVENOUS; SUBCUTANEOUS at 18:07

## 2017-12-17 RX ADMIN — ONDANSETRON 4 MG: 2 INJECTION INTRAMUSCULAR; INTRAVENOUS at 10:28

## 2017-12-17 RX ADMIN — ROCURONIUM BROMIDE 25 MG: 10 INJECTION INTRAVENOUS at 16:50

## 2017-12-17 RX ADMIN — FAMOTIDINE 20 MG: 10 INJECTION, SOLUTION INTRAVENOUS at 16:00

## 2017-12-17 RX ADMIN — Medication 2 MG: at 16:00

## 2017-12-17 RX ADMIN — ONDANSETRON 4 MG: 2 INJECTION INTRAMUSCULAR; INTRAVENOUS at 18:09

## 2017-12-17 RX ADMIN — FENTANYL CITRATE 50 MCG: 50 INJECTION INTRAMUSCULAR; INTRAVENOUS at 21:41

## 2017-12-17 RX ADMIN — ROCURONIUM BROMIDE 5 MG: 10 INJECTION INTRAVENOUS at 16:46

## 2017-12-17 RX ADMIN — HYDROMORPHONE HYDROCHLORIDE 1 MG: 1 INJECTION, SOLUTION INTRAMUSCULAR; INTRAVENOUS; SUBCUTANEOUS at 12:04

## 2017-12-17 RX ADMIN — HYDROMORPHONE HYDROCHLORIDE 0.5 MG: 1 INJECTION, SOLUTION INTRAMUSCULAR; INTRAVENOUS; SUBCUTANEOUS at 21:43

## 2017-12-17 RX ADMIN — KETOROLAC TROMETHAMINE 30 MG: 30 INJECTION, SOLUTION INTRAMUSCULAR; INTRAVENOUS at 20:24

## 2017-12-17 RX ADMIN — FENTANYL CITRATE 50 MCG: 50 INJECTION INTRAMUSCULAR; INTRAVENOUS at 17:09

## 2017-12-17 RX ADMIN — SUCCINYLCHOLINE CHLORIDE 160 MG: 20 INJECTION, SOLUTION INTRAMUSCULAR; INTRAVENOUS; PARENTERAL at 16:46

## 2017-12-17 NOTE — ED PROVIDER NOTES
" EMERGENCY DEPARTMENT ENCOUNTER    CHIEF COMPLAINT  Chief Complaint: head pain  History given by: patient, EMS  History limited by: none  Room Number: 04/04  PMD: Ariel Betancourt MD      HPI:  Pt is a 67 y.o. male who presents via EMS to the ED complaining of head pain and LLE pain s/p fall in the bath tub. Pt reported that his knees \"gave out\" causing him to fall backward and strike his head. EMS reported that the pt has a large hematoma to posterior head and no obvious deformity to LLE but had crepitus to the area. Pt reports associated numbness and tingling in LLE. Denies LOC, CP, SOA, neck pain, and back pain. Pt was unable to stand s/p fall. He has a hx of frequent falls    Duration:  pta  Onset: sudden  Timing: constant  Location: head, LLE  Radiation: none  Quality: 'pain'  Intensity/Severity: moderate  Progression: unchanged  Associated Symptoms: numbness and tingling in LLE  Aggravating Factors: movement  Alleviating Factors: none  Previous Episodes: Pt has hx of frequent falls and bilateral knee replacements  Treatment before arrival: EMS care and intervention.    PAST MEDICAL HISTORY  Active Ambulatory Problems     Diagnosis Date Noted   • History of colon polyps 03/15/2017   • Acute pancreatitis due to calculus of common bile duct 11/07/2017   • Abdominal pain, generalized 11/07/2017     Resolved Ambulatory Problems     Diagnosis Date Noted   • No Resolved Ambulatory Problems     Past Medical History:   Diagnosis Date   • Arthritis    • Colon cancer 2014   • Colon polyps 08/06/2014   • Gout    • Hyperlipemia    • Hypertension    • Hypothyroidism    • Sleep apnea        PAST SURGICAL HISTORY  Past Surgical History:   Procedure Laterality Date   • CHOLECYSTECTOMY WITH INTRAOPERATIVE CHOLANGIOGRAM N/A 11/10/2017    Procedure: CHOLECYSTECTOMY LAPAROSCOPIC INTRAOPERATIVE CHOLANGIOGRAM;  Surgeon: Elian Carnes MD;  Location: [facility];  Service:    • COLECTOMY PARTIAL / TOTAL Right 09/08/2014    Open " right colectomy-Dr. Elian Carnes   • COLONOSCOPY N/A 3/15/2017    Procedure: COLONOSCOPY TO ANASTAMOSIS AND TI WITH COLD SNARE POLYPECTOMY ;  Surgeon: Elian Carnes MD;  Location: [facility] ENDOSCOPY;  Service:    • COLONOSCOPY N/A 08/06/2014    1 cm cecal polyp, 1 cm transverse polyp, 1 cm sigmoid colon polyp, mass in the ascending colon occupying 1/3 of the colonic circumference, mass proximal to the transverse colon occupying 1/4 colonic circumference-Dr. Elian Carnes   • FOOT SURGERY Right 2008, 2009   • GASTRIC BANDING  12/28/2009   • JOINT REPLACEMENT     • NOSE SURGERY     • TOTAL KNEE ARTHROPLASTY Bilateral      1/2002-left knee 6/2002-right knee        FAMILY HISTORY  History reviewed. No pertinent family history.    SOCIAL HISTORY  Social History     Social History   • Marital status: Single     Spouse name: N/A   • Number of children: N/A   • Years of education: N/A     Occupational History   • Not on file.     Social History Main Topics   • Smoking status: Never Smoker   • Smokeless tobacco: Never Used   • Alcohol use No      Comment: socially   • Drug use: No   • Sexual activity: Defer     Other Topics Concern   • Not on file     Social History Narrative   • No narrative on file       ALLERGIES  Keflex [cephalexin]    REVIEW OF SYSTEMS  Review of Systems   Constitutional: Negative for activity change, appetite change and fever.   HENT: Negative for congestion and sore throat.         Head pain secondary to trauma   Eyes: Negative.    Respiratory: Negative for cough and shortness of breath.    Cardiovascular: Negative for chest pain and leg swelling.   Gastrointestinal: Negative for abdominal pain, diarrhea and vomiting.   Endocrine: Negative.    Genitourinary: Negative for decreased urine volume and dysuria.   Musculoskeletal: Positive for myalgias (LLE). Negative for back pain and neck pain.   Skin: Negative for rash and wound.   Allergic/Immunologic: Negative.    Neurological: Negative for  weakness, numbness and headaches.        Denies LOC   Hematological: Negative.    Psychiatric/Behavioral: Negative.    All other systems reviewed and are negative.      PHYSICAL EXAM  ED Triage Vitals   Temp Heart Rate Resp BP SpO2   12/17/17 0953 12/17/17 0953 12/17/17 0953 12/17/17 0953 12/17/17 0953   98 °F (36.7 °C) 72 18 144/78 98 %      Temp src Heart Rate Source Patient Position BP Location FiO2 (%)   12/17/17 0953 -- -- -- --   Tympanic           Physical Exam   Constitutional: He is oriented to person, place, and time and well-developed, well-nourished, and in no distress.   HENT:   Hematoma to posterior   Eyes: EOM are normal. Pupils are equal, round, and reactive to light.   Neck: Normal range of motion. Neck supple. No spinous process tenderness and no muscular tenderness present.   Cardiovascular: Normal rate, regular rhythm, normal heart sounds and intact distal pulses.    Pulmonary/Chest: Effort normal and breath sounds normal. No respiratory distress.   Abdominal: Soft. There is no tenderness. There is no rebound and no guarding.   Musculoskeletal: Normal range of motion. He exhibits no edema.        Cervical back: He exhibits no tenderness.        Thoracic back: He exhibits no tenderness.        Lumbar back: He exhibits no tenderness.        Left lower leg: He exhibits tenderness (lower tibular area) and swelling (lower tibular area).   Pelvis stable.   Neurological: He is alert and oriented to person, place, and time. He has normal sensation and normal strength.   Skin: Skin is warm, dry and intact.   Psychiatric: Mood and affect normal.   Nursing note and vitals reviewed.      LAB RESULTS  Lab Results (last 24 hours)     Procedure Component Value Units Date/Time    CBC & Differential [[id number]] Collected:  12/17/17 1026    Specimen:  Blood Updated:  12/17/17 1254    Narrative:       The following orders were created for panel order CBC & Differential.  Procedure                                Abnormality         Status                     ---------                               -----------         ------                     CBC Auto Differential[[id number]]        Abnormal            Final result                 Please view results for these tests on the individual orders.    Comprehensive Metabolic Panel [[id number]]  (Abnormal) Collected:  12/17/17 1026    Specimen:  Blood Updated:  12/17/17 1250     Glucose 103 (H) mg/dL      BUN 17 mg/dL      Creatinine 1.06 mg/dL      Sodium 142 mmol/L      Potassium 3.4 (L) mmol/L      Chloride 99 mmol/L      CO2 31.6 (H) mmol/L      Calcium 9.8 mg/dL      Total Protein 7.9 g/dL      Albumin 4.10 g/dL      ALT (SGPT) 15 U/L      AST (SGOT) 16 U/L      Alkaline Phosphatase 61 U/L      Total Bilirubin 0.6 mg/dL      eGFR Non African Amer 70 mL/min/1.73      Globulin 3.8 gm/dL      A/G Ratio 1.1 g/dL      BUN/Creatinine Ratio 16.0     Anion Gap 11.4 mmol/L     CBC Auto Differential [[id number]]  (Abnormal) Collected:  12/17/17 1026    Specimen:  Blood Updated:  12/17/17 1254     WBC 4.10 (L) 10*3/mm3      RBC 4.70 10*6/mm3      Hemoglobin 14.5 g/dL      Hematocrit 43.9 %      MCV 93.4 fL      MCH 30.9 pg      MCHC 33.0 g/dL      RDW 13.5 %      RDW-SD 46.1 fl      MPV 10.6 fL      Platelets 138 (L) 10*3/mm3      Neutrophil % 51.2 %      Lymphocyte % 32.0 %      Monocyte % 11.2 %      Eosinophil % 3.9 %      Basophil % 0.7 %      Immature Grans % 1.0 (H) %      Neutrophils, Absolute 2.10 10*3/mm3      Lymphocytes, Absolute 1.31 10*3/mm3      Monocytes, Absolute 0.46 10*3/mm3      Eosinophils, Absolute 0.16 10*3/mm3      Basophils, Absolute 0.03 10*3/mm3      Immature Grans, Absolute 0.04 (H) 10*3/mm3      nRBC 0.0 /100 WBC           I ordered the above labs and reviewed the results    RADIOLOGY  XR Chest 1 View   Final Result   1. Reidentification of chronic lung changes accentuated by body habitus,  portable technique and low lung volumes.  2. Stable negative acute  chest.  3. Follow-up with lateral view would be helpful.   XR Pelvis 1 or 2 View   Final Result   1. No additional acute abnormality, chronic changes of the hips  bilaterally and lower lumbar spine degenerative change.   XR Tibia Fibula 2 View Left   Final Result   1. Spinal fracture of the distal tibial diaphysis and the distal left  fibular diaphyseal metaphyseal junction mild comminution.  2. Avulsion fracture of the navicular anteriorly.  3. 6 mm calcaneal spur.  4. Evidence of patellar dislocation, follow-up lateral imaging of the  knee would be helpful for more accurate assessment and further  evaluation of distal femoral injury.   CT Head Without Contrast   Final Result   1. No evidence of acute intracranial process.       This report was finalized on 12/17/2017 11:18 AM by Dr. Helio Moreau MD.          XR Knee 1 or 2 View Left   1. There is evidence of patellar dislocation suboptimally evaluated due  to positioning, no true lateral distal femoral imaging is available.  2. Lateral dislocation of the patella.  3. Additional imaging of the distal femur will be obtained and addendum  will be dictated.     This report was finalized on 12/17/2017 12:43 PM by Dr. Helio Moreau MD.        I ordered the above noted radiological studies. Interpreted by radiologist. Reviewed by me in PACS.       PROCEDURES  Procedures      PROGRESS AND CONSULTS  ED Course   1005  XR pelvis, XR tibia fibula, CT head ordered. Zofran and morphine ordered for nausea and pain.     1124  Dr. Moreau requested Knee imaging. Call placed to ortho. Call placed to PCP.    1210  BP- 144/78 HR- 72 Temp- 98 °F (36.7 °C) (Tympanic) O2 sat- 98%  Rechecked the patient who is in NAD and is resting comfortably. Poonam Piña PA-C in the room splinting LLE. Discussed imaging showing a fx to tibia and fibula w/ the recommendation for admission. Pt and family denies preference to orthopedic.     1243  BP- 136/78 HR- 72 Temp- 98 °F (36.7 °C)  (Tympanic) O2 sat- 96%  Rechecked the patient who is in NAD and is resting comfortably. Evaluated the pt's left knee. Pt reports he had a total knee replacement. Pt denies knee pain. Pt's patella is mobile under the skin and does not cause pain. Pt able to flex and extend his knee w/o pain. Pt has old surgical scar to left knee. No swelling to the area or post surgical changes.    1245  Consulted with Dr. Betancourt, PCP, who agreed to admit the pt.     1247  Spoke with Dr. Acevedo, ortho, who will consult on the pt.     MEDICAL DECISION MAKING  Results were reviewed/discussed with the patient and they were also made aware of online access. Pt also made aware that some labs, such as cultures, will not be resulted during ER visit and follow up with PMD is necessary.     MDM  Number of Diagnoses or Management Options     Amount and/or Complexity of Data Reviewed  Tests in the radiology section of CPT®: reviewed and ordered (XR tibia fibula-acute fx   XR knee-patella dislocation)  Discussion of test results with the performing providers: yes (Dr. Moreau, radiology)  Discuss the patient with other providers: yes (Dr. Acevedo, orthopedic  Dr. Betancourt, PCP)           DIAGNOSIS  Final diagnoses:   Fall, initial encounter   Fracture tibia/fibula, left, closed, initial encounter   Contusion of scalp, initial encounter   Rule Out Dislocation of left patella, initial encounter       DISPOSITION  ADMISSION    Discussed treatment plan and reason for admission with pt/family and admitting physician.  Pt/family voiced understanding of the plan for admission for further testing/treatment as needed.     Latest Documented Vital Signs:  As of 12:51 PM  BP- 136/78 HR- 72 Temp- 98 °F (36.7 °C) (Tympanic) O2 sat- 96%    --  Documentation assistance provided by amari Banks for Dr. Conklin.  Information recorded by the amari was done at my direction and has been verified and validated by me.       Kasia Banks  12/17/17 3662       Jack  MD Rubin  12/17/17 0593

## 2017-12-17 NOTE — PROGRESS NOTES
Clinical Pharmacy Services: Medication History    Nicanor Haley is a 67 y.o. male presenting to [facility] for Fall, initial encounter [W19.XXXA]    He  has a past medical history of Arthritis; Colon cancer (2014); Colon polyps (08/06/2014); Gout; Hyperlipemia; Hypertension; Hypothyroidism; and Sleep apnea.    Allergies as of 12/17/2017 - Manuel as Reviewed 12/17/2017   Allergen Reaction Noted   • Keflex [cephalexin] Other (See Comments) 04/05/2017       Medication information was obtained from: Patient reported medication list   Pharmacy and Phone Number: [facility] [phone]    Prior to Admission Medications     Prescriptions Last Dose Informant Patient Reported? Taking?    calcium carbonate-cholecalciferol 500-400 MG-UNIT tablet tablet  Self Yes Yes    Take 1 tablet by mouth 2 (Two) Times a Day.    hydrochlorothiazide (HYDRODIURIL) 25 MG tablet  Self Yes Yes    Take 25 mg by mouth Daily.    HYDROmorphone (DILAUDID) 4 MG tablet  Self Yes Yes    Take 4 mg by mouth Every 6 (Six) Hours As Needed for Moderate Pain .    isosorbide mononitrate (IMDUR) 30 MG 24 hr tablet  Self Yes Yes    Take 30 mg by mouth Daily.    levothyroxine (SYNTHROID, LEVOTHROID) 125 MCG tablet  Self Yes Yes    Take 125 mcg by mouth Daily.    metoprolol tartrate (LOPRESSOR) 25 MG tablet  Self Yes Yes    Take 25 mg by mouth 2 (Two) Times a Day. Pt unsure of dose    probenecid (BENEMID) 500 MG tablet  Self Yes Yes    Take 500 mg by mouth 2 (Two) Times a Day.    sertraline (ZOLOFT) 100 MG tablet  Self Yes Yes    Take 100 mg by mouth Daily. Pt unsure of dose    simvastatin (ZOCOR) 40 MG tablet  Self Yes Yes    Take 40 mg by mouth Every Night.    traZODone (DESYREL) 150 MG tablet  Self Yes Yes    Take 150 mg by mouth Every Night.            Medication notes: Patient reports adherence to all current medications       This medication list is complete to the best of my knowledge as of 12/17/2017    Please call if you have any  questions.    Roosevelt Tiwari  Medication History Technician   [id number]  12/17/2017 2:49 PM

## 2017-12-17 NOTE — ED PROVIDER NOTES
FX Dislocation  Date/Time: 12/17/2017 12:05 PM  Performed by: POONAM SOTO  Authorized by: JESSE YANCEY     Consent:     Consent obtained:  Verbal    Consent given by:  Patient  Injury:     Injury location:  Lower leg    Lower leg injury location:  L lower leg  Pre-procedure assessment:     Neurological function: normal      Distal perfusion: normal      Range of motion: reduced    Procedure details:     Immobilization:  Splint    Splint type:  Ankle stirrup (posterior)    Supplies used:  Ortho-Glass  Post-procedure assessment:     Neurological function: normal      Distal perfusion: normal      Range of motion comment:  Not assessed due to fracture    Patient tolerance of procedure:  Tolerated well, no immediate complications      1207  Applied splint at bedside. See above splint application note.        Documentation assistance provided by amari Charlton for Poonam Soto.  Information recorded by the scribe was done at my direction and has been verified and validated by me.       Camila Charlton  12/17/17 1216       Poonam Soto PA-C  12/17/17 1221

## 2017-12-17 NOTE — ANESTHESIA PREPROCEDURE EVALUATION
Anesthesia Evaluation     no history of anesthetic complications:         Airway   Mallampati: III  possible difficult intubation  Dental      Comment: Missing 1 upper & 1 lower anterior tooth    Pulmonary - normal exam   (+) sleep apnea on CPAP,   (-) COPD, asthma, not a smoker    PE comment: nonlabored  Cardiovascular - normal exam    Rhythm: regular  Rate: normal    (+) hypertension well controlled, hyperlipidemia  (-) valvular problems/murmurs, past MI, CAD, dysrhythmias, angina      Neuro/Psych- negative ROS  (-) seizures, TIA, CVA  GI/Hepatic/Renal/Endo    (+) morbid obesity, hypothyroidism,   (-) GERD, liver disease, diabetes, hyperthyroidism    Musculoskeletal     (+) arthralgias,       ROS comment: L Tib/Fib Fx due to fall;  Abdominal    Substance History      OB/GYN          Other   (+) arthritis   history of cancer (colon CA)                                    Anesthesia Plan    ASA 3     general   (No block PSR)  intravenous induction   Anesthetic plan and risks discussed with patient.

## 2017-12-17 NOTE — ED NOTES
Per Dr. Conklin, pt is to have lower extremity CT scan prior to transport to inpatient floor     Poonam Cr RN  12/17/17 [id number]

## 2017-12-17 NOTE — ED NOTES
Pt fell in bathroom after knees gave out. Hit head on bathtub. Denies LOC.  Pt unable to stand after fall.  Pt in c collar and left leg in splint per EMS.       Amira Connelly RN  12/17/17 0959       Amira Connelly RN  12/17/17 1000

## 2017-12-17 NOTE — CONSULTS
Ortho (on-call MD unless specified)  Consult performed by: MAYELA FAUSTIN JR  Consult ordered by: JESSE YANCEY          Patient Care Team:  Ariel Betancourt MD as PCP - General  Ariel Betancourt MD as PCP - Family Medicine    Chief complaint: left leg pain    Subjective     History of Present Illness    Patient is a 66 y/o male with morbid obesity, HTN, HLD presenting with left lower extremity deformity and pain following a mechanical fall today.  He also struck his head on the bathtub.  He was brought to the [facility] ER where radiographs confirmed a displaced left tibia and fibula fracture.  CT of the head was negative for acute intracranial process.  He was admitted to the medicine service.  Orthopaedics was consulted for surgical management of his lower extremity injury.    He is s/p bilateral TKA in the early 2000s.  He denies any knee pain.    He currently denies CP, SOB, fevers, chills, nausea, vomiting, dizziness.    He is on no blood thinners.    This injury was reported as closed by the ER staff.     Review of Systems   Review of Systems:  Constitutional: Negative  HENT: Negative  Eyes: Negative  Respiratory: Negative; no shortness of breath  Cardiovascular: Negative; no current chest pain  Gastrointestinal: Negative  : Negative  Skin: Negative except as listed in HPI  Neurological: Negative, no numbness or deficits  Hematological: Negative  Muscoloskeletal: Per HPI    Past Medical History:   Diagnosis Date   • Arthritis    • Colon cancer 2014    tubulovillous adenomas with adenocarcinoma in situ s/p right open colectomy   • Colon polyps 08/06/2014    1) cecal polyps: fragments of adenomatous polyp with low grade-dysplasia 2) ascending colon mass bx: fragments of tubulovillous adenoma w/ low grade dysplasia 3) transverse colon polyp: fragments of adenomatous polyp with low grade dysplasia 4) sigmoid polyp: fragments of adenomatous polyp with low grade dysplasia   • Gout    • Hyperlipemia    • Hypertension    •  Hypothyroidism    • Sleep apnea    ,   Past Surgical History:   Procedure Laterality Date   • CHOLECYSTECTOMY     • CHOLECYSTECTOMY WITH INTRAOPERATIVE CHOLANGIOGRAM N/A 11/10/2017    Procedure: CHOLECYSTECTOMY LAPAROSCOPIC INTRAOPERATIVE CHOLANGIOGRAM;  Surgeon: Elian Carnes MD;  Location: [facility] MAIN OR;  Service:    • COLECTOMY PARTIAL / TOTAL Right 09/08/2014    Open right colectomy-Dr. Elian Carnes   • COLONOSCOPY N/A 3/15/2017    Procedure: COLONOSCOPY TO ANASTAMOSIS AND TI WITH COLD SNARE POLYPECTOMY ;  Surgeon: Elian Carnes MD;  Location: [facility] ENDOSCOPY;  Service:    • COLONOSCOPY N/A 08/06/2014    1 cm cecal polyp, 1 cm transverse polyp, 1 cm sigmoid colon polyp, mass in the ascending colon occupying 1/3 of the colonic circumference, mass proximal to the transverse colon occupying 1/4 colonic circumference-Dr. Elian Carnes   • FOOT SURGERY Right 2008, 2009   • GASTRIC BANDING  12/28/2009   • JOINT REPLACEMENT     • NOSE SURGERY     • TOTAL KNEE ARTHROPLASTY Bilateral      1/2002-left knee 6/2002-right knee    , History reviewed. No pertinent family history.,   Social History   Substance Use Topics   • Smoking status: Never Smoker   • Smokeless tobacco: Never Used   • Alcohol use No      Comment: socially   ,   (Not in a hospital admission), Scheduled Meds:   , Continuous Infusions:   , PRN Meds:  •  ondansetron  •  Insert peripheral IV **AND** sodium chloride and Allergies:  Keflex [cephalexin]    Objective      Vital Signs  Temp:  [98 °F (36.7 °C)] 98 °F (36.7 °C)  Heart Rate:  [72] 72  Resp:  [18] 18  BP: (136-144)/(78) 136/78    Physical Exam  Physical Exam:  Vital signs reviewed.  Constitutional:  Morbid obesity  HEENT:  Head: normocephalic, atraumatic  Eyes: EOMI, grossly normal.  No scleral icterus.  Neck: Normal range of motion.  Supple, no tracheal deviation.  Cardiovascular: Regular rate.  Pulmonary: Effort normal, symmetric chest expansion, no labored breathing.  Abdominal: Soft,  non distended  Neurological: No gross deficits, oriented to person, place, and time.  Skin: Warm and dry  Psychiatric: Normal mood and affect  Musculoskeletal:    LLE: well healed anterior midline incision over knee.  No pain with palpation of patella, patella tracks well.  No tenderness or ecchymosis around knee.    Short leg splint in place, clean and dry.  Toes warm, perfused, BCR.  Flexes/extends toes.  SILT over toes.  No pain with passive stretch.      Results Review:   Radiographs and CT of the left lower extremity are independently reviewed.  They demonstrate a TKA in place without evidence of component failure/dislocation/periprosthetic fracture.  Poor rotational films earlier suggested possible patellar dislocation; radiology agrees patella remains located on better lateral of the knee; this is confirmed by CT.    There is a short oblique fracture of the distal third of the tibial diaphysis.  Mild varus angulation.  There is a comminuted displaced fracture of the distal fibula.  Minimally displaced posterior malleolus fracture identified on CT.        Assessment/Plan     Active Problems:    Fall      Assessment & Plan        Patient is a 68 y/o male with morbid obesity, HTN, HLD presenting with a closed left distal 1/3 tibia shaft fracture and associated bimalleolar ankle fracture.      I have recommended surgical stabilization of his injuries.  Given his TKA, intramedullary nailing of the tibia fracture will not be possible and thus would proceed with ORIF/plating of the tibia and ankle fractures.    The risks/benefits of surgery, including pain, infection, wound healing problems, compartment syndrome, need for future procedures, injury to neurovascular and/or tendinous structures, mal/nonunion, DVT/PE, cardiac event, and/or death were discussed, and the patient elected to proceed with surgery.    He will be at increased risk of wound healing problems and/or infection perioperatively given his morbid  obesity.    - Plan for ORIF left tibia/fibula  - Please continue NPO  - DVT: Will start Lovenox postop. RONNIE/SCDs for now  - Pain control  - NWB LLE    Thank you for this consultation.  Lai Acevedo Jr, MD  12/17/17  2:39 PM

## 2017-12-17 NOTE — ANESTHESIA PROCEDURE NOTES
Airway  Urgency: elective      General Information and Staff    Patient location during procedure: OR  Anesthesiologist: ELLIS HAWK    Indications and Patient Condition    Preoxygenated: yes      Final Airway Details  Final airway type: endotracheal airway      Successful airway: ETT  Cuffed: yes   Successful intubation technique: direct laryngoscopy  Endotracheal tube insertion site: oral  Blade: Олег  Blade size: #3  ETT size: 7.5 mm  Cormack-Lehane Classification: grade IIa - partial view of glottis  Placement verified by: chest auscultation   Number of attempts at approach: 1    Additional Comments  On stretcher

## 2017-12-18 PROBLEM — S82.202A CLOSED LEFT TIBIAL FRACTURE: Status: ACTIVE | Noted: 2017-12-18

## 2017-12-18 PROBLEM — S82.842A CLOSED BIMALLEOLAR FRACTURE OF LEFT ANKLE: Status: ACTIVE | Noted: 2017-12-18
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
## 2017-12-18 PROCEDURE — 94799 UNLISTED PULMONARY SVC/PX: CPT

## 2017-12-18 PROCEDURE — 97110 THERAPEUTIC EXERCISES: CPT

## 2017-12-18 PROCEDURE — 25010000002 ENOXAPARIN PER 10 MG: Performed by: ORTHOPAEDIC SURGERY

## 2017-12-18 PROCEDURE — 25010000002 HYDROMORPHONE PER 4 MG: Performed by: ORTHOPAEDIC SURGERY

## 2017-12-18 PROCEDURE — 97162 PT EVAL MOD COMPLEX 30 MIN: CPT

## 2017-12-18 RX ORDER — PROBENECID 500 MG/1
500 TABLET, FILM COATED ORAL 2 TIMES DAILY
Status: DISCONTINUED | OUTPATIENT
Start: 2017-12-18 | End: 2017-12-23 | Stop reason: HOSPADM

## 2017-12-18 RX ORDER — LEVOTHYROXINE SODIUM 0.12 MG/1
125 TABLET ORAL
Status: DISCONTINUED | OUTPATIENT
Start: 2017-12-19 | End: 2017-12-23 | Stop reason: HOSPADM

## 2017-12-18 RX ORDER — ISOSORBIDE MONONITRATE 30 MG/1
30 TABLET, EXTENDED RELEASE ORAL DAILY
Status: DISCONTINUED | OUTPATIENT
Start: 2017-12-19 | End: 2017-12-23 | Stop reason: HOSPADM

## 2017-12-18 RX ORDER — SERTRALINE HYDROCHLORIDE 100 MG/1
100 TABLET, FILM COATED ORAL DAILY
Status: DISCONTINUED | OUTPATIENT
Start: 2017-12-19 | End: 2017-12-23 | Stop reason: HOSPADM

## 2017-12-18 RX ORDER — TRAZODONE HYDROCHLORIDE 50 MG/1
150 TABLET ORAL NIGHTLY
Status: DISCONTINUED | OUTPATIENT
Start: 2017-12-18 | End: 2017-12-23 | Stop reason: HOSPADM

## 2017-12-18 RX ADMIN — HYDROMORPHONE HYDROCHLORIDE 0.5 MG: 1 INJECTION, SOLUTION INTRAMUSCULAR; INTRAVENOUS; SUBCUTANEOUS at 22:53

## 2017-12-18 RX ADMIN — ATORVASTATIN CALCIUM 10 MG: 10 TABLET, FILM COATED ORAL at 08:52

## 2017-12-18 RX ADMIN — CLINDAMYCIN PHOSPHATE 600 MG: 12 INJECTION, SOLUTION INTRAMUSCULAR; INTRAVENOUS at 16:01

## 2017-12-18 RX ADMIN — CLINDAMYCIN PHOSPHATE 600 MG: 12 INJECTION, SOLUTION INTRAMUSCULAR; INTRAVENOUS at 08:52

## 2017-12-18 RX ADMIN — HYDROMORPHONE HYDROCHLORIDE 4 MG: 2 TABLET ORAL at 17:44

## 2017-12-18 RX ADMIN — LEVOTHYROXINE SODIUM 125 MCG: 125 TABLET ORAL at 05:04

## 2017-12-18 RX ADMIN — HYDROMORPHONE HYDROCHLORIDE 0.5 MG: 1 INJECTION, SOLUTION INTRAMUSCULAR; INTRAVENOUS; SUBCUTANEOUS at 08:51

## 2017-12-18 RX ADMIN — TRAZODONE HYDROCHLORIDE 150 MG: 150 TABLET, FILM COATED ORAL at 03:51

## 2017-12-18 RX ADMIN — HYDROMORPHONE HYDROCHLORIDE 0.5 MG: 1 INJECTION, SOLUTION INTRAMUSCULAR; INTRAVENOUS; SUBCUTANEOUS at 00:07

## 2017-12-18 RX ADMIN — HYDROMORPHONE HYDROCHLORIDE 0.5 MG: 1 INJECTION, SOLUTION INTRAMUSCULAR; INTRAVENOUS; SUBCUTANEOUS at 11:00

## 2017-12-18 RX ADMIN — HYDROMORPHONE HYDROCHLORIDE 0.5 MG: 1 INJECTION, SOLUTION INTRAMUSCULAR; INTRAVENOUS; SUBCUTANEOUS at 16:01

## 2017-12-18 RX ADMIN — HYDROMORPHONE HYDROCHLORIDE 0.5 MG: 1 INJECTION, SOLUTION INTRAMUSCULAR; INTRAVENOUS; SUBCUTANEOUS at 14:02

## 2017-12-18 RX ADMIN — PROBENECID 500 MG: 500 TABLET, FILM COATED ORAL at 20:40

## 2017-12-18 RX ADMIN — HYDROMORPHONE HYDROCHLORIDE 0.5 MG: 1 INJECTION, SOLUTION INTRAMUSCULAR; INTRAVENOUS; SUBCUTANEOUS at 05:22

## 2017-12-18 RX ADMIN — ENOXAPARIN SODIUM 40 MG: 40 INJECTION SUBCUTANEOUS at 08:51

## 2017-12-18 RX ADMIN — CLINDAMYCIN PHOSPHATE 600 MG: 12 INJECTION, SOLUTION INTRAMUSCULAR; INTRAVENOUS at 02:17

## 2017-12-18 RX ADMIN — TRAZODONE HYDROCHLORIDE 150 MG: 150 TABLET, FILM COATED ORAL at 20:37

## 2017-12-18 RX ADMIN — HYDROMORPHONE HYDROCHLORIDE 4 MG: 2 TABLET ORAL at 20:37

## 2017-12-18 RX ADMIN — Medication 25 MG: at 20:37

## 2017-12-18 RX ADMIN — ISOSORBIDE MONONITRATE 30 MG: 30 TABLET ORAL at 08:52

## 2017-12-18 RX ADMIN — HYDROMORPHONE HYDROCHLORIDE 0.5 MG: 1 INJECTION, SOLUTION INTRAMUSCULAR; INTRAVENOUS; SUBCUTANEOUS at 02:40

## 2017-12-18 NOTE — PROGRESS NOTES
"Orthopaedic Foot and Ankle Surgery  Daily Progress Note    /64 (BP Location: Left arm, Patient Position: Lying)  Pulse 84  Temp 98.7 °F (37.1 °C) (Oral)   Resp 18  Ht 175.3 cm (69\")  Wt (!) 145 kg (319 lb)  SpO2 92%  BMI 47.11 kg/m2        SUBJECTIVE  Reports pain controlled    PHYSICAL EXAM  Sleeping in NAD, awakens easily with exam  Splint clean, dry, intact  Toes warm, perfused, brisk capillary refill  Flexes/extends toes  SILT over toes  No pain with passive stretch     Active Problems:    Fall      PLAN / DISPOSITION:  POD 1 s/p ORIF left tibia/fibula, doing well    1. Pain control: On significant chronic opiates at baseline; pain currently appears under control at current regimen, dilaudid 4-6mg po Q3H.    2.  Antibiotics: Periop Clinda to end today  3.  PT: Strict NWB, LLE  4. DVT: Lovenox 40mg plus RONNIE/SCD, mobilization  5. Dispo: pending, follow up 2-3 weeks with me at [facility] ([phone] to make appointment)    Lai Acevedo Jr, MD  12/18/17  6:57 AM  "

## 2017-12-18 NOTE — THERAPY EVALUATION
Acute Care - Physical Therapy Initial Evaluation  [facility]     Patient Name: Nicanor Haley  : 1950  MRN: [id number]  Today's Date: 2017   Onset of Illness/Injury or Date of Surgery Date: 17  Date of Referral to PT: 17  Referring Physician: Lai Muro      Admit Date: 2017     Visit Dx:    ICD-10-CM ICD-9-CM   1. Fall, initial encounter W19.XXXA E888.9   2. Fracture tibia/fibula, left, closed, initial encounter S82.202A 823.82    S82.402A    3. Contusion of scalp, initial encounter S00.03XA 920   4. Rule Out Dislocation of left patella, initial encounter S83.005A 836.3   5. Muscle weakness (generalized) M62.81 728.87     Patient Active Problem List   Diagnosis   • History of colon polyps   • Acute pancreatitis due to calculus of common bile duct   • Abdominal pain, generalized   • Fall     Past Medical History:   Diagnosis Date   • Arthritis    • Colon cancer     tubulovillous adenomas with adenocarcinoma in situ s/p right open colectomy   • Colon polyps 2014    1) cecal polyps: fragments of adenomatous polyp with low grade-dysplasia 2) ascending colon mass bx: fragments of tubulovillous adenoma w/ low grade dysplasia 3) transverse colon polyp: fragments of adenomatous polyp with low grade dysplasia 4) sigmoid polyp: fragments of adenomatous polyp with low grade dysplasia   • Gout    • Hyperlipemia    • Hypertension    • Hypothyroidism    • Sleep apnea      Past Surgical History:   Procedure Laterality Date   • CHOLECYSTECTOMY     • CHOLECYSTECTOMY WITH INTRAOPERATIVE CHOLANGIOGRAM N/A 11/10/2017    Procedure: CHOLECYSTECTOMY LAPAROSCOPIC INTRAOPERATIVE CHOLANGIOGRAM;  Surgeon: Elian Carnes MD;  Location: [facility] OR;  Service:    • COLECTOMY PARTIAL / TOTAL Right 2014    Open right colectomy-Dr. Elian Carnes   • COLONOSCOPY N/A 3/15/2017    Procedure: COLONOSCOPY TO ANASTAMOSIS AND TI WITH COLD SNARE POLYPECTOMY ;  Surgeon: Elian Carnes MD;   "Location: [facility] ENDOSCOPY;  Service:    • COLONOSCOPY N/A 08/06/2014    1 cm cecal polyp, 1 cm transverse polyp, 1 cm sigmoid colon polyp, mass in the ascending colon occupying 1/3 of the colonic circumference, mass proximal to the transverse colon occupying 1/4 colonic circumference-Dr. Elian Carnes   • FOOT SURGERY Right 2008, 2009   • GASTRIC BANDING  12/28/2009   • JOINT REPLACEMENT     • NOSE SURGERY     • TOTAL KNEE ARTHROPLASTY Bilateral      1/2002-left knee 6/2002-right knee           PT ASSESSMENT (last 72 hours)      PT Evaluation       12/18/17 1047 12/17/17 2327    Rehab Evaluation    Document Type evaluation  -MS     Subjective Information agree to therapy;complains of;weakness;fatigue;pain  -MS     Patient Effort, Rehab Treatment adequate  -MS     Symptoms Noted Comment Pt. reports \"sore\" all over his body this AM with no specific pain in his Left lower L.E., even with movement.  Pt. reports that his Right L.E. is \"very bad and weak\" as well from h/o multiple knee surgeries.  -MS     General Information    Onset of Illness/Injury or Date of Surgery Date 12/17/17  -MS     Referring Physician Lai Muro  -MS     General Observations Left lower L.E./ankle and foot in splint;  blood saturation on ace bandage near pt.'s heel (nursing was immediately notified of this).   -MS     Pertinent History Of Current Problem Pt. s/p Left Tibia/fibula ORIF  -MS     Precautions/Limitations fall precautions   NWB Left L.E.; Exit alarm  -MS     Equipment Currently Used at Home walker, rolling   Pt. reports he has been on a RWX \"for years\" now.  -MS     Plans/Goals Discussed With patient;agreed upon  -MS     Risks Reviewed patient:  -MS     Benefits Reviewed patient:  -MS     Barriers to Rehab none identified  -MS     Living Environment    Lives With  spouse  -MI    Living Arrangements  house  -MI    Home Accessibility  no concerns  -MI    Clinical Impression    Date of Referral to PT 12/17/17  -MS     Criteria " "for Skilled Therapeutic Interventions Met treatment indicated  -MS     Rehab Potential good, to achieve stated therapy goals  -MS     Pain Assessment    Pain Assessment No/denies pain   No specific pain, just reports \"sore\" all over.  -MS     Cognitive Assessment/Intervention    Current Cognitive/Communication Assessment functional  -MS     Orientation Status oriented x 4  -MS     Follows Commands/Answers Questions 100% of the time  -MS     Personal Safety WNL/WFL  -MS     Personal Safety Interventions fall prevention program maintained;gait belt;nonskid shoes/slippers when out of bed;supervised activity  -MS     ROM (Range of Motion)    General ROM --   B Shldr's (Imp. 25%); RLE (WFL's)  -MS     MMT (Manual Muscle Testing)    General MMT Assessment --   B Shldr's (3-/5); RLE (3+/5)  -MS     Mobility Assessment/Training    Extremity Weight-Bearing Status left lower extremity  -MS     Left Lower Extremity Weight-Bearing non weight-bearing  -MS     Bed Mobility, Assessment/Treatment    Bed Mobility, Assistive Device bed rails;draw sheet  -MS     Bed Mob, Supine to Sit, Swisher moderate assist (50% patient effort)  -MS     Bed Mob, Sit to Supine, Swisher moderate assist (50% patient effort)  -MS     Bed Mobility, Safety Issues decreased use of legs for bridging/pushing  -MS     Transfer Assessment/Treatment    Transfer, Impairments unable to maintain weight bearing status  -MS     Transfer, Comment Attempted x 2 to stand at bedside but pt. barely able to clear his bottom off of the bed given his NWB Left L.E. limitation.  Pt. was only able to scoot his bottom up toward the head of the bed while sitting on bed with assist.   -MS     Gait Assessment/Treatment    Gait, Swisher Level not appropriate to assess;unable to perform  -MS     Gait, Impairments unable to maintain weight bearing status  -MS     Balance Skills Training    Sitting-Level of Assistance Close supervision  -MS     Sitting-Balance Support " Right upper extremity supported;Left upper extremity supported  -MS     Therapy Exercises    Right Lower Extremity AROM:;10 reps;sitting;ankle pumps/circles;hip flexion;LAQ  -MS     Bilateral Upper Extremity AROM:;10 reps;sitting;elbow flexion/extension;shoulder protraction/retraction;shoulder rolls/shrugs  -MS     Positioning and Restraints    Pre-Treatment Position in bed  -MS     Post Treatment Position bed  -MS     In Bed notified nsg;supine;call light within reach;encouraged to call for assist;exit alarm on;LLE elevated   All lines intact. Nurse (Zayra) notified how pt. did with PT  -MS       User Key  (r) = Recorded By, (t) = Taken By, (c) = Cosigned By    Initials Name Provider Type    MS Elvis Tejeda, PT Physical Therapist    KERRY Herrera RN Registered Nurse          Physical Therapy Education     Title: PT OT SLP Therapies (Done)     Topic: Physical Therapy (Done)     Point: Mobility training (Done)    Learning Progress Summary    Learner Readiness Method Response Comment Documented by Status   Patient Acceptance EMERITA TAN NR  MS 12/18/17 1056 Done               Point: Home exercise program (Done)    Learning Progress Summary    Learner Readiness Method Response Comment Documented by Status   Patient Acceptance EMERITA TAN,NR  MS 12/18/17 1056 Done               Point: Body mechanics (Done)    Learning Progress Summary    Learner Readiness Method Response Comment Documented by Status   Patient Acceptance EMERITA TANNR  MS 12/18/17 1056 Done               Point: Precautions (Done)    Learning Progress Summary    Learner Readiness Method Response Comment Documented by Status   Patient Acceptance EMERITA TANNR  MS 12/18/17 1056 Done                      User Key     Initials Effective Dates Name Provider Type Discipline    MS 12/01/15 -  Elvis Tejeda, PT Physical Therapist PT                PT Recommendation and Plan  Anticipated Discharge Disposition: skilled nursing facility  Planned Therapy Interventions:  balance training, bed mobility training, gait training, home exercise program, patient/family education, postural re-education, strengthening, transfer training  PT Frequency: daily  Plan of Care Review  Plan Of Care Reviewed With: patient  Outcome Summary/Follow up Plan: Pt. will benefit from skilled inpt. P.T. to address his functional deficits and to assist pt. in regaining his maximum level of independence with functional mobility.  P.T. rec.'s SNF placement given pt.'s NWB status and prior h/o of mobility impairments.          IP PT Goals       12/18/17 1056          Bed Mobility PT LTG    Bed Mobility PT LTG, Date Established 12/18/17  -MS      Bed Mobility PT LTG, Time to Achieve 5 - 7 days  -MS      Bed Mobility PT LTG, Activity Type all bed mobility  -MS      Bed Mobility PT LTG, [place] Level contact guard assist  -MS      Transfer Training PT LTG    Transfer Training PT LTG, Date Established 12/18/17  -MS      Transfer Training PT LTG, Time to Achieve 5 - 7 days  -MS      Transfer Training PT LTG, Activity Type sit to stand/stand to sit  -MS      Transfer Training PT LTG, [place] Level minimum assist (75% patient effort);2 person assist required  -MS      Transfer Training PT LTG, Assist Device walker, rolling  -MS      Transfer Training PT LTG, Additional Goal Maintaining NWB Left L.E.  -MS      Transfer Training 2 PT LTG    Transfer Training PT 2 LTG, Date Established 12/18/17  -MS      Transfer Training PT 2 LTG, Time to Achieve 5 - 7 days  -MS      Transfer Training PT 2 LTG, Activity Type bed to chair /chair to bed  -MS      Transfer Training PT 2 LTG, [place] Level minimum assist (75% patient effort);2 person assist required  -MS      Transfer Training PT 2 LTG, Assist Device walker, rolling  -MS      Transfer Training PT 2 LTG, Additional Goal Maintaining NWB Left L.E.  -MS        User Key  (r) = Recorded By, (t) = Taken By, (c) = Cosigned By    Initials Name Provider Type    MS  Elvis Tejeda, PT Physical Therapist                Outcome Measures       12/18/17 1000          How much help from another person do you currently need...    Turning from your back to your side while in flat bed without using bedrails? 3  -MS      Moving from lying on back to sitting on the side of a flat bed without bedrails? 2  -MS      Moving to and from a bed to a chair (including a wheelchair)? 1  -MS      Standing up from a chair using your arms (e.g., wheelchair, bedside chair)? 1  -MS      Climbing 3-5 steps with a railing? 1  -MS      To walk in hospital room? 1  -MS      AM-PAC 6 Clicks Score 9  -MS      Functional Assessment    Outcome Measure Options AM-PAC 6 Clicks Basic Mobility (PT)  -MS        User Key  (r) = Recorded By, (t) = Taken By, (c) = Cosigned By    Initials Name Provider Type    MS Elvis Tejeda PT Physical Therapist           Time Calculation:         PT Charges       12/18/17 1059          Time Calculation    Start Time 1025  -MS      Stop Time 1045  -MS      Time Calculation (min) 20 min  -MS      PT Received On 12/18/17  -MS      PT - Next Appointment 12/19/17  -MS      PT Goal Re-Cert Due Date 12/25/17  -MS        User Key  (r) = Recorded By, (t) = Taken By, (c) = Cosigned By    Initials Name Provider Type    MS Elvis Tejeda, PT Physical Therapist          Therapy Charges for Today     Code Description Service Date Service Provider Modifiers Qty    71736411396 HC PT EVAL MOD COMPLEXITY 2 12/18/2017 Elvis Tejeda, PT GP 1    48245311424 HC PT THER PROC EA 15 MIN 12/18/2017 Elvis Tejeda, PT GP 1          PT G-Codes  Outcome Measure Options: AM-PAC 6 Clicks Basic Mobility (PT)      Elvis Tejeda PT  12/18/2017

## 2017-12-18 NOTE — H&P
"History and Physical  Ariel Betancourt MD    Name: Nicanor Haley ADMIT: 2017   : 1950  PCP: Ariel Betancourt MD    MRN: [id number] LOS: 1 days   AGE/SEX: 67 y.o. male  ROOM: P886/1     Chief Complaint   Patient presents with   • Leg Pain     fall, left lower leg pain       Subjective   Patient is a 67 y.o. male presents with the following...    History of Present Illness  Pt is a 67 y.o. male who presents via EMS to the ED complaining of head pain and LLE pain s/p fall in the bath tub. Pt reported that his knees \"gave out\" causing him to fall backward and strike his head. EMS reported that the pt has a large hematoma to posterior head and no obvious deformity to LLE but had crepitus to the area. Pt reports associated numbness and tingling in LLE. Denies LOC, CP, SOA, neck pain, and back pain. Pt was unable to stand s/p fall. He has a hx of frequent falls       LEFT TIBIA AND FIBULA 4 VIEWS      HISTORY: Posterior medical and pain      COMPARISON: None available      FINDINGS:  1. Spinal fracture of the distal tibial diaphysis and the distal left  fibular diaphyseal metaphyseal junction mild comminution.  2. Avulsion fracture of the navicular anteriorly.  3. 6 mm calcaneal spur.  4. Evidence of patellar dislocation, follow-up lateral imaging of the  knee would be helpful for more accurate assessment and further  evaluation of distal femoral injury.    Orthopedic surgery on ER call consulted.Dr Acevedo.  The patient scheduled for urgent surgery.          Past Medical History:   Diagnosis Date   • Arthritis    • Colon cancer     tubulovillous adenomas with adenocarcinoma in situ s/p right open colectomy   • Colon polyps 2014    1) cecal polyps: fragments of adenomatous polyp with low grade-dysplasia 2) ascending colon mass bx: fragments of tubulovillous adenoma w/ low grade dysplasia 3) transverse colon polyp: fragments of adenomatous polyp with low grade dysplasia 4) sigmoid polyp: fragments of " adenomatous polyp with low grade dysplasia   • Gout    • Hyperlipemia    • Hypertension    • Hypothyroidism    • Sleep apnea      Past Surgical History:   Procedure Laterality Date   • CHOLECYSTECTOMY     • CHOLECYSTECTOMY WITH INTRAOPERATIVE CHOLANGIOGRAM N/A 11/10/2017    Procedure: CHOLECYSTECTOMY LAPAROSCOPIC INTRAOPERATIVE CHOLANGIOGRAM;  Surgeon: Elian Carnes MD;  Location: [facility];  Service:    • COLECTOMY PARTIAL / TOTAL Right 09/08/2014    Open right colectomy-Dr. Elian Carnes   • COLONOSCOPY N/A 3/15/2017    Procedure: COLONOSCOPY TO ANASTAMOSIS AND TI WITH COLD SNARE POLYPECTOMY ;  Surgeon: Elian Carnes MD;  Location: [facility] ENDOSCOPY;  Service:    • COLONOSCOPY N/A 08/06/2014    1 cm cecal polyp, 1 cm transverse polyp, 1 cm sigmoid colon polyp, mass in the ascending colon occupying 1/3 of the colonic circumference, mass proximal to the transverse colon occupying 1/4 colonic circumference-Dr. Elian Carnes   • FOOT SURGERY Right 2008, 2009   • GASTRIC BANDING  12/28/2009   • JOINT REPLACEMENT     • NOSE SURGERY     • ORIF TIBIA/FIBULA FRACTURES Left 12/17/2017    Procedure: TIBIA/FIBULA OPEN REDUCTION INTERNAL FIXATION;  Surgeon: Lai Acevedo Jr., MD;  Location: [facility];  Service:    • TOTAL KNEE ARTHROPLASTY Bilateral      1/2002-left knee 6/2002-right knee      History reviewed. No pertinent family history.  Social History   Substance Use Topics   • Smoking status: Never Smoker   • Smokeless tobacco: Never Used   • Alcohol use No      Comment: socially     Prescriptions Prior to Admission   Medication Sig Dispense Refill Last Dose   • calcium carbonate-cholecalciferol 500-400 MG-UNIT tablet tablet Take 1 tablet by mouth 2 (Two) Times a Day.   12/17/2017 at Unknown time   • hydrochlorothiazide (HYDRODIURIL) 25 MG tablet Take 25 mg by mouth Daily.   12/17/2017 at Unknown time   • HYDROmorphone (DILAUDID) 4 MG tablet Take 4 mg by mouth Every 6 (Six) Hours As Needed for Moderate  Pain .   12/17/2017 at Unknown time   • isosorbide mononitrate (IMDUR) 30 MG 24 hr tablet Take 30 mg by mouth Daily.   12/17/2017 at Unknown time   • levothyroxine (SYNTHROID, LEVOTHROID) 125 MCG tablet Take 250 mcg by mouth Daily.   12/17/2017 at Unknown time   • metoprolol tartrate (LOPRESSOR) 25 MG tablet Take 25 mg by mouth 2 (Two) Times a Day. Pt unsure of dose   12/17/2017 at Unknown time   • probenecid (BENEMID) 500 MG tablet Take 500 mg by mouth 2 (Two) Times a Day.   12/17/2017 at Unknown time   • sertraline (ZOLOFT) 100 MG tablet Take 100 mg by mouth Daily. Pt unsure of dose   12/17/2017 at Unknown time   • simvastatin (ZOCOR) 40 MG tablet Take 40 mg by mouth Daily.   12/17/2017 at Unknown time   • traZODone (DESYREL) 150 MG tablet Take 150 mg by mouth Every Night.   12/16/2017 at Unknown time     Allergies:  Keflex [cephalexin]    Review of Systems     Reviewed all 14 of the body systems  Abnormals per HPI    Objective    Vital Signs  Temp:  [97 °F (36.1 °C)-100.2 °F (37.9 °C)] 98.4 °F (36.9 °C)  Heart Rate:  [69-98] 96  Resp:  [16-18] 18  BP: (104-149)/(58-82) 117/58  SpO2:  [92 %-99 %] 95 %  on  Flow (L/min):  [3-4] 3;   O2 Device: CPAP  Body mass index is 47.11 kg/(m^2).    Physical Exam    Constitutional: He is oriented to person, place, and time and well-developed, well-nourished, and in no distress.   HENT:   Hematoma to posterior   Eyes: EOM are normal. Pupils are equal, round, and reactive to light.   Neck: Normal range of motion. Neck supple. No spinous process tenderness and no muscular tenderness present.   Cardiovascular: Normal rate, regular rhythm, normal heart sounds and intact distal pulses.    Pulmonary/Chest: Effort normal and breath sounds normal. No respiratory distress.   Abdominal: Soft. There is no tenderness. There is no rebound and no guarding.   Musculoskeletal: Normal range of motion. He exhibits no edema.        Cervical back: He exhibits no tenderness.        Thoracic back: He  exhibits no tenderness.        Lumbar back: He exhibits no tenderness.        Left lower leg: He exhibits tenderness (lower tibular area) and swelling (lower tibular area).   Pelvis stable.   Neurological: He is alert and oriented to person, place, and time. He has normal sensation and normal strength.   Skin: Skin is warm, dry and intact.   Psychiatric: Mood and affect normal.   Nursing note and vitals reviewed.       Results Review:   I reviewed the patient's new clinical results.    Assessment/Plan     Active Problems:    Fall      Assessment & Plan    1. Closed left oblique tibial shaft fracture  2.  Closed left bimalleolar ankle fracture  3) Hypokalemia  4) HTN  5) PostPolio Syndrome    I discussed the patients findings and my recommendations with patient and nursing staff.          Ariel Betancourt MD  12/18/17  5:24 PM

## 2017-12-18 NOTE — PLAN OF CARE
Problem: Patient Care Overview (Adult)  Goal: Plan of Care Review    12/18/17 1056   Coping/Psychosocial Response Interventions   Plan Of Care Reviewed With patient   Outcome Evaluation   Outcome Summary/Follow up Plan Pt. will benefit from skilled inpt. P.T. to address his functional deficits and to assist pt. in regaining his maximum level of independence with functional mobility. P.T. rec.'s SNF placement given pt.'s NWB status and prior h/o of mobility impairments.         Problem: Inpatient Physical Therapy  Goal: Bed Mobility Goal LTG- PT    12/18/17 1056   Bed Mobility PT LTG   Bed Mobility PT LTG, Date Established 12/18/17   Bed Mobility PT LTG, Time to Achieve 5 - 7 days   Bed Mobility PT LTG, Activity Type all bed mobility   Bed Mobility PT LTG, [place] Level contact guard assist       Goal: Transfer Training Goal 1 LTG- PT    12/18/17 1056   Transfer Training PT LTG   Transfer Training PT LTG, Date Established 12/18/17   Transfer Training PT LTG, Time to Achieve 5 - 7 days   Transfer Training PT LTG, Activity Type sit to stand/stand to sit   Transfer Training PT LTG, [place] Level minimum assist (75% patient effort);2 person assist required   Transfer Training PT LTG, Assist Device walker, rolling   Transfer Training PT LTG, Additional Goal Maintaining NWB Left L.E.       Goal: Transfer Training Goal 2 LTG- PT    12/18/17 1056   Transfer Training 2 PT LTG   Transfer Training PT 2 LTG, Date Established 12/18/17   Transfer Training PT 2 LTG, Time to Achieve 5 - 7 days   Transfer Training PT 2 LTG, Activity Type bed to chair /chair to bed   Transfer Training PT 2 LTG, [place] Level minimum assist (75% patient effort);2 person assist required   Transfer Training PT 2 LTG, Assist Device walker, rolling   Transfer Training PT 2 LTG, Additional Goal Maintaining NWB Left L.E.

## 2017-12-18 NOTE — PLAN OF CARE
Problem: Patient Care Overview (Adult)  Goal: Plan of Care Review  Outcome: Ongoing (interventions implemented as appropriate)    12/18/17 2365   Coping/Psychosocial Response Interventions   Plan Of Care Reviewed With patient   Patient Care Overview   Progress no change   Outcome Evaluation   Outcome Summary/Follow up Plan S/P ORIF of left tibia/fibula, Cast padding and ace wrap in place. Using Oxygen at 3L per nasal cannula and at around 0200 started using his CPAP machine. Patient refused to take Dilaudid tablet tonight , prefers Dilaudid IV which according to patient works faster and better. Educated patient on the importance of BP monitoring r/t history of Hypertension.       Goal: Adult Individualization and Mutuality  Outcome: Ongoing (interventions implemented as appropriate)  Goal: Discharge Needs Assessment  Outcome: Ongoing (interventions implemented as appropriate)    Problem: Perioperative Period (Adult)  Goal: Signs and Symptoms of Listed Potential Problems Will be Absent or Manageable (Perioperative Period)  Outcome: Ongoing (interventions implemented as appropriate)    Problem: Fall Risk (Adult)  Goal: Absence of Falls  Outcome: Ongoing (interventions implemented as appropriate)    Problem: Pain, Acute (Adult)  Goal: Acceptable Pain Control/Comfort Level  Outcome: Ongoing (interventions implemented as appropriate)

## 2017-12-18 NOTE — ANESTHESIA POSTPROCEDURE EVALUATION
"Patient: Nicanor TAN Arlt    Procedure Summary     Date Anesthesia Start Anesthesia Stop Room / Location    12/17/17 1643 2056  [facility] / [facility]       Procedure Diagnosis Surgeon Provider    TIBIA/FIBULA OPEN REDUCTION INTERNAL FIXATION (Left Leg Lower) No diagnosis on file. MD Reese Hughes Jr., MD          Anesthesia Type: general  Last vitals  BP   124/68 (12/17/17 2205)   Temp   36.6 °C (97.8 °F) (12/17/17 2205)   Pulse   73 (12/17/17 2215)   Resp   16 (12/17/17 2215)     SpO2   97 % (12/17/17 2205)     Post Anesthesia Care and Evaluation    Patient location during evaluation: PACU  Patient participation: complete - patient participated  Level of consciousness: awake and alert  Pain management: adequate  Airway patency: patent  Anesthetic complications: No anesthetic complications    Cardiovascular status: acceptable  Respiratory status: acceptable  Hydration status: acceptable    Comments: /68  Pulse 73  Temp 36.6 °C (97.8 °F) (Oral)   Resp 16  Ht 175.3 cm (69\")  Wt 136 kg (300 lb)  SpO2 97%  BMI 44.3 kg/m2              "

## 2017-12-18 NOTE — PROGRESS NOTES
Discharge Planning Assessment  [facility]     Patient Name: Nicanor Haley  MRN: [id number]  Today's Date: 12/18/2017    Admit Date: 12/17/2017          Discharge Needs Assessment       12/18/17 1623    Living Environment    Lives With other (see comments)   Friend    Living Arrangements apartment    Home Accessibility bed and bath on same level;ramps present at home    Stair Railings at Home none    Type of Financial/Environmental Concern none    Transportation Available car;family or friend will provide    Living Environment    Provides Primary Care For no one, unable/limited ability to care for self    Primary Care Provided By other (see comments)   Friend    Quality Of Family Relationships supportive    Able to Return to Prior Living Arrangements yes    Discharge Needs Assessment    Concerns To Be Addressed discharge planning concerns    Equipment Currently Used at Home bipap/ cpap;walker, rolling    Equipment Needed After Discharge none    Discharge Facility/Level Of Care Needs home with home health    Current Discharge Risk dependent with mobility/activities of daily living    Discharge Disposition home healthcare service            Discharge Plan       12/18/17 1630    Case Management/Social Work Plan    Plan Home with [facility]- referral made to Kristie.    Patient/Family In Agreement With Plan yes    Additional Comments Met with the patient at bedside; explained role of CCP, checked IMM, verified facesheet and discussed discharge planning needs. The patient plans to return home upon d/c where he resides with his friend Gaviota who can assist him.   The patient uses a walker and c-pap, has a ramp to enter the first floor apartment, pharmacy is [facility], PCP is Ariel Betancourt and the patient denies any trouble remembering to take his medication or with affording his medication.  The patient states that he has been to [facility] previously and does not want to go to rehab again but did want a referral made to [facility].   Referral was made to Kristie.  HH/SNF list provided to patient.  CCP will follow to assist with patient's d/c home with [facility].  CAMELIA Farr        Discharge Placement     Facility/Agency Request Status Selected? Address Phone Number Fax Number    [facility] Pending - No Request Sent     [address] [address] [phone]                Demographic Summary       12/18/17 1621    Referral Information    Admission Type inpatient    Arrived From home or self-care    Referral Source nursing;physician    Reason For Consult discharge planning    Record Reviewed history and physical;medical record    Primary Care Physician Information    Name Ariel Betancourt MD            Functional Status       12/18/17 1621    Functional Status Current    Ambulation 4-->completely dependent    Transferring 4-->completely dependent    Toileting 3-->assistive equipment and person    Bathing 2-->assistive person    Dressing 2-->assistive person    Eating 0-->independent    Communication 0-->understands/communicates without difficulty    Swallowing (if score 2 or more for any item, consult Rehab Services) 0-->swallows foods/liquids without difficulty    Functional Status Prior    Ambulation 3-->assistive equipment and person    Transferring 3-->assistive equipment and person    Toileting 3-->assistive equipment and person    Bathing 2-->assistive person    Dressing 2-->assistive person    Eating 0-->independent    Communication 0-->understands/communicates without difficulty    Swallowing 0-->swallows foods/liquids without difficulty    IADL    Medications independent    Meal Preparation assistive equipment and person    Housekeeping assistive equipment and person    Laundry assistive equipment and person    Shopping assistive equipment and person    Oral Care independent            Psychosocial     None            Abuse/Neglect     None            Legal     None             Substance Abuse     None            Patient Forms       12/18/17 1620    Patient Forms    Provider Choice List Delivered    Delivered to Patient    Method of delivery In person          CAMELIA Sheets

## 2017-12-18 NOTE — PLAN OF CARE
Problem: Patient Care Overview (Adult)  Goal: Plan of Care Review  Outcome: Ongoing (interventions implemented as appropriate)    12/18/17 1613   Coping/Psychosocial Response Interventions   Plan Of Care Reviewed With patient   Patient Care Overview   Progress improving   Outcome Evaluation   Outcome Summary/Follow up Plan VSS. Pain controlled with dilaudid. Splint with ACE to LLE. Blood noted on ACE and sheet of bed. Call out to Dr. Acevedo. Waiting for return call. Remains NWB to LLE. Discussed BP monitoring and med r/tHTN.          Problem: Perioperative Period (Adult)  Goal: Signs and Symptoms of Listed Potential Problems Will be Absent or Manageable (Perioperative Period)  Outcome: Ongoing (interventions implemented as appropriate)    Problem: Fall Risk (Adult)  Goal: Identify Related Risk Factors and Signs and Symptoms  Outcome: Ongoing (interventions implemented as appropriate)  Goal: Absence of Falls  Outcome: Ongoing (interventions implemented as appropriate)    Problem: Pain, Acute (Adult)  Goal: Identify Related Risk Factors and Signs and Symptoms  Outcome: Outcome(s) achieved Date Met:  12/18/17  Goal: Acceptable Pain Control/Comfort Level  Outcome: Outcome(s) achieved Date Met:  12/18/17

## 2017-12-18 NOTE — OP NOTE
Procedure Note    Nicanor TAN Tera  12/17/2017    Pre-op Diagnosis:   1. Closed left oblique tibial shaft fracture  2.  Closed left bimalleolar ankle fracture       Post-Op Diagnosis Codes:   Same    Procedure:    1.  Open reduction internal fixation, left tibial shaft fracture  2.  Open reduction internal fixation, left bimalleolar ankle fracture    Surgeon(s):  Lai Acevedo Jr., MD    Anesthesia: General    Estimated Blood Loss:150cc    Specimens:                None      Drains: None  Complications: None apparent    Disposition: Stable to PACU for recovery    Indications for procedure:   The patient is a morbidly obese 67-year-old male who presented with the above listed injuries after a ground-level fall.  Operative stabilization of his injuries was recommended. The risks/benefits of surgery, including pain, infection, wound healing problems, need for future procedures, mal/nonunion, DVT/PE, cardiac event, and/or death were discussed, and the patient elected to proceed with surgery.    Procedure in detail:    The correct patient was identified in preoperative holding.  All risks and benefits of surgery were again discussed in detail, and the patient agreed to proceed with surgery.  The operative extremity was confirmed and marked.  Operative consent reviewed and confirmed to be signed.    At this time, the patient was wheeled to the operative theatre and placed supine on the OR table.  Anesthesia was induced smoothly by our anesthesia colleagues.  The left lower extremity was prepped and draped in standard sterile fashion.  Appropriate presurgical timeout was performed, confirming correct patient, correct extremity, correct procedure, availability of sterile instruments/implants, and the administration of intravenous antibiotics within one hour of skin incision.       A longitudinal incision is made over the anterior ankle with a #15 blade and careful subcutaneous dissection carried down to the retinacular layer.  Any branches of the  superficial peroneal nerve are carefully identified and protected. The tibialis anterior and EHL tendons are identified. The retinaculum/fascia layer is opened in longitudinal fashion in the TA/EHL interval.   The EHL and deep neurovascular bundle was retracted laterally and protected for the remainder of the case. The oblique tibial diaphyseal fracture was immediately identified.      Attention was turned distally to the level of the ankle joint.  Under fluoroscopy, a reduction clamp was used percutaneously to reduce the posterior malleolus fracture.   A guidewire for the 4.0 Tamara partially threaded screw set was then placed from anteromedial to posterolateral.  Idealized placement was confirmed on biplanar fluoroscopy.  This was then measured, drilled, and 4.0 partially threaded compression screw placed with excellent purchase.  Fluoroscopy at this time confirmed anatomic reduction of the tibial plafond on both AP and lateral fluoroscopy.    At this time, attention was turned back to the tibial diaphyseal fracture.  Soft tissue was elevated.  The fracture site cleared of fracture hematoma using irrigation, rongeur, dental pick.  An anatomic reduction was then performed using gentle traction and a reduction clamp.  This was confirmed by direct visualization as well as fluoroscopy.  Three 3.5mm cortical screws were then placed in lag-by-technique fashion by overdrilling the near cortex and placing a bicortical screw.  This resulted in excellent compression and maintain reduction across the fracture site.    A 10 hole Tamara anterolateral distal tibia plate was then selected.  After using an elevator to gain subperiosteal dissection proximally, this plate was placed in idealized position and provisionally pinned in place.  Excellent position was confirmed on fluoroscopy.  The plate was then filled in standard fashion, using unicortical 3.5 mm locking screws in the distal row, and combination  of 3.5 mm nonlocking and locking screws in the metaphysis distal to the fracture site.  Under lateral fluoroscopy, small stab incisions were made over the proximal screw holes.  Dissection was carried down bluntly to the plate.  Under fluoroscopy, four 3.5 mm screws were placed proximal to the fracture site in standard bicortical fashion with excellent purchase.    The distal fibula was then palpated at the lateral ankle and a longitudinal incision is made centered over it with a #15 blade.  Careful subcutaneous dissection is carried down to the fibula, taking great care to look for and protect any branches of the superficial peroneal nerve.  A #15 blade is then used to gain appropriate subperiosteal dissection at the identified distal fibula fracture.  Hematoma and fibrous contents are gently removed from the fracture site.  The fracture was segmental, with comminution at both the distal and proximal aspects.  This was clearly not amenable to traditional lag screw fixation.  The decision was made to proceed with bridge plate technique.  The fracture was reduced with a combination of small reduction clamps, using K wires to hold the reduction.  Appropriate restoration of fibular length and acceptable reduction was confirmed by direct visualization as well as fluoroscopy.    A 9 hole [place]land anatomic fibular plate was selected and provisionally pinned in place.  I proceeded with fixation of this bridge device using 3.5 mm locking screws distally (unicortical) and 3.5 mm cortical screws proximally (bicortical).     At this time, direct inspection and manual stress tests (Cotton test on mortise/lateral views and ER stress test) were used along with intraoperative fluoroscopy to assess the stability of the syndesmosis.  The syndesmosis remained well-reduced and stable, without laxity or widening.  The ankle was stable and no additional fixation/repair was needed.  Final fluoroscopic AP, mortise, and lateral views of the  "ankle confirmed anatomic reduction of the ankle, distal syndesmosis, and tibial shaft fracture and all hardware was in appropriate position and of appropriate length.      The wounds were thoroughly irrigated out of any debris and closed in meticulous, layered fashion with 00 vicryl, 000 vicryl, and finally Nylon and staples on the skin.  The skin was cleaned and dried and a sterile dressing applied, followed by a short leg splint (posterior slab and stirrup) with the ankle in neutral position.  The tourniquet was released and brisk capillary refill returns to all toes.  The patient was awoken from anesthesia without apparent competition and taken to PACU for recovery.    Postoperative Plan:  Weightbearing status: Non-weightbearing in the splint  DVT Prophylaxis:Lovenox 40mg daily;  Patient will be instructed to elevate as much as possible (\"toes above the nose\") and to get up and move around at least once per hour while awake to help minimize risk of blood clots.            Lai Acevedo Jr, MD     Date: 12/17/2017  Time: 9:00 PM        "

## 2017-12-18 NOTE — NURSING NOTE
Spoke with Dr. Acevedo r/t blood noted on ACE and bed sheet. Orders to apply another ACE wrap to reinforce. Dr. Acevedo will evaluate in AM. Pt was notified and another ACE applied.

## 2017-12-18 NOTE — DISCHARGE PLACEMENT REQUEST
"Nikita Dee (67 y.o. Male)     Date of Birth Social Security Number Address Home Phone MRN    1950  [id number]  [address]  [place] [facility] [address] [phone] [id number]    Buddhist Marital Status          [facility] Single       Admission Date Admission Type Admitting Provider Attending Provider Department, Room/Bed    12/17/17 Emergency Ariel Betancourt MD Ljubic, Ivan, MD [facility] [address], P886/1    Discharge Date Discharge Disposition Discharge Destination                      Attending Provider: Ariel Betancourt MD     Allergies:  Keflex [Cephalexin]    Isolation:  None   Infection:  None   Code Status:  Prior    Ht:  175.3 cm (69\")   Wt:  145 kg (319 lb)    Admission Cmt:  None   Principal Problem:  None                Active Insurance as of 12/17/2017     Primary Coverage     Payor Plan Insurance Group Employer/Plan Group    ANTHEM MEDICARE REPLACEMENT ANTHEM MEDICARE ADVANTAGE [id number]     Payor Plan Address Payor Plan Phone Number Effective From Effective To    [address] [phone] 4/1/2016     [place] [address]       Subscriber Name Subscriber Birth Date Member ID       NIKITA EDE 1950 [id number]           Secondary Coverage     Payor Plan Insurance Group Employer/Plan Group    [place]      Payor Plan Address Payor Plan Phone Number Effective From Effective To    [address] [phone] 3/15/2017     [place] [address]       Subscriber Name Subscriber Birth Date Member ID       NIKITA DEE 1950 [id number]                 Emergency Contacts      (Rel.) Home Phone Work Phone Mobile Phone    Gaviota Amezquita (Friend) [phone] -- --              "

## 2017-12-19 LAB
ALBUMIN SERPL-MCNC: 2.9 G/DL (ref 3.5–5.2)
ALBUMIN/GLOB SERPL: 0.9 G/DL
ALP SERPL-CCNC: 36 U/L (ref 39–117)
ALT SERPL W P-5'-P-CCNC: 13 U/L (ref 1–41)
ANION GAP SERPL CALCULATED.3IONS-SCNC: 9.9 MMOL/L
AST SERPL-CCNC: 20 U/L (ref 1–40)
BILIRUB SERPL-MCNC: 1 MG/DL (ref 0.1–1.2)
BUN BLD-MCNC: 17 MG/DL (ref 8–23)
BUN/CREAT SERPL: 21.3 (ref 7–25)
CALCIUM SPEC-SCNC: 8.6 MG/DL (ref 8.6–10.5)
CHLORIDE SERPL-SCNC: 97 MMOL/L (ref 98–107)
CO2 SERPL-SCNC: 29.1 MMOL/L (ref 22–29)
CREAT BLD-MCNC: 0.8 MG/DL (ref 0.76–1.27)
GFR SERPL CREATININE-BSD FRML MDRD: 96 ML/MIN/1.73
GLOBULIN UR ELPH-MCNC: 3.4 GM/DL
GLUCOSE BLD-MCNC: 114 MG/DL (ref 65–99)
MAGNESIUM SERPL-MCNC: 1.9 MG/DL (ref 1.6–2.4)
POTASSIUM BLD-SCNC: 3.3 MMOL/L (ref 3.5–5.2)
PROT SERPL-MCNC: 6.3 G/DL (ref 6–8.5)
SODIUM BLD-SCNC: 136 MMOL/L (ref 136–145)

## 2017-12-19 PROCEDURE — 94799 UNLISTED PULMONARY SVC/PX: CPT

## 2017-12-19 PROCEDURE — 25010000002 ENOXAPARIN PER 10 MG: Performed by: ORTHOPAEDIC SURGERY

## 2017-12-19 PROCEDURE — 80053 COMPREHEN METABOLIC PANEL: CPT | Performed by: SPECIALIST

## 2017-12-19 PROCEDURE — 83735 ASSAY OF MAGNESIUM: CPT | Performed by: SPECIALIST

## 2017-12-19 PROCEDURE — 25010000002 HYDROMORPHONE PER 4 MG: Performed by: ORTHOPAEDIC SURGERY

## 2017-12-19 RX ORDER — POLYETHYLENE GLYCOL 3350 17 G/17G
17 POWDER, FOR SOLUTION ORAL DAILY
Status: DISCONTINUED | OUTPATIENT
Start: 2017-12-19 | End: 2017-12-23 | Stop reason: HOSPADM

## 2017-12-19 RX ORDER — ACETAMINOPHEN 325 MG/1
650 TABLET ORAL EVERY 6 HOURS PRN
Status: DISCONTINUED | OUTPATIENT
Start: 2017-12-19 | End: 2017-12-23 | Stop reason: HOSPADM

## 2017-12-19 RX ADMIN — LEVOTHYROXINE SODIUM 125 MCG: 0.12 TABLET ORAL at 06:29

## 2017-12-19 RX ADMIN — HYDROMORPHONE HYDROCHLORIDE 4 MG: 2 TABLET ORAL at 08:28

## 2017-12-19 RX ADMIN — ACETAMINOPHEN 650 MG: 325 TABLET ORAL at 23:53

## 2017-12-19 RX ADMIN — HYDROMORPHONE HYDROCHLORIDE 0.5 MG: 1 INJECTION, SOLUTION INTRAMUSCULAR; INTRAVENOUS; SUBCUTANEOUS at 16:16

## 2017-12-19 RX ADMIN — Medication 25 MG: at 21:00

## 2017-12-19 RX ADMIN — HYDROMORPHONE HYDROCHLORIDE 4 MG: 2 TABLET ORAL at 05:04

## 2017-12-19 RX ADMIN — HYDROMORPHONE HYDROCHLORIDE 4 MG: 2 TABLET ORAL at 00:07

## 2017-12-19 RX ADMIN — HYDROCHLOROTHIAZIDE 25 MG: 25 TABLET ORAL at 08:31

## 2017-12-19 RX ADMIN — ISOSORBIDE MONONITRATE 30 MG: 30 TABLET ORAL at 08:28

## 2017-12-19 RX ADMIN — HYDROMORPHONE HYDROCHLORIDE 0.5 MG: 1 INJECTION, SOLUTION INTRAMUSCULAR; INTRAVENOUS; SUBCUTANEOUS at 11:55

## 2017-12-19 RX ADMIN — ACETAMINOPHEN 650 MG: 325 TABLET ORAL at 01:36

## 2017-12-19 RX ADMIN — PROBENECID 500 MG: 500 TABLET, FILM COATED ORAL at 18:26

## 2017-12-19 RX ADMIN — HYDROMORPHONE HYDROCHLORIDE 0.5 MG: 1 INJECTION, SOLUTION INTRAMUSCULAR; INTRAVENOUS; SUBCUTANEOUS at 18:26

## 2017-12-19 RX ADMIN — ENOXAPARIN SODIUM 40 MG: 40 INJECTION SUBCUTANEOUS at 08:29

## 2017-12-19 RX ADMIN — HYDROMORPHONE HYDROCHLORIDE 0.5 MG: 1 INJECTION, SOLUTION INTRAMUSCULAR; INTRAVENOUS; SUBCUTANEOUS at 23:14

## 2017-12-19 RX ADMIN — HYDROMORPHONE HYDROCHLORIDE 0.5 MG: 1 INJECTION, SOLUTION INTRAMUSCULAR; INTRAVENOUS; SUBCUTANEOUS at 01:37

## 2017-12-19 RX ADMIN — PROBENECID 500 MG: 500 TABLET, FILM COATED ORAL at 08:29

## 2017-12-19 RX ADMIN — SERTRALINE HYDROCHLORIDE 100 MG: 100 TABLET, FILM COATED ORAL at 08:30

## 2017-12-19 RX ADMIN — Medication 25 MG: at 08:30

## 2017-12-19 RX ADMIN — ATORVASTATIN CALCIUM 10 MG: 10 TABLET, FILM COATED ORAL at 08:28

## 2017-12-19 RX ADMIN — TRAZODONE HYDROCHLORIDE 150 MG: 150 TABLET, FILM COATED ORAL at 21:00

## 2017-12-19 RX ADMIN — POLYETHYLENE GLYCOL (3350) 17 G: 17 POWDER, FOR SOLUTION ORAL at 18:40

## 2017-12-19 NOTE — PLAN OF CARE
Problem: Patient Care Overview (Adult)  Goal: Plan of Care Review  Outcome: Ongoing (interventions implemented as appropriate)    12/19/17 0210   Coping/Psychosocial Response Interventions   Plan Of Care Reviewed With patient   Patient Care Overview   Progress progress toward functional goals as expected   Outcome Evaluation   Outcome Summary/Follow up Plan meds per order, pain meds per request, no falls, encourage po, IS use and turning, see vs and labs, temp tx per order, pt educated on bp monitoring r/t hx HTN       Goal: Adult Individualization and Mutuality  Outcome: Ongoing (interventions implemented as appropriate)  Goal: Discharge Needs Assessment  Outcome: Ongoing (interventions implemented as appropriate)    Problem: Fall Risk (Adult)  Goal: Identify Related Risk Factors and Signs and Symptoms  Outcome: Outcome(s) achieved Date Met:  12/19/17  Goal: Absence of Falls  Outcome: Ongoing (interventions implemented as appropriate)    Problem: Pain, Acute (Adult)  Goal: Acceptable Pain Control/Comfort Level  Outcome: Ongoing (interventions implemented as appropriate)

## 2017-12-19 NOTE — PROGRESS NOTES
"Orthopaedic Foot and Ankle Surgery  Daily Progress Note    /63 (BP Location: Right arm, Patient Position: Lying)  Pulse 78  Temp 96.5 °F (35.8 °C) (Oral)   Resp 18  Ht 175.3 cm (69\")  Wt (!) 145 kg (319 lb)  SpO2 93%  BMI 47.11 kg/m2        SUBJECTIVE  Reports pain controlled    PHYSICAL EXAM    Splint clean, dry, intact  Toes warm, perfused, BCR  Flexes/extends toes  SILT over toes  No pain with passive stretch     Principal Problem:    Closed left tibial fracture  Active Problems:    Closed bimalleolar fracture of left ankle      PLAN / DISPOSITION:  POD 2 s/p ORIF left tibia/fibula, doing well    1. Pain control: On significant chronic opiates at baseline; pain currently appears under control at current regimen, dilaudid 4-6mg po Q3H.  2.  Antibiotics: Periop Clinda ended  3.  PT: Strict NWB, LLE  4. DVT: Lovenox 40mg plus RONNIE/SCD, mobilization  5. Dispo: pending, follow up 2-3 weeks with me at [facility] ([phone] to make appointment)    Lai Acevedo Jr, MD  12/19/17  7:49 AM  "

## 2017-12-19 NOTE — PROGRESS NOTES
Progress Note  Ariel Betancourt MD    Patient ID:  Name:  Nicanor Haley  MRN:  [id number]  1950  67 y.o.  male            CC/Reason for visit:Left leg fracture    Subjective: better,did not have BM    Vitals:  Vitals:    12/19/17 0500 12/19/17 0700 12/19/17 1100 12/19/17 1500   BP: 112/63 137/69 119/64 115/71   BP Location: Right arm Right arm Right arm Right arm   Patient Position: Lying Lying Lying Lying   Pulse: 78 87 87 79   Resp: 18 16 18 20   Temp: 96.5 °F (35.8 °C) 99.2 °F (37.3 °C) 98.6 °F (37 °C) 97.6 °F (36.4 °C)   TempSrc: Oral Oral Oral Oral   SpO2: 93% 98% 94% 97%   Weight:       Height:               Body mass index is 47.11 kg/(m^2).    Intake/Output Summary (Last 24 hours) at 12/19/17 1659  Last data filed at 12/19/17 1155   Gross per 24 hour   Intake             1410 ml   Output              600 ml   Net              810 ml       Exam:  GEN:  No distress, appears stated age  EYES:   EOM-i, anicteric sclera bilat  ENT:    External ears/nose normal, OP clear  NECK:  Supple, midline trachea  LUNGS: Clear breath sounds bilat, nonlabored breathing  CV:  Normal S1S2, without murmur, no edema  ABD:  Non tender, no enlarged liver or masses  EXT:  No cyanosis or clubbing    Scheduled meds:    atorvastatin 10 mg Oral Daily   enoxaparin 40 mg Subcutaneous Daily   hydrochlorothiazide 25 mg Oral Daily   isosorbide mononitrate 30 mg Oral Daily   levothyroxine 125 mcg Oral Q AM   metoprolol tartrate 25 mg Oral Q12H   probenecid 500 mg Oral BID   sertraline 100 mg Oral Daily   traZODone 150 mg Oral Nightly     IV meds:                           Data Review:   I reviewed the patient's medications and new clinical results.  Lab Results   Component Value Date    CALCIUM 8.6 12/19/2017       Results from last 7 days  Lab Units 12/19/17  0357 12/17/17  1620 12/17/17  1026   SODIUM mmol/L 136  --  142   POTASSIUM mmol/L 3.3*  --  3.4*   CHLORIDE mmol/L 97*  --  99   CO2 mmol/L 29.1*  --  31.6*   BUN mg/dL 17  --  17    CREATININE mg/dL 0.80  --  1.06   CALCIUM mg/dL 8.6  --  9.8   BILIRUBIN mg/dL 1.0  --  0.6   ALK PHOS U/L 36*  --  61   ALT (SGPT) U/L 13  --  15   AST (SGOT) U/L 20  --  16   GLUCOSE mg/dL 114*  --  103*   WBC 10*3/mm3  --   --  4.10*   HEMOGLOBIN g/dL  --   --  14.5   PLATELETS 10*3/mm3  --   --  138*   INR   --  1.07  --                        Estimated Creatinine Clearance: 126.7 mL/min (by C-G formula based on Cr of 0.8).    WEIGHTS:     Wt Readings from Last 1 Encounters:   12/17/17 2328 (!) 145 kg (319 lb)   12/17/17 1004 136 kg (300 lb)         ASSESSMENT:   Principal Problem:    Closed left tibial fracture  Active Problems:    Closed bimalleolar fracture of left ankle      PLAN:  Pain control  PT note apreciated  Await BM  Discharge planning to evaluate        Ariel Betancourt MD  12/19/2017

## 2017-12-19 NOTE — PROGRESS NOTES
Continued Stay Note  [facility]     Patient Name: Nicanor Haley  MRN: [id number]  Today's Date: 12/19/2017    Admit Date: 12/17/2017          Discharge Plan       12/19/17 6479    Case Management/Social Work Plan    Plan SNF referrals made.    Patient/Family In Agreement With Plan yes    Additional Comments P.T. notes indicate that patient is max assist for bed mobility and the recommendation is rehab.  Met with the patient at bedside and informed him of recommendation from P.T.  The patient stated that he is interested in [facility] acute rehab. Voicemail message left for [facility] acute rehab to eval the patient.  The patient stated that if SNF is needed that he would like referrals to be made to 1st choice- [place], 2nd choice- [place] and then he is agreeable to referrals being made to [facility], [facility], [facility] and [facility] facilities.  The patient requested that referrals not be made to [place], [facility], [facility], [facility], [facility]  Referrals made to [facility], [place] [facility], [facility], [facility], [facility], [facility], [facility], [facility], [facility] 2], [facility], [facility] and [facility].  [facility] will follow up for evals and make more referrals if necessary.  Pre-cert is needed.  CAMELIA Farr              Discharge Codes     None            CAMELIA Sheets

## 2017-12-19 NOTE — PLAN OF CARE
Problem: Patient Care Overview (Adult)  Goal: Plan of Care Review    12/19/17 6525   Coping/Psychosocial Response Interventions   Plan Of Care Reviewed With patient   Outcome Evaluation   Outcome Summary/Follow up Plan Pt demonstrates continued generalized weakness, impaired sitting balance, and decreased indeepndence w/ mobility. Requires maximal assist for bed mobility and scooting. Unable to maintain sitting balance - requires min to moderate assist. Assist for LE exercises. Reviewed NWB LLE precaution - pt unable to maintain in sitting. At this time, pt not safe to transfer bed<>chair - recommend julissa. Discussed strong recommendation for DC to [facility] w/ pt, RN and CCP.

## 2017-12-19 NOTE — THERAPY TREATMENT NOTE
Acute Care - Physical Therapy Treatment Note  [facility]     Patient Name: Nicanor Haley  : 1950  MRN: [id number]  Today's Date: 2017  Onset of Illness/Injury or Date of Surgery Date: 17  Date of Referral to PT: 17  Referring Physician: Lai Muro    Admit Date: 2017    Visit Dx:    ICD-10-CM ICD-9-CM   1. Fall, initial encounter W19.XXXA E888.9   2. Fracture tibia/fibula, left, closed, initial encounter S82.202A 823.82    S82.402A    3. Contusion of scalp, initial encounter S00.03XA 920   4. Rule Out Dislocation of left patella, initial encounter S83.005A 836.3   5. Muscle weakness (generalized) M62.81 728.87     Patient Active Problem List   Diagnosis   • History of colon polyps   • Acute pancreatitis due to calculus of common bile duct   • Abdominal pain, generalized   • Closed left tibial fracture   • Closed bimalleolar fracture of left ankle               Adult Rehabilitation Note       17 1647          Rehab Assessment/Intervention    Discipline physical therapist  -AR      Document Type therapy note (daily note)  -AR      Subjective Information agree to therapy  -AR      Patient Effort, Rehab Treatment adequate  -AR      Precautions/Limitations fall precautions   NWB LLE  -AR      Recorded by [AR] Agnes Mcelroy PT      Pain Assessment    Pain Assessment No/denies pain  -AR      Recorded by [AR] Agnes Mcelroy, PT      Cognitive Assessment/Intervention    Current Cognitive/Communication Assessment impaired  -AR      Orientation Status oriented to;person;place  -AR      Follows Commands/Answers Questions 100% of the time;able to follow single-step instructions;needs cueing;needs increased time;needs repetition  -AR      Recorded by [AR] Agnes Mcelroy, PT      Bed Mobility, Assessment/Treatment    Bed Mobility, Assistive Device bed rails;head of bed elevated;draw sheet  -AR      Bed Mob, Supine to Sit, [place] maximum assist (25% patient effort)  -AR       Bed Mob, Sit to Supine, [place] maximum assist (25% patient effort)  -AR      Bed Mobility, Comment sitting balance w/ min to mod A  -AR      Recorded by [AR] Agnes Mcelroy PT      Transfer Assessment/Treatment    Transfers, Bed-Chair [place] not appropriate to assess   currently recommend t/f via julissa - JASSI notified   -AR      Transfers, Sit-Stand [place] not appropriate to assess  -AR      Transfer, Comment uanble to maintain NWB in sitting, poor sitting balance not safe to stand today  -AR      Recorded by [AR] Agnes Mcelroy PT      Gait Assessment/Treatment    Gait, [place] Level not appropriate to assess  -AR      Recorded by [AR] Agnes Mcelroy PT      Therapy Exercises    Bilateral Lower Extremities 10 reps   LAQ w/ assist   -AR      Recorded by [AR] Agnes Mcelroy PT      Positioning and Restraints    Pre-Treatment Position in bed  -AR      Post Treatment Position bed  -AR      In Bed notified nsg;supine;call light within reach;encouraged to call for assist;exit alarm on  -AR      Recorded by [AR] Agnes Mcelroy PT        User Key  (r) = Recorded By, (t) = Taken By, (c) = Cosigned By    Initials Name Effective Dates    AR Agnes Mcelroy, PT 06/27/16 -                 IP PT Goals       12/18/17 1056          Bed Mobility PT LTG    Bed Mobility PT LTG, Date Established 12/18/17  -MS      Bed Mobility PT LTG, Time to Achieve 5 - 7 days  -MS      Bed Mobility PT LTG, Activity Type all bed mobility  -MS      Bed Mobility PT LTG, [place] Level contact guard assist  -MS      Transfer Training PT LTG    Transfer Training PT LTG, Date Established 12/18/17  -MS      Transfer Training PT LTG, Time to Achieve 5 - 7 days  -MS      Transfer Training PT LTG, Activity Type sit to stand/stand to sit  -MS      Transfer Training PT LTG, [place] Level minimum assist (75% patient effort);2 person assist required  -MS      Transfer Training PT LTG, Assist Device walker, rolling  -MS       Transfer Training PT LTG, Additional Goal Maintaining NWB Left L.E.  -MS      Transfer Training 2 PT LTG    Transfer Training PT 2 LTG, Date Established 12/18/17  -MS      Transfer Training PT 2 LTG, Time to Achieve 5 - 7 days  -MS      Transfer Training PT 2 LTG, Activity Type bed to chair /chair to bed  -MS      Transfer Training PT 2 LTG, [place] Level minimum assist (75% patient effort);2 person assist required  -MS      Transfer Training PT 2 LTG, Assist Device walker, rolling  -MS      Transfer Training PT 2 LTG, Additional Goal Maintaining NWB Left L.E.  -MS        User Key  (r) = Recorded By, (t) = Taken By, (c) = Cosigned By    Initials Name Provider Type    MS Elvis Tejeda, PT Physical Therapist          Physical Therapy Education     Title: PT OT SLP Therapies (Active)     Topic: Physical Therapy (Active)     Point: Mobility training (Active)    Learning Progress Summary    Learner Readiness Method Response Comment Documented by Status   Patient Acceptance E NR  AR 12/19/17 1649 Active    Acceptance E,D VU,NR  MS 12/18/17 1056 Done               Point: Home exercise program (Active)    Learning Progress Summary    Learner Readiness Method Response Comment Documented by Status   Patient Acceptance E NR  AR 12/19/17 1649 Active    Acceptance E,D VU,NR  MS 12/18/17 1056 Done               Point: Body mechanics (Active)    Learning Progress Summary    Learner Readiness Method Response Comment Documented by Status   Patient Acceptance E NR  AR 12/19/17 1649 Active    Acceptance E,D VU,NR  MS 12/18/17 1056 Done               Point: Precautions (Active)    Learning Progress Summary    Learner Readiness Method Response Comment Documented by Status   Patient Acceptance E NR  AR 12/19/17 1649 Active    Acceptance E,D VU,NR  MS 12/18/17 1056 Done                      User Key     Initials Effective Dates Name Provider Type Discipline    MS 12/01/15 -  Elvis Tejeda, CAIO Physical Therapist PT    AR  06/27/16 -  Agnes Mcelroy PT Physical Therapist PT                    PT Recommendation and Plan  Anticipated Discharge Disposition: skilled nursing facility  Planned Therapy Interventions: balance training, bed mobility training, gait training, home exercise program, patient/family education, postural re-education, strengthening, transfer training  PT Frequency: daily  Plan of Care Review  Plan Of Care Reviewed With: patient  Outcome Summary/Follow up Plan: Pt demonstrates continued generalized weakness, impaired sitting balance, and decreased indeepndence w/ mobility.  Requires maximal assist for bed mobility and scooting.  Unable to maintain sitting balance - requires min to moderate assist.  Assist for LE exercises.  Reviewed NWB LLE precaution - pt unable to maintain in sitting.  At this time, pt not safe to transfer bed<>chair - recommend julissa.  Discussed strong recommendation for DC to [facility] w/ pt, RN and CCP.            Outcome Measures       12/19/17 1600 12/18/17 1000       How much help from another person do you currently need...    Turning from your back to your side while in flat bed without using bedrails? 2  -AR 3  -MS     Moving from lying on back to sitting on the side of a flat bed without bedrails? 2  -AR 2  -MS     Moving to and from a bed to a chair (including a wheelchair)? 1  -AR 1  -MS     Standing up from a chair using your arms (e.g., wheelchair, bedside chair)? 1  -AR 1  -MS     Climbing 3-5 steps with a railing? 1  -AR 1  -MS     To walk in hospital room? 1  -AR 1  -MS     AM-PAC 6 Clicks Score 8  -AR 9  -MS     Functional Assessment    Outcome Measure Options  AM-PAC 6 Clicks Basic Mobility (PT)  -MS       User Key  (r) = Recorded By, (t) = Taken By, (c) = Cosigned By    Initials Name Provider Type    MS Elvis Tejeda, PT Physical Therapist    FRANCISCO Mcelroy, PT Physical Therapist           Time Calculation:         PT Charges       12/19/17 9241          Time Calculation     Start Time 1635  -AR      Stop Time 1651  -AR      Time Calculation (min) 16 min  -AR      PT Received On 12/19/17  -AR      PT - Next Appointment 12/20/17  -AR        User Key  (r) = Recorded By, (t) = Taken By, (c) = Cosigned By    Initials Name Provider Type    AR Agnes Mcelroy PT Physical Therapist              PT G-Codes  Outcome Measure Options: AM-PAC 6 Clicks Basic Mobility (PT)    Agnes Mcelroy PT  12/19/2017

## 2017-12-19 NOTE — PLAN OF CARE
Problem: Patient Care Overview (Adult)  Goal: Plan of Care Review  Outcome: Ongoing (interventions implemented as appropriate)    12/19/17 1548   Coping/Psychosocial Response Interventions   Plan Of Care Reviewed With patient   Patient Care Overview   Progress improving   Outcome Evaluation   Outcome Summary/Follow up Plan VSs. Pain controlled with PO Dilaudid and IV dilaudid x1 for breakthrough pain. Remains NWB to LLE. Dressing c/d/i. Continues to use IS. No elevated temp noted this shift. Discussed BP monitoring and med r/t HTN.          Problem: Perioperative Period (Adult)  Goal: Signs and Symptoms of Listed Potential Problems Will be Absent or Manageable (Perioperative Period)  Outcome: Outcome(s) achieved Date Met:  12/19/17    Problem: Fall Risk (Adult)  Goal: Absence of Falls  Outcome: Ongoing (interventions implemented as appropriate)    Problem: Pain, Acute (Adult)  Goal: Acceptable Pain Control/Comfort Level  Outcome: Ongoing (interventions implemented as appropriate)

## 2017-12-19 NOTE — DISCHARGE PLACEMENT REQUEST
"Nikita Dee (67 y.o. Male)     Date of Birth Social Security Number Address Home Phone MRN    1950  [address]  [address]  [place] [phone] [id number]    [facility] Status          [facility] Single       Admission Date Admission Type Admitting Provider Attending Provider Department, Room/Bed    12/17/17 Emergency Ariel Betancourt MD Ljubic, Ivan, MD [facility] [address] [place], P886/1    Discharge Date Discharge Disposition Discharge Destination                      Attending Provider: Ariel Betancourt MD     Allergies:  Keflex [Cephalexin]    Isolation:  None   Infection:  None   Code Status:  Prior    Ht:  175.3 cm (69\")   Wt:  145 kg (319 lb)    Admission Cmt:  None   Principal Problem:  Closed left tibial fracture [S82.202A]                 Active Insurance as of 12/17/2017     Primary Coverage     Payor Plan Insurance Group Employer/Plan Group    ANTHEM MEDICARE REPLACEMENT ANTHEM MEDICARE ADVANTAGE [id number]     Payor Plan Address Payor Plan Phone Number Effective From Effective To    [address] [phone] 4/1/2016     [place] [address]       Subscriber Name Subscriber Birth Date Member ID       NIKITA DEE 1950 [id number]           Secondary Coverage     Payor Plan Insurance Group Employer/Plan Group    [place]      Payor Plan Address Payor Plan Phone Number Effective From Effective To    [address] [phone] 3/15/2017     [place] [address]       Subscriber Name Subscriber Birth Date Member ID       NIKITA DEE 1950 [id number]                 Emergency Contacts      (Rel.) Home Phone Work Phone Mobile Phone    Gaviota Amezquita (Friend) [phone] -- --              "

## 2017-12-20 PROBLEM — S82.202A LEFT TIBIAL FRACTURE: Status: ACTIVE | Noted: 2017-12-20

## 2017-12-20 PROCEDURE — 97110 THERAPEUTIC EXERCISES: CPT

## 2017-12-20 PROCEDURE — 25010000002 ENOXAPARIN PER 10 MG: Performed by: ORTHOPAEDIC SURGERY

## 2017-12-20 PROCEDURE — 94799 UNLISTED PULMONARY SVC/PX: CPT

## 2017-12-20 RX ORDER — POTASSIUM CHLORIDE 750 MG/1
40 CAPSULE, EXTENDED RELEASE ORAL ONCE
Status: COMPLETED | OUTPATIENT
Start: 2017-12-20 | End: 2017-12-20

## 2017-12-20 RX ORDER — DOCUSATE SODIUM 100 MG/1
100 CAPSULE, LIQUID FILLED ORAL DAILY
Status: DISCONTINUED | OUTPATIENT
Start: 2017-12-21 | End: 2017-12-23 | Stop reason: HOSPADM

## 2017-12-20 RX ADMIN — POLYETHYLENE GLYCOL (3350) 17 G: 17 POWDER, FOR SOLUTION ORAL at 08:49

## 2017-12-20 RX ADMIN — HYDROMORPHONE HYDROCHLORIDE 4 MG: 2 TABLET ORAL at 08:37

## 2017-12-20 RX ADMIN — HYDROMORPHONE HYDROCHLORIDE 4 MG: 2 TABLET ORAL at 11:28

## 2017-12-20 RX ADMIN — Medication 25 MG: at 08:51

## 2017-12-20 RX ADMIN — HYDROMORPHONE HYDROCHLORIDE 4 MG: 2 TABLET ORAL at 18:32

## 2017-12-20 RX ADMIN — TRAZODONE HYDROCHLORIDE 150 MG: 150 TABLET, FILM COATED ORAL at 21:52

## 2017-12-20 RX ADMIN — PROBENECID 500 MG: 500 TABLET, FILM COATED ORAL at 18:32

## 2017-12-20 RX ADMIN — Medication 25 MG: at 21:52

## 2017-12-20 RX ADMIN — ISOSORBIDE MONONITRATE 30 MG: 30 TABLET ORAL at 08:54

## 2017-12-20 RX ADMIN — POTASSIUM CHLORIDE 40 MEQ: 750 CAPSULE, EXTENDED RELEASE ORAL at 15:47

## 2017-12-20 RX ADMIN — LEVOTHYROXINE SODIUM 125 MCG: 0.12 TABLET ORAL at 06:34

## 2017-12-20 RX ADMIN — HYDROMORPHONE HYDROCHLORIDE 4 MG: 2 TABLET ORAL at 21:37

## 2017-12-20 RX ADMIN — ATORVASTATIN CALCIUM 10 MG: 10 TABLET, FILM COATED ORAL at 21:52

## 2017-12-20 RX ADMIN — ENOXAPARIN SODIUM 40 MG: 40 INJECTION SUBCUTANEOUS at 08:49

## 2017-12-20 RX ADMIN — HYDROMORPHONE HYDROCHLORIDE 4 MG: 2 TABLET ORAL at 15:19

## 2017-12-20 RX ADMIN — SERTRALINE HYDROCHLORIDE 100 MG: 100 TABLET, FILM COATED ORAL at 08:51

## 2017-12-20 RX ADMIN — PROBENECID 500 MG: 500 TABLET, FILM COATED ORAL at 08:52

## 2017-12-20 RX ADMIN — HYDROCHLOROTHIAZIDE 25 MG: 25 TABLET ORAL at 08:52

## 2017-12-20 NOTE — PLAN OF CARE
Problem: Patient Care Overview (Adult)  Goal: Plan of Care Review  Outcome: Ongoing (interventions implemented as appropriate)   12/20/17 1511   Coping/Psychosocial Response Interventions   Plan Of Care Reviewed With patient   Patient Care Overview   Progress progress towards functional goals is fair   Outcome Evaluation   Outcome Summary/Follow up Plan patient demonstrates significant improvement in supine to sit tsf and with static siting balance today. patient with limited ROM in neil knees, not able to stand while maintaining NWB on LLE.

## 2017-12-20 NOTE — PLAN OF CARE
Problem: Patient Care Overview (Adult)  Goal: Plan of Care Review  Outcome: Ongoing (interventions implemented as appropriate)   12/20/17 0332   Coping/Psychosocial Response Interventions   Plan Of Care Reviewed With patient   Patient Care Overview   Progress progress toward functional goals as expected   Outcome Evaluation   Outcome Summary/Follow up Plan Pt is a post op day 2 of a left ORIF of the tib/fib. Dressing is clean and intact. Pt continues with the cast and ace wrap from the toes to below the knees. Pt is able to wiggle his toes. Pt is NWB to the LLE. PT has not been able to accomplish this task and is now recommended to use the Sophie lift for any transfers. Pt has been using the bedpan and urinal. Pt educated on importance of monitoring oxgen saturation levels related to comorbidity of sleep apnea.Pt verbalized understanding. Pt is in the process of finding a rehab that he and his family will be happy with. Will continue to monitor.

## 2017-12-20 NOTE — PLAN OF CARE
Problem: NPPV/CPAP (Adult)  Goal: Signs and Symptoms of Listed Potential Problems Will be Absent or Manageable (NPPV/CPAP)  Outcome: Ongoing (interventions implemented as appropriate)

## 2017-12-20 NOTE — PROGRESS NOTES
Progress Note  Ariel Betancourt MD    Patient ID:  Name:  Nicanor Haley  MRN:  [id number]  1950  67 y.o.  male            CC/Reason for visit:  Left leg fracture  Subjective:  Better,still no BM    Vitals:  Vitals:    12/20/17 0300 12/20/17 0700 12/20/17 1100 12/20/17 1500   BP: 123/72 115/67 105/57 120/63   BP Location: Right arm Right arm Right arm Right arm   Patient Position:       Pulse: 77 68 75 86   Resp: 16 16 16 16   Temp: 96.8 °F (36 °C) 98 °F (36.7 °C) 98.6 °F (37 °C) 98.7 °F (37.1 °C)   TempSrc:  Oral Oral Oral   SpO2: 98% 99% 96% 98%   Weight:       Height:               Body mass index is 47.11 kg/(m^2).    Intake/Output Summary (Last 24 hours) at 12/20/17 1746  Last data filed at 12/20/17 1300   Gross per 24 hour   Intake              480 ml   Output              850 ml   Net             -370 ml       Exam:  GEN:  No distress, appears stated age  EYES:   EOM-i, anicteric sclera bilat  ENT:    External ears/nose normal, OP clear  NECK:  Supple, midline trachea  LUNGS: Clear breath sounds bilat, nonlabored breathing  CV:  Normal S1S2, without murmur, no edema  ABD:  Non tender, no enlarged liver or masses  EXT:  No cyanosis or clubbing    Scheduled meds:    atorvastatin 10 mg Oral Daily   enoxaparin 40 mg Subcutaneous Daily   hydrochlorothiazide 25 mg Oral Daily   isosorbide mononitrate 30 mg Oral Daily   levothyroxine 125 mcg Oral Q AM   metoprolol tartrate 25 mg Oral Q12H   polyethylene glycol 17 g Oral Daily   probenecid 500 mg Oral BID   sertraline 100 mg Oral Daily   traZODone 150 mg Oral Nightly     IV meds:                           Data Review:   I reviewed the patient's medications and new clinical results.  Lab Results   Component Value Date    CALCIUM 8.6 12/19/2017       Results from last 7 days  Lab Units 12/19/17  0357 12/17/17  1620 12/17/17  1026   SODIUM mmol/L 136  --  142   POTASSIUM mmol/L 3.3*  --  3.4*   CHLORIDE mmol/L 97*  --  99   CO2 mmol/L 29.1*  --  31.6*   BUN mg/dL 17  --   17   CREATININE mg/dL 0.80  --  1.06   CALCIUM mg/dL 8.6  --  9.8   BILIRUBIN mg/dL 1.0  --  0.6   ALK PHOS U/L 36*  --  61   ALT (SGPT) U/L 13  --  15   AST (SGOT) U/L 20  --  16   GLUCOSE mg/dL 114*  --  103*   WBC 10*3/mm3  --   --  4.10*   HEMOGLOBIN g/dL  --   --  14.5   PLATELETS 10*3/mm3  --   --  138*   INR   --  1.07  --                        Estimated Creatinine Clearance: 126.7 mL/min (by C-G formula based on Cr of 0.8).    WEIGHTS:     Wt Readings from Last 1 Encounters:   12/17/17 2328 (!) 145 kg (319 lb)   12/17/17 1004 136 kg (300 lb)         ASSESSMENT:   Principal Problem:    Closed left tibial fracture  Active Problems:    Closed bimalleolar fracture of left ankle    Left tibial fracture    Hypokalemia    PLAN:  Pain control  Add Colace to Miralax in am  PT to continue  D/C plannong  Replace potassium        Ariel Betancourt MD  12/20/2017

## 2017-12-20 NOTE — THERAPY TREATMENT NOTE
Acute Care - Physical Therapy Treatment Note  [facility]     Patient Name: Nicanor Haley  : 1950  MRN: [id number]  Today's Date: 2017  Onset of Illness/Injury or Date of Surgery Date: 17  Date of Referral to PT: 17  Referring Physician: Lai Muro    Admit Date: 2017    Visit Dx:    ICD-10-CM ICD-9-CM   1. Fall, initial encounter W19.XXXA E888.9   2. Fracture tibia/fibula, left, closed, initial encounter S82.202A 823.82    S82.402A    3. Contusion of scalp, initial encounter S00.03XA 920   4. Rule Out Dislocation of left patella, initial encounter S83.005A 836.3   5. Muscle weakness (generalized) M62.81 728.87     Patient Active Problem List   Diagnosis   • History of colon polyps   • Acute pancreatitis due to calculus of common bile duct   • Abdominal pain, generalized   • Closed left tibial fracture   • Closed bimalleolar fracture of left ankle               Adult Rehabilitation Note       17 1507 17 1647       Rehab Assessment/Intervention    Discipline physical therapist  -EM physical therapist  -AR     Document Type therapy note (daily note)  -EM therapy note (daily note)  -AR     Subjective Information agree to therapy;no complaints  -EM agree to therapy  -AR     Patient Effort, Rehab Treatment adequate  -EM adequate  -AR     Precautions/Limitations non-weight bearing status  -EM fall precautions   NWB LLE  -AR     Recorded by [EM] Dori Medrano, PT [AR] Agnes Mcelroy PT     Pain Assessment    Pain Assessment No/denies pain  -EM No/denies pain  -AR     Recorded by [EM] Dori Medrano, PT [AR] Agnes Mcelroy PT     Cognitive Assessment/Intervention    Current Cognitive/Communication Assessment  impaired  -AR     Orientation Status  oriented to;person;place  -AR     Follows Commands/Answers Questions  100% of the time;able to follow single-step instructions;needs cueing;needs increased time;needs repetition  -AR     Recorded by  [AR] Agnes  "Navi PT     Bed Mobility, Assessment/Treatment    Bed Mobility, Assistive Device  bed rails;head of bed elevated;draw sheet  -AR     Bed Mob, Supine to Sit, [place] contact guard assist  -EM maximum assist (25% patient effort)  -AR     Bed Mob, Sit to Supine, [place] contact guard assist  -EM maximum assist (25% patient effort)  -AR     Bed Mobility, Comment seated side scoot up to head of bed x3, using BR, cues for NWB on LLE  -EM sitting balance w/ min to mod A  -AR     Recorded by [EM] Dori Medrano, PT [AR] Agnes Mcelroy PT     Transfer Assessment/Treatment    Transfers, Bed-Chair [place]  not appropriate to assess   currently recommend t/f via julissa - RN notified   -AR     Transfers, Sit-Stand [place]  not appropriate to assess  -AR     Transfer, Comment pt states he usually \"rocks\" 3 times to propel himself to standing, unable to maintain NWB even while \"rocking\" so sit to stand tsf deferred   -EM uanble to maintain NWB in sitting, poor sitting balance not safe to stand today  -AR     Recorded by [EM] Dori Medrano, PT [AR] Agnes Mcelroy PT     Gait Assessment/Treatment    Gait, [place] Level  not appropriate to assess  -AR     Recorded by  [AR] Agnes Mcelroy PT     Balance Skills Training    Sitting-Level of Assistance Close supervision  -EM      Sitting-Balance Support Feet supported  -EM      Recorded by [EM] Dori Medrano, PT      Therapy Exercises    Bilateral Lower Extremities AROM:;AAROM:;10 reps;ankle pumps/circles;heel slides;hip abduction/adduction;LAQ;SLR  -EM 10 reps   LAQ w/ assist   -AR     Recorded by [EM] Dori Medrano, PT [AR] Agnes Mcelroy PT     Positioning and Restraints    Pre-Treatment Position in bed  -EM in bed  -AR     Post Treatment Position bed  -EM bed  -AR     In Bed supine;call light within reach;notified nsg  -EM notified nsg;supine;call light within reach;encouraged to call for assist;exit alarm on  -AR     " Recorded by [EM] Dori Medrano, PT [AR] Agnes Mcelroy, PT       User Key  (r) = Recorded By, (t) = Taken By, (c) = Cosigned By    Initials Name Effective Dates    EM Dori Medrano, PT 12/01/15 -     AR Agnes Mcelroy, PT 06/27/16 -                 IP PT Goals       12/18/17 1056          Bed Mobility PT LTG    Bed Mobility PT LTG, Date Established 12/18/17  -MS      Bed Mobility PT LTG, Time to Achieve 5 - 7 days  -MS      Bed Mobility PT LTG, Activity Type all bed mobility  -MS      Bed Mobility PT LTG, [place] Level contact guard assist  -MS      Transfer Training PT LTG    Transfer Training PT LTG, Date Established 12/18/17  -MS      Transfer Training PT LTG, Time to Achieve 5 - 7 days  -MS      Transfer Training PT LTG, Activity Type sit to stand/stand to sit  -MS      Transfer Training PT LTG, [place] Level minimum assist (75% patient effort);2 person assist required  -MS      Transfer Training PT LTG, Assist Device walker, rolling  -MS      Transfer Training PT LTG, Additional Goal Maintaining NWB Left L.E.  -MS      Transfer Training 2 PT LTG    Transfer Training PT 2 LTG, Date Established 12/18/17  -MS      Transfer Training PT 2 LTG, Time to Achieve 5 - 7 days  -MS      Transfer Training PT 2 LTG, Activity Type bed to chair /chair to bed  -MS      Transfer Training PT 2 LTG, [place] Level minimum assist (75% patient effort);2 person assist required  -MS      Transfer Training PT 2 LTG, Assist Device walker, rolling  -MS      Transfer Training PT 2 LTG, Additional Goal Maintaining NWB Left L.E.  -MS        User Key  (r) = Recorded By, (t) = Taken By, (c) = Cosigned By    Initials Name Provider Type    MS Elvis Tejeda, PT Physical Therapist          Physical Therapy Education     Title: PT OT SLP Therapies (Active)     Topic: Physical Therapy (Active)     Point: Mobility training (Active)    Learning Progress Summary    Learner Readiness Method Response Comment Documented by  Status   Patient Acceptance E NR  EM 12/20/17 1511 Active    Acceptance E NR  AR 12/19/17 1649 Active    Acceptance E,D VU,NR  MS 12/18/17 1056 Done               Point: Home exercise program (Active)    Learning Progress Summary    Learner Readiness Method Response Comment Documented by Status   Patient Acceptance E NR  EM 12/20/17 1511 Active    Acceptance E NR  AR 12/19/17 1649 Active    Acceptance E,D VU,NR  MS 12/18/17 1056 Done               Point: Body mechanics (Active)    Learning Progress Summary    Learner Readiness Method Response Comment Documented by Status   Patient Acceptance E NR  AR 12/19/17 1649 Active    Acceptance E,D VU,NR  MS 12/18/17 1056 Done               Point: Precautions (Active)    Learning Progress Summary    Learner Readiness Method Response Comment Documented by Status   Patient Acceptance E NR  AR 12/19/17 1649 Active    Acceptance E,D VU,NR  MS 12/18/17 1056 Done                      User Key     Initials Effective Dates Name Provider Type Discipline    EM 12/01/15 -  Dori Medrano, PT Physical Therapist PT    MS 12/01/15 -  Elvis Tejeda, PT Physical Therapist PT    AR 06/27/16 -  Agnes Mcelroy, PT Physical Therapist PT                    PT Recommendation and Plan  Anticipated Discharge Disposition: skilled nursing facility  Planned Therapy Interventions: balance training, bed mobility training, gait training, home exercise program, patient/family education, postural re-education, strengthening, transfer training  PT Frequency: daily  Plan of Care Review  Plan Of Care Reviewed With: patient  Progress: progress towards functional goals is fair  Outcome Summary/Follow up Plan: patient demonstrates significant improvement in supine to sit tsf and with static siting balance today. patient with limited ROM in neil knees, not able to stand while maintaining NWB on LLE.           Outcome Measures       12/20/17 1500 12/19/17 1600 12/18/17 1000    How much help from another  person do you currently need...    Turning from your back to your side while in flat bed without using bedrails? 3  -EM 2  -AR 3  -MS    Moving from lying on back to sitting on the side of a flat bed without bedrails? 3  -EM 2  -AR 2  -MS    Moving to and from a bed to a chair (including a wheelchair)? 1  -EM 1  -AR 1  -MS    Standing up from a chair using your arms (e.g., wheelchair, bedside chair)? 1  -EM 1  -AR 1  -MS    Climbing 3-5 steps with a railing? 1  -EM 1  -AR 1  -MS    To walk in hospital room? 1  -EM 1  -AR 1  -MS    AM-PAC 6 Clicks Score 10  -EM 8  -AR 9  -MS    Functional Assessment    Outcome Measure Options   AM-PAC 6 Clicks Basic Mobility (PT)  -MS      User Key  (r) = Recorded By, (t) = Taken By, (c) = Cosigned By    Initials Name Provider Type    EM Dori Medrano, PT Physical Therapist    MS Elvis Tejeda, PT Physical Therapist    AR Agnes Mcelroy, PT Physical Therapist           Time Calculation:         PT Charges       12/20/17 1513          Time Calculation    Start Time 1444  -EM      Stop Time 1500  -EM      Time Calculation (min) 16 min  -EM      PT Received On 12/20/17  -EM      PT - Next Appointment 12/21/17  -EM        User Key  (r) = Recorded By, (t) = Taken By, (c) = Cosigned By    Initials Name Provider Type    EM Dori Medrano, PT Physical Therapist          Therapy Charges for Today     Code Description Service Date Service Provider Modifiers Qty    23671905437 HC PT THER PROC EA 15 MIN 12/20/2017 oDri Medrano, PT GP 1    14708412683 HC PT THER SUPP EA 15 MIN 12/20/2017 Dori Medrano, PT GP 1          PT G-Codes  Outcome Measure Options: AM-PAC 6 Clicks Basic Mobility (PT)    Dori Medrano, PT  12/20/2017

## 2017-12-20 NOTE — PLAN OF CARE
Problem: Patient Care Overview (Adult)  Goal: Plan of Care Review  Outcome: Ongoing (interventions implemented as appropriate)   12/20/17 1511 12/20/17 1537   Coping/Psychosocial Response Interventions   Plan Of Care Reviewed With patient --    Patient Care Overview   Progress --  no change   Outcome Evaluation   Outcome Summary/Follow up Plan --  pt working with therapy. pt still is unable to get out of bed. pain is controlled wtih PO pain medication. vss, dressing c/d/i. nvi. pt demonstrated use of IS. educated pt on the importance of blood pressure monitoring and compliance with medications r/t h/o htn; healthy diet and medications as prescribed r/t h/o hyperlipidemia; and the use of O2 and c-pap with a h/o sleep apnea. will cont. to monitor. 1 time dose of K+ given for level 3.3      Goal: Adult Individualization and Mutuality  Outcome: Ongoing (interventions implemented as appropriate)    Goal: Discharge Needs Assessment  Outcome: Ongoing (interventions implemented as appropriate)      Problem: Fall Risk (Adult)  Goal: Identify Related Risk Factors and Signs and Symptoms  Outcome: Ongoing (interventions implemented as appropriate)    Goal: Absence of Falls  Outcome: Ongoing (interventions implemented as appropriate)      Problem: Pain, Acute (Adult)  Goal: Identify Related Risk Factors and Signs and Symptoms  Outcome: Ongoing (interventions implemented as appropriate)    Goal: Acceptable Pain Control/Comfort Level  Outcome: Ongoing (interventions implemented as appropriate)      Problem: NPPV/CPAP (Adult)  Goal: Signs and Symptoms of Listed Potential Problems Will be Absent or Manageable (NPPV/CPAP)  Outcome: Ongoing (interventions implemented as appropriate)      Problem: Pressure Ulcer Risk (Alton Scale) (Adult,Obstetrics,Pediatric)  Goal: Identify Related Risk Factors and Signs and Symptoms  Outcome: Ongoing (interventions implemented as appropriate)    Goal: Skin Integrity  Outcome: Ongoing (interventions  implemented as appropriate)

## 2017-12-21 PROCEDURE — 25010000002 ENOXAPARIN PER 10 MG: Performed by: ORTHOPAEDIC SURGERY

## 2017-12-21 PROCEDURE — 97110 THERAPEUTIC EXERCISES: CPT

## 2017-12-21 PROCEDURE — 94799 UNLISTED PULMONARY SVC/PX: CPT

## 2017-12-21 RX ADMIN — PROBENECID 500 MG: 500 TABLET, FILM COATED ORAL at 09:37

## 2017-12-21 RX ADMIN — ISOSORBIDE MONONITRATE 30 MG: 30 TABLET ORAL at 09:36

## 2017-12-21 RX ADMIN — ATORVASTATIN CALCIUM 10 MG: 10 TABLET, FILM COATED ORAL at 09:36

## 2017-12-21 RX ADMIN — HYDROMORPHONE HYDROCHLORIDE 4 MG: 2 TABLET ORAL at 09:32

## 2017-12-21 RX ADMIN — HYDROMORPHONE HYDROCHLORIDE 4 MG: 2 TABLET ORAL at 05:46

## 2017-12-21 RX ADMIN — LEVOTHYROXINE SODIUM 125 MCG: 0.12 TABLET ORAL at 06:39

## 2017-12-21 RX ADMIN — Medication 25 MG: at 21:50

## 2017-12-21 RX ADMIN — TRAZODONE HYDROCHLORIDE 150 MG: 150 TABLET, FILM COATED ORAL at 21:50

## 2017-12-21 RX ADMIN — Medication 25 MG: at 09:35

## 2017-12-21 RX ADMIN — HYDROCHLOROTHIAZIDE 25 MG: 25 TABLET ORAL at 09:35

## 2017-12-21 RX ADMIN — ENOXAPARIN SODIUM 40 MG: 40 INJECTION SUBCUTANEOUS at 09:34

## 2017-12-21 RX ADMIN — HYDROMORPHONE HYDROCHLORIDE 4 MG: 2 TABLET ORAL at 13:37

## 2017-12-21 RX ADMIN — SERTRALINE HYDROCHLORIDE 100 MG: 100 TABLET, FILM COATED ORAL at 09:35

## 2017-12-21 RX ADMIN — PROBENECID 500 MG: 500 TABLET, FILM COATED ORAL at 19:10

## 2017-12-21 RX ADMIN — HYDROMORPHONE HYDROCHLORIDE 4 MG: 2 TABLET ORAL at 19:11

## 2017-12-21 NOTE — PROGRESS NOTES
Progress Note  Ariel Betancourt MD    Patient ID:  Name:  Nicanor Haley  MRN:  [id number]  1950  67 y.o.  male            CC/Reason for visit:closed tibial fracture    Subjective: better,    Vitals:  Vitals:    12/21/17 0300 12/21/17 0753 12/21/17 1124 12/21/17 1547   BP: 135/71 118/61 115/65 131/69   BP Location: Right arm Right arm Right arm Right arm   Patient Position:  Sitting Lying Lying   Pulse: 73 81 77 78   Resp: 16 16 16 16   Temp: 99.8 °F (37.7 °C) 98.5 °F (36.9 °C) 99.8 °F (37.7 °C) 99.2 °F (37.3 °C)   TempSrc: Oral Oral Oral Oral   SpO2: 99% 94% 97% 91%   Weight:       Height:               Body mass index is 47.11 kg/(m^2).    Intake/Output Summary (Last 24 hours) at 12/21/17 1811  Last data filed at 12/21/17 1807   Gross per 24 hour   Intake             1080 ml   Output             1200 ml   Net             -120 ml       Exam:  GEN:  No distress, appears stated age  EYES:   EOM-i, anicteric sclera bilat  ENT:    External ears/nose normal, OP clear  NECK:  Supple, midline trachea  LUNGS: Clear breath sounds bilat, nonlabored breathing  CV:  Normal S1S2, without murmur, no edema  ABD:  Non tender, no enlarged liver or masses  EXT:  No cyanosis or clubbing    Scheduled meds:    atorvastatin 10 mg Oral Daily   docusate sodium 100 mg Oral Daily   enoxaparin 40 mg Subcutaneous Daily   hydrochlorothiazide 25 mg Oral Daily   isosorbide mononitrate 30 mg Oral Daily   levothyroxine 125 mcg Oral Q AM   metoprolol tartrate 25 mg Oral Q12H   polyethylene glycol 17 g Oral Daily   probenecid 500 mg Oral BID   sertraline 100 mg Oral Daily   traZODone 150 mg Oral Nightly     IV meds:                           Data Review:   I reviewed the patient's medications and new clinical results.  Lab Results   Component Value Date    CALCIUM 8.6 12/19/2017       Results from last 7 days  Lab Units 12/19/17  0357 12/17/17  1620 12/17/17  1026   SODIUM mmol/L 136  --  142   POTASSIUM mmol/L 3.3*  --  3.4*   CHLORIDE mmol/L 97*   --  99   CO2 mmol/L 29.1*  --  31.6*   BUN mg/dL 17  --  17   CREATININE mg/dL 0.80  --  1.06   CALCIUM mg/dL 8.6  --  9.8   BILIRUBIN mg/dL 1.0  --  0.6   ALK PHOS U/L 36*  --  61   ALT (SGPT) U/L 13  --  15   AST (SGOT) U/L 20  --  16   GLUCOSE mg/dL 114*  --  103*   WBC 10*3/mm3  --   --  4.10*   HEMOGLOBIN g/dL  --   --  14.5   PLATELETS 10*3/mm3  --   --  138*   INR   --  1.07  --                        Estimated Creatinine Clearance: 126.7 mL/min (by C-G formula based on Cr of 0.8).    WEIGHTS:     Wt Readings from Last 1 Encounters:   12/17/17 2328 (!) 145 kg (319 lb)   12/17/17 1004 136 kg (300 lb)         ASSESSMENT:   Principal Problem:    Closed left tibial fracture  Active Problems:    Closed bimalleolar fracture of left ankle    Left tibial fracture      PLAN:  Pain control  Cont with Colace to Miralax   PT to continue  D/C plannong  Replace potassium           Ariel Betancourt MD  12/21/2017

## 2017-12-21 NOTE — PLAN OF CARE
Problem: Patient Care Overview (Adult)  Goal: Plan of Care Review  Outcome: Ongoing (interventions implemented as appropriate)   12/21/17 0112   Coping/Psychosocial Response Interventions   Plan Of Care Reviewed With patient   Patient Care Overview   Progress progress toward functional goals as expected   Outcome Evaluation   Outcome Summary/Follow up Plan Pt is a post op day 3 of an ORIF of the left tib/fib.Pt continues with the ACE wrap and cast in place. Dressing is clean dry and intact. Pt continues to refuse to get out of bed. Pt continues with PO pain meds that provide relief. Pt educated on importance of monitoring oxygen saturation related to comorbidity of sleep apnea. Pt verbalized understanding. Pt using the incentive spirometer effectively. Pt is possibly going to Rehab in [place]. Will continue to monitor.     Goal: Adult Individualization and Mutuality  Outcome: Ongoing (interventions implemented as appropriate)    Goal: Discharge Needs Assessment  Outcome: Ongoing (interventions implemented as appropriate)      Problem: Fall Risk (Adult)  Goal: Identify Related Risk Factors and Signs and Symptoms  Outcome: Ongoing (interventions implemented as appropriate)    Goal: Absence of Falls  Outcome: Ongoing (interventions implemented as appropriate)      Problem: Pain, Acute (Adult)  Goal: Identify Related Risk Factors and Signs and Symptoms  Outcome: Ongoing (interventions implemented as appropriate)    Goal: Acceptable Pain Control/Comfort Level  Outcome: Ongoing (interventions implemented as appropriate)      Problem: NPPV/CPAP (Adult)  Goal: Signs and Symptoms of Listed Potential Problems Will be Absent or Manageable (NPPV/CPAP)  Outcome: Ongoing (interventions implemented as appropriate)      Problem: Pressure Ulcer Risk (Alton Scale) (Adult,Obstetrics,Pediatric)  Goal: Identify Related Risk Factors and Signs and Symptoms  Outcome: Ongoing (interventions implemented as appropriate)    Goal: Skin  Integrity  Outcome: Ongoing (interventions implemented as appropriate)

## 2017-12-22 LAB — PLATELET # BLD AUTO: 158 10*3/MM3 (ref 140–500)

## 2017-12-22 PROCEDURE — 97110 THERAPEUTIC EXERCISES: CPT

## 2017-12-22 PROCEDURE — 85049 AUTOMATED PLATELET COUNT: CPT | Performed by: SPECIALIST

## 2017-12-22 PROCEDURE — 25010000002 ENOXAPARIN PER 10 MG: Performed by: ORTHOPAEDIC SURGERY

## 2017-12-22 RX ORDER — LEVOTHYROXINE SODIUM 0.12 MG/1
125 TABLET ORAL DAILY
Start: 2017-12-22 | End: 2018-10-20 | Stop reason: HOSPADM

## 2017-12-22 RX ORDER — PSEUDOEPHEDRINE HCL 30 MG
100 TABLET ORAL DAILY
Start: 2017-12-23 | End: 2019-04-15

## 2017-12-22 RX ORDER — POLYETHYLENE GLYCOL 3350 17 G/17G
17 POWDER, FOR SOLUTION ORAL DAILY
Start: 2017-12-23 | End: 2019-04-15

## 2017-12-22 RX ADMIN — ISOSORBIDE MONONITRATE 30 MG: 30 TABLET ORAL at 10:30

## 2017-12-22 RX ADMIN — PROBENECID 500 MG: 500 TABLET, FILM COATED ORAL at 17:52

## 2017-12-22 RX ADMIN — HYDROMORPHONE HYDROCHLORIDE 4 MG: 2 TABLET ORAL at 17:52

## 2017-12-22 RX ADMIN — ENOXAPARIN SODIUM 40 MG: 40 INJECTION SUBCUTANEOUS at 10:29

## 2017-12-22 RX ADMIN — SERTRALINE HYDROCHLORIDE 100 MG: 100 TABLET, FILM COATED ORAL at 10:31

## 2017-12-22 RX ADMIN — DOCUSATE SODIUM 100 MG: 100 CAPSULE, LIQUID FILLED ORAL at 10:29

## 2017-12-22 RX ADMIN — HYDROMORPHONE HYDROCHLORIDE 4 MG: 2 TABLET ORAL at 14:58

## 2017-12-22 RX ADMIN — POLYETHYLENE GLYCOL (3350) 17 G: 17 POWDER, FOR SOLUTION ORAL at 10:28

## 2017-12-22 RX ADMIN — HYDROMORPHONE HYDROCHLORIDE 4 MG: 2 TABLET ORAL at 11:35

## 2017-12-22 RX ADMIN — HYDROMORPHONE HYDROCHLORIDE 4 MG: 2 TABLET ORAL at 21:35

## 2017-12-22 RX ADMIN — Medication 25 MG: at 10:30

## 2017-12-22 RX ADMIN — PROBENECID 500 MG: 500 TABLET, FILM COATED ORAL at 10:31

## 2017-12-22 RX ADMIN — HYDROCHLOROTHIAZIDE 25 MG: 25 TABLET ORAL at 10:30

## 2017-12-22 RX ADMIN — Medication 25 MG: at 21:36

## 2017-12-22 RX ADMIN — ATORVASTATIN CALCIUM 10 MG: 10 TABLET, FILM COATED ORAL at 10:29

## 2017-12-22 RX ADMIN — HYDROMORPHONE HYDROCHLORIDE 4 MG: 2 TABLET ORAL at 08:47

## 2017-12-22 RX ADMIN — LEVOTHYROXINE SODIUM 125 MCG: 0.12 TABLET ORAL at 06:17

## 2017-12-22 RX ADMIN — HYDROMORPHONE HYDROCHLORIDE 4 MG: 2 TABLET ORAL at 02:50

## 2017-12-22 RX ADMIN — TRAZODONE HYDROCHLORIDE 150 MG: 150 TABLET, FILM COATED ORAL at 21:37

## 2017-12-22 NOTE — PLAN OF CARE
Problem: Patient Care Overview (Adult)  Goal: Plan of Care Review  Outcome: Ongoing (interventions implemented as appropriate)   12/21/17 3626   Coping/Psychosocial Response Interventions   Plan Of Care Reviewed With patient;spouse   Patient Care Overview   Progress improving   Outcome Evaluation   Outcome Summary/Follow up Plan giovana tfer to chair, but alerted RN, he made need to be a [facility] lift back to bed once fatigued

## 2017-12-22 NOTE — PLAN OF CARE
Problem: Patient Care Overview (Adult)  Goal: Plan of Care Review  Outcome: Ongoing (interventions implemented as appropriate)   12/22/17 1505   Coping/Psychosocial Response Interventions   Plan Of Care Reviewed With patient   Outcome Evaluation   Outcome Summary/Follow up Plan Pt pivoted x3 back to bed. Difficulty standing w/ use of walker. Did well keeping weight off of LLE during standing

## 2017-12-22 NOTE — DISCHARGE SUMMARY
Discharge Summary  Ariel Betancourt MD     NAME: Nicanor Haley ADMIT: 2017   : 1950  PCP: Ariel Betancourt MD    MRN: [id number] LOS: 5 days   AGE/SEX: 67 y.o. male  ROOM: P886/1     Date of Admission:  2017  Date of Discharge:  2017    PCP: Ariel Betancourt MD    CHIEF COMPLAINT  Leg Pain (fall, left lower leg pain)      DISCHARGE DIAGNOSIS  Active Hospital Problems (** Indicates Principal Problem)    Diagnosis Date Noted   • **Closed left tibial fracture [S82.202A] 2017   • Left tibial fracture [S82.202A] 2017   • Closed bimalleolar fracture of left ankle [S82.842A] 2017      Resolved Hospital Problems    Diagnosis Date Noted Date Resolved   • Fall [W19.XXXA] 2017       SECONDARY DIAGNOSES  Past Medical History:   Diagnosis Date   • Arthritis    • Colon cancer     tubulovillous adenomas with adenocarcinoma in situ s/p right open colectomy   • Colon polyps 2014    1) cecal polyps: fragments of adenomatous polyp with low grade-dysplasia 2) ascending colon mass bx: fragments of tubulovillous adenoma w/ low grade dysplasia 3) transverse colon polyp: fragments of adenomatous polyp with low grade dysplasia 4) sigmoid polyp: fragments of adenomatous polyp with low grade dysplasia   • Gout    • Hyperlipemia    • Hypertension    • Hypothyroidism    • Sleep apnea        CONSULTS   Consults     Date and Time Order Name Status Description    2017 1123 Ortho (on-call MD unless specified) Completed     2017 1123 Family Medicine Consult Completed           PROCEDURES PERFORMED  Imaging Results (last 72 hours)     Procedure Component Value Units Date/Time    XR Tibia Fibula 2 View Left [[id number]] Collected:  17     Updated:  17 215    Narrative:       X-RAY TIBIA FIBULA 2 VIEWS LEFT.     HISTORY: Open reduction internal fixation.     COMPARISON: No prior studies for comparison.     FINDINGS:  Under fluoroscopy, fractures of distal tibia and  "fibula have been  reduced by plate and screws, hardware is intact.         Soft tissue swelling and soft tissue defect at the lateral aspect.       Impression:       Transfixation of distal tibia and fibula under fluoroscopy.         This report was finalized on 12/21/2017 9:51 PM by Dr. Roderick Jane MD.               HOSPITAL COURSE  Pt is a 67 y.o. male who presents via EMS to the ED complaining of head pain and LLE pain s/p fall in the bath tub. Pt reported that his knees \"gave out\" causing him to fall backward and strike his head. EMS reported that the pt has a large hematoma to posterior head and no obvious deformity to LLE but had crepitus to the area. Pt reports associated numbness and tingling in LLE. Denies LOC, CP, SOA, neck pain, and back pain. Pt was unable to stand s/p fall. He has a hx of frequent falls      LEFT TIBIA AND FIBULA 4 VIEWS      HISTORY: Posterior medical and pain      COMPARISON: None available      FINDINGS:  1. Spinal fracture of the distal tibial diaphysis and the distal left  fibular diaphyseal metaphyseal junction mild comminution.  2. Avulsion fracture of the navicular anteriorly.  3. 6 mm calcaneal spur.  4. Evidence of patellar dislocation, follow-up lateral imaging of the  knee would be helpful for more accurate assessment and further  evaluation of distal femoral injury.     Orthopedic surgery on ER call consulted.Dr Acevedo.  The patient scheduled for urgent surgery.      Pre-op Diagnosis:   1. Closed left oblique tibial shaft fracture  2.  Closed left bimalleolar ankle fracture      Post-Op Diagnosis Codes:   Same     Procedure:     1.  Open reduction internal fixation, left tibial shaft fracture  2.  Open reduction internal fixation, left bimalleolar ankle fracture     Postoperative recovery went well. Miralax and stool softner added to his   meds regimen daily. Potassium replaced. PT has been working with the patient on the daily basis.  The patient is being transferred to " the [facility] .        PHYSICAL EXAM  Objective     Constitutional: He is oriented to person, place, and time and well-developed, well-nourished, and in no distress.   HENT:   Hematoma to posterior   Eyes: EOM are normal. Pupils are equal, round, and reactive to light.   Neck: Normal range of motion. Neck supple. No spinous process tenderness and no muscular tenderness present.   Cardiovascular: Normal rate, regular rhythm, normal heart sounds and intact distal pulses.    Pulmonary/Chest: Effort normal and breath sounds normal. No respiratory distress.   Abdominal: Soft. There is no tenderness. There is no rebound and no guarding.   Musculoskeletal: Normal range of motion. He exhibits no edema.        Cervical back: He exhibits no tenderness.        Thoracic back: He exhibits no tenderness.        Lumbar back: He exhibits no tenderness.        Left lower leg: Cast,moving toes  Pelvis stable.   Neurological: He is alert and oriented to person, place, and time. He has normal sensation and normal strength.   Skin: Skin is warm, dry and intact.   Psychiatric: Mood and affect normal.   Nursing note and vitals reviewed.             CONDITION ON DISCHARGE  Stable.      DISCHARGE DISPOSITION   Skilled Nursing Facility (DC - External)      DISCHARGE MEDICATIONS   Nicanor Haley   Home Medication Instructions CLAUDIA:[id number]    Printed on:12/22/17 2253   Medication Information                      calcium carbonate-cholecalciferol 500-400 MG-UNIT tablet tablet  Take 1 tablet by mouth 2 (Two) Times a Day.             docusate sodium 100 MG capsule  Take 100 mg by mouth Daily.             hydrochlorothiazide (HYDRODIURIL) 25 MG tablet  Take 25 mg by mouth Daily.             HYDROmorphone (DILAUDID) 4 MG tablet  Take 4 mg by mouth Every 6 (Six) Hours As Needed for Moderate Pain .             isosorbide mononitrate (IMDUR) 30 MG 24 hr tablet  Take 30 mg by mouth Daily.             levothyroxine (SYNTHROID, LEVOTHROID)  125 MCG tablet  Take 1 tablet by mouth Daily.             metoprolol tartrate (LOPRESSOR) 25 MG tablet  Take 25 mg by mouth 2 (Two) Times a Day. Pt unsure of dose             polyethylene glycol (MIRALAX) packet  Take 17 g by mouth Daily.             probenecid (BENEMID) 500 MG tablet  Take 500 mg by mouth 2 (Two) Times a Day.             sertraline (ZOLOFT) 100 MG tablet  Take 100 mg by mouth Daily. Pt unsure of dose             simvastatin (ZOCOR) 40 MG tablet  Take 40 mg by mouth Daily.             traZODone (DESYREL) 150 MG tablet  Take 150 mg by mouth Every Night.                No future appointments.  Additional Instructions for the Follow-ups that You Need to Schedule     Discharge Follow-up with PCP    As directed    Follow Up Details:  dr betancourt after d/c from SNU                 Follow-up Information     Follow up with Roman wong [facility] .    Specialties:  Skilled Nursing Facility, Intermediate Care Facility, Hospice    Contact information:    [address] [place] [id number] [phone]        Follow up with Ariel Betancourt MD .    Specialties:  Internal Medicine, Hospitalist    Why:  dr betancourt after d/c from SNU    Contact information:    [facility] [place] [address]  [phone]            TEST  RESULTS PENDING AT DISCHARGE         Ariel Betancourt MD  12/22/17  5:45 PM

## 2017-12-22 NOTE — PLAN OF CARE
Problem: Patient Care Overview (Adult)  Goal: Plan of Care Review  Outcome: Ongoing (interventions implemented as appropriate)   12/21/17 2004   Coping/Psychosocial Response Interventions   Plan Of Care Reviewed With patient   Patient Care Overview   Progress progress toward functional goals as expected   Outcome Evaluation   Outcome Summary/Follow up Plan Patient VSS, NV stable, voiding, pain rated high (7- 9), but patient after taking med's is usually sleeping and drowsy on awakening. IS independently, NWB but PT got up to chair this evening. DC in am possible if medically stable.      Goal: Adult Individualization and Mutuality  Outcome: Ongoing (interventions implemented as appropriate)    Goal: Discharge Needs Assessment  Outcome: Ongoing (interventions implemented as appropriate)      Problem: Fall Risk (Adult)  Goal: Identify Related Risk Factors and Signs and Symptoms  Outcome: Ongoing (interventions implemented as appropriate)    Goal: Absence of Falls  Outcome: Outcome(s) achieved Date Met: 12/21/17      Problem: Pain, Acute (Adult)  Goal: Identify Related Risk Factors and Signs and Symptoms  Outcome: Ongoing (interventions implemented as appropriate)    Goal: Acceptable Pain Control/Comfort Level  Outcome: Ongoing (interventions implemented as appropriate)      Problem: NPPV/CPAP (Adult)  Goal: Signs and Symptoms of Listed Potential Problems Will be Absent or Manageable (NPPV/CPAP)  Outcome: Ongoing (interventions implemented as appropriate)      Problem: Pressure Ulcer Risk (Alton Scale) (Adult,Obstetrics,Pediatric)  Goal: Identify Related Risk Factors and Signs and Symptoms  Outcome: Ongoing (interventions implemented as appropriate)    Goal: Skin Integrity  Outcome: Ongoing (interventions implemented as appropriate)

## 2017-12-22 NOTE — PLAN OF CARE
Problem: Patient Care Overview (Adult)  Goal: Plan of Care Review  Outcome: Ongoing (interventions implemented as appropriate)   12/22/17 0232   Coping/Psychosocial Response Interventions   Plan Of Care Reviewed With patient   Patient Care Overview   Progress progress toward functional goals as expected   Outcome Evaluation   Outcome Summary/Follow up Plan Pt is a post op day 4 of a left ORIF of the tib/fib. Dressing is clean dry and intact. Pt continues with the ace wrap and cast. Pt was able to get out of the bed today with the assist of the Sophie lift. Pt contiues with PO pain meds that have been controlling his pain better. Pt used the CPAP machines with no problems this shift. Pt educated on the importance of monitoring oxygen saturation related to comorbidity of sleep apnes. Pt verbalized understandng. Pt is possible going to [facility] in AM bades on precert. Will continue to monitor.     Goal: Adult Individualization and Mutuality  Outcome: Ongoing (interventions implemented as appropriate)    Goal: Discharge Needs Assessment  Outcome: Ongoing (interventions implemented as appropriate)      Problem: Fall Risk (Adult)  Goal: Identify Related Risk Factors and Signs and Symptoms  Outcome: Ongoing (interventions implemented as appropriate)      Problem: Pain, Acute (Adult)  Goal: Identify Related Risk Factors and Signs and Symptoms  Outcome: Ongoing (interventions implemented as appropriate)    Goal: Acceptable Pain Control/Comfort Level  Outcome: Ongoing (interventions implemented as appropriate)      Problem: NPPV/CPAP (Adult)  Goal: Signs and Symptoms of Listed Potential Problems Will be Absent or Manageable (NPPV/CPAP)  Outcome: Ongoing (interventions implemented as appropriate)      Problem: Pressure Ulcer Risk (Alton Scale) (Adult,Obstetrics,Pediatric)  Goal: Identify Related Risk Factors and Signs and Symptoms  Outcome: Ongoing (interventions implemented as appropriate)    Goal: Skin Integrity  Outcome:  Ongoing (interventions implemented as appropriate)

## 2017-12-22 NOTE — THERAPY TREATMENT NOTE
Acute Care - Physical Therapy Treatment Note  [facility]     Patient Name: Nicanor Haley  : 1950  MRN: [id number]  Today's Date: 2017  Onset of Illness/Injury or Date of Surgery Date: 17  Date of Referral to PT: 17  Referring Physician: Lai Muro    Admit Date: 2017    Visit Dx:    ICD-10-CM ICD-9-CM   1. Fall, initial encounter W19.XXXA E888.9   2. Fracture tibia/fibula, left, closed, initial encounter S82.202A 823.82    S82.402A    3. Contusion of scalp, initial encounter S00.03XA 920   4. Rule Out Dislocation of left patella, initial encounter S83.005A 836.3   5. Muscle weakness (generalized) M62.81 728.87     Patient Active Problem List   Diagnosis   • History of colon polyps   • Acute pancreatitis due to calculus of common bile duct   • Abdominal pain, generalized   • Closed left tibial fracture   • Closed bimalleolar fracture of left ankle   • Left tibial fracture               Adult Rehabilitation Note       17 1400 17 1735 17 1507    Rehab Assessment/Intervention    Discipline physical therapy assistant  -RW physical therapy assistant  -JM physical therapist  -EM    Document Type therapy note (daily note)  -RW therapy note (daily note)  - therapy note (daily note)  -EM    Subjective Information agree to therapy;complains of;pain  -RW agree to therapy;complains of;weakness;fatigue  - agree to therapy;no complaints  -EM    Patient Effort, Rehab Treatment   adequate  -EM    Precautions/Limitations fall precautions;non-weight bearing status   NWB on LLE  -RW non-weight bearing status  - non-weight bearing status  -EM    Specific Treatment Considerations  NWB left   -JM     Recorded by [RW] Wanda Ryan PTA [JM] Ary Newell PTA [EM] Dori Medrano, PT    Pain Assessment    Pain Assessment 0-10  -RW No/denies pain  -JM No/denies pain  -EM    Pain Score 9  -RW      Pain Type Acute pain  -RW      Pain Location Leg  -RW      Pain  Orientation Left  -RW      Pain Intervention(s) Repositioned;Rest  -RW      Response to Interventions tolerated, unchanged  -RW      Recorded by [RW] Wanda Ryan PTA [JM] Ary Newell PTA [EM] Dori Medrano, PT    Cognitive Assessment/Intervention    Current Cognitive/Communication Assessment functional  -RW      Orientation Status oriented x 4  -RW      Follows Commands/Answers Questions 100% of the time;needs cueing  -RW      Personal Safety mild impairment;at risk behaviors demonstrated  -RW      Personal Safety Interventions fall prevention program maintained;gait belt;nonskid shoes/slippers when out of bed  -RW      Recorded by [RW] Wanda Ryan PTA      Bed Mobility, Assessment/Treatment    Bed Mobility, Assistive Device  bed rails;head of bed elevated  -     Bed Mob, Supine to Sit, Erin not tested   Pt up in chair  -RW minimum assist (75% patient effort)  - contact guard assist  -    Bed Mob, Sit to Supine, Erin moderate assist (50% patient effort);2 person assist required;verbal cues required;nonverbal cues required (demo/gesture)  -  contact guard assist  -EM    Bed Mobility, Safety Issues decreased use of arms for pushing/pulling;decreased use of legs for bridging/pushing  - decreased use of arms for pushing/pulling;decreased use of legs for bridging/pushing;impaired trunk control for bed mobility  -     Bed Mobility, Impairments  strength decreased;impaired balance  -     Bed Mobility, Comment   seated side scoot up to head of bed x3, using BR, cues for NWB on LLE  -EM    Recorded by [RW] Wanda Ryan PTA [JM] Ary Newell PTA [EM] Dori Medrano, PT    Transfer Assessment/Treatment    Transfers, Bed-Chair Erin  2 person assist required;maximum assist (25% patient effort)  -     Transfers, Chair-Bed Erin maximum assist (25% patient effort);verbal cues required;nonverbal cues required (demo/gesture)   x3 2 on both side of pt  "and 1 behind to pivot hips  -RW      Transfers, Sit-Stand Dare maximum assist (25% patient effort);2 person assist required;verbal cues required;nonverbal cues required (demo/gesture)  -RW maximum assist (25% patient effort);2 person assist required  -     Transfers, Stand-Sit Dare maximum assist (25% patient effort);2 person assist required;verbal cues required;nonverbal cues required (demo/gesture)  -RW 2 person assist required;maximum assist (25% patient effort)  -JM     Transfers, Sit-Stand-Sit, Assist Device --   unable to stand w/ use of RW  -RW      Transfer, Safety Issues weight-shifting ability decreased;loses balance backward  -RW      Transfer, Impairments strength decreased;impaired balance  -RW      Transfer, Comment Attempted to stand x2. Unable to stand w/ walker. Did better keeping weight off ofLLE  -RW stood once in bariatric wx, but req seated rest; unable to fully  wx so perf HHA pivot tfer to chair, exit to rt    chair at foot of bed  - pt states he usually \"rocks\" 3 times to propel himself to standing, unable to maintain NWB even while \"rocking\" so sit to stand tsf deferred   -EM    Recorded by [RW] Wanda Ryan PTA [JM] Ary Newell PTA [EM] Dori Medrano, PT    Gait Assessment/Treatment    Gait, Dare Level not appropriate to assess  -RW      Recorded by [RW] Wanda Ryan PTA      Balance Skills Training    Sitting-Level of Assistance   Close supervision  -EM    Sitting-Balance Support   Feet supported  -EM    Recorded by   [EM] Dori Medrano, CAIO    Therapy Exercises    Bilateral Lower Extremities   AROM:;AAROM:;10 reps;ankle pumps/circles;heel slides;hip abduction/adduction;LAQ;SLR  -EM    Recorded by   [EM] Dori Medrano PT    Positioning and Restraints    Pre-Treatment Position sitting in chair/recliner  -RW in bed  -JM in bed  -EM    Post Treatment Position bed  -RW chair  - bed  -EM    In Bed fowlers;call light within " reach;encouraged to call for assist;exit alarm on  -RW  supine;call light within reach;notified nsg  -EM    In Chair  reclined;call light within reach;encouraged to call for assist;with family/caregiver;LLE elevated  -JM     Recorded by [RW] Wanda Ryan, PTA [JM] Ary Newell PTA [EM] Dori Medrano, PT      12/19/17 5795          Rehab Assessment/Intervention    Discipline physical therapist  -AR      Document Type therapy note (daily note)  -AR      Subjective Information agree to therapy  -AR      Patient Effort, Rehab Treatment adequate  -AR      Precautions/Limitations fall precautions   NWB LLE  -AR      Recorded by [AR] Agnes Mcelroy, PT      Pain Assessment    Pain Assessment No/denies pain  -AR      Recorded by [AR] Agnes Mcelroy PT      Cognitive Assessment/Intervention    Current Cognitive/Communication Assessment impaired  -AR      Orientation Status oriented to;person;place  -AR      Follows Commands/Answers Questions 100% of the time;able to follow single-step instructions;needs cueing;needs increased time;needs repetition  -AR      Recorded by [AR] Agnes Mcelroy PT      Bed Mobility, Assessment/Treatment    Bed Mobility, Assistive Device bed rails;head of bed elevated;draw sheet  -AR      Bed Mob, Supine to Sit, [place] maximum assist (25% patient effort)  -AR      Bed Mob, Sit to Supine, [place] maximum assist (25% patient effort)  -AR      Bed Mobility, Comment sitting balance w/ min to mod A  -AR      Recorded by [AR] Agnes Mcelroy PT      Transfer Assessment/Treatment    Transfers, Bed-Chair [place] not appropriate to assess   currently recommend t/f via julissa - JASSI notified   -AR      Transfers, Sit-Stand [place] not appropriate to assess  -AR      Transfer, Comment uanble to maintain NWB in sitting, poor sitting balance not safe to stand today  -AR      Recorded by [AR] Agnes Mcelroy PT      Gait Assessment/Treatment    Gait, [place] Level not  appropriate to assess  -AR      Recorded by [AR] Agnes Mcelroy PT      Therapy Exercises    Bilateral Lower Extremities 10 reps   LAQ w/ assist   -AR      Recorded by [AR] Agnes Mcelroy PT      Positioning and Restraints    Pre-Treatment Position in bed  -AR      Post Treatment Position bed  -AR      In Bed notified nsg;supine;call light within reach;encouraged to call for assist;exit alarm on  -AR      Recorded by [AR] Agnes Mcelroy PT        User Key  (r) = Recorded By, (t) = Taken By, (c) = Cosigned By    Initials Name Effective Dates    EM Dori MODI Marcela, PT 12/01/15 -     ASHLI Newell, PTA 02/18/16 -     AR Agnes Mcelroy, PT 06/27/16 -     ZAHEER Ryan, PTA 04/06/16 -                 IP PT Goals       12/18/17 1056          Bed Mobility PT LTG    Bed Mobility PT LTG, Date Established 12/18/17  -MS      Bed Mobility PT LTG, Time to Achieve 5 - 7 days  -MS      Bed Mobility PT LTG, Activity Type all bed mobility  -MS      Bed Mobility PT LTG, [place] Level contact guard assist  -MS      Transfer Training PT LTG    Transfer Training PT LTG, Date Established 12/18/17  -MS      Transfer Training PT LTG, Time to Achieve 5 - 7 days  -MS      Transfer Training PT LTG, Activity Type sit to stand/stand to sit  -MS      Transfer Training PT LTG, [place] Level minimum assist (75% patient effort);2 person assist required  -MS      Transfer Training PT LTG, Assist Device walker, rolling  -MS      Transfer Training PT LTG, Additional Goal Maintaining NWB Left L.E.  -MS      Transfer Training 2 PT LTG    Transfer Training PT 2 LTG, Date Established 12/18/17  -MS      Transfer Training PT 2 LTG, Time to Achieve 5 - 7 days  -MS      Transfer Training PT 2 LTG, Activity Type bed to chair /chair to bed  -MS      Transfer Training PT 2 LTG, [place] Level minimum assist (75% patient effort);2 person assist required  -MS      Transfer Training PT 2 LTG, Assist Device walker, rolling  -MS       Transfer Training PT 2 LTG, Additional Goal Maintaining NWB Left L.E.  -MS        User Key  (r) = Recorded By, (t) = Taken By, (c) = Cosigned By    Initials Name Provider Type    MS Elvis FRANK Ileana, PT Physical Therapist          Physical Therapy Education     Title: PT OT SLP Therapies (Active)     Topic: Physical Therapy (Active)     Point: Mobility training (Active)    Learning Progress Summary    Learner Readiness Method Response Comment Documented by Status   Patient Acceptance E,TB,D VU,NR   12/22/17 1504 Done    Acceptance E,TB,D NR   12/21/17 1754 Active    Acceptance E NR  EM 12/20/17 1511 Active    Acceptance E NR  AR 12/19/17 1649 Active    Acceptance E,D VU,NR  MS 12/18/17 1056 Done   Family Acceptance E,TB,D NR   12/21/17 1754 Active               Point: Home exercise program (Active)    Learning Progress Summary    Learner Readiness Method Response Comment Documented by Status   Patient Acceptance E,TB,D NR   12/21/17 1754 Active    Acceptance E NR   12/20/17 1511 Active    Acceptance E NR  AR 12/19/17 1649 Active    Acceptance E,D VU,NR  MS 12/18/17 1056 Done   Family Acceptance E,TB,D NR   12/21/17 1754 Active               Point: Body mechanics (Active)    Learning Progress Summary    Learner Readiness Method Response Comment Documented by Status   Patient Acceptance E,TB,D VU,NR   12/22/17 1504 Done    Acceptance E,TB,D NR   12/21/17 1754 Active    Acceptance E NR  AR 12/19/17 1649 Active    Acceptance E,D VU,NR  MS 12/18/17 1056 Done   Family Acceptance E,TB,D NR   12/21/17 1754 Active               Point: Precautions (Active)    Learning Progress Summary    Learner Readiness Method Response Comment Documented by Status   Patient Acceptance E,TB,D VU,NR   12/22/17 1504 Done    Acceptance E,TB,D NR   12/21/17 1754 Active    Acceptance E NR  AR 12/19/17 1649 Active    Acceptance E,D VU,NR  MS 12/18/17 1056 Done   Family Acceptance E,TB,D NR   12/21/17 1754 Active                       User Key     Initials Effective Dates Name Provider Type Discipline    EM 12/01/15 -  Dori Medrano, PT Physical Therapist PT    JM 02/18/16 -  Ary Newell, PTA Physical Therapy Assistant PT    MS 12/01/15 -  Elvis Tejeda, PT Physical Therapist PT    AR 06/27/16 -  Agnes Mcelroy, PT Physical Therapist PT    RW 04/06/16 -  Wanda Ryan, PTA Physical Therapy Assistant PT                    PT Recommendation and Plan  Anticipated Discharge Disposition: skilled nursing facility  Planned Therapy Interventions: balance training, bed mobility training, gait training, home exercise program, patient/family education, postural re-education, strengthening, transfer training  PT Frequency: daily  Plan of Care Review  Plan Of Care Reviewed With: patient  Outcome Summary/Follow up Plan: Pt pivoted x3 back to bed. Difficulty standing w/ use of walker. Did well keeping weight off of LLE during standing          Outcome Measures       12/22/17 1500 12/21/17 1700 12/20/17 1500    How much help from another person do you currently need...    Turning from your back to your side while in flat bed without using bedrails? 3  -RW 3  -JM 3  -EM    Moving from lying on back to sitting on the side of a flat bed without bedrails? 2  -RW 2  -JM 3  -EM    Moving to and from a bed to a chair (including a wheelchair)? 2  -RW 2  -JM 1  -EM    Standing up from a chair using your arms (e.g., wheelchair, bedside chair)? 2  -RW 2  -JM 1  -EM    Climbing 3-5 steps with a railing? 1  -RW 1  -JM 1  -EM    To walk in hospital room? 1  -RW 1  -JM 1  -EM    AM-PAC 6 Clicks Score 11  -RW 11  -JM 10  -EM    Functional Assessment    Outcome Measure Options AM-PAC 6 Clicks Basic Mobility (PT)  -RW        12/19/17 1600          How much help from another person do you currently need...    Turning from your back to your side while in flat bed without using bedrails? 2  -AR      Moving from lying on back to sitting on the side of a  flat bed without bedrails? 2  -AR      Moving to and from a bed to a chair (including a wheelchair)? 1  -AR      Standing up from a chair using your arms (e.g., wheelchair, bedside chair)? 1  -AR      Climbing 3-5 steps with a railing? 1  -AR      To walk in hospital room? 1  -AR      AM-PAC 6 Clicks Score 8  -AR        User Key  (r) = Recorded By, (t) = Taken By, (c) = Cosigned By    Initials Name Provider Type    LAZARO Medrano, PT Physical Therapist    ASHLI Newell, DEBORAH Physical Therapy Assistant    FRANCISCO Mcelroy, PT Physical Therapist    ZAHEER Ryan PTA Physical Therapy Assistant           Time Calculation:         PT Charges       12/22/17 1451          Time Calculation    Start Time 1447  -RW      Stop Time 1505  -RW      Time Calculation (min) 18 min  -RW      PT Received On 12/22/17  -RW      PT - Next Appointment 12/23/17  -        User Key  (r) = Recorded By, (t) = Taken By, (c) = Cosigned By    Initials Name Provider Type     Wanda Ryan PTA Physical Therapy Assistant          Therapy Charges for Today     Code Description Service Date Service Provider Modifiers Qty    26543511954 HC PT THER PROC EA 15 MIN 12/22/2017 Wanda Ryan PTA GP 1    14810871398 HC PT THER SUPP EA 15 MIN 12/22/2017 Wanda Ryan PTA GP 1          PT G-Codes  Outcome Measure Options: AM-PAC 6 Clicks Basic Mobility (PT)    Wanda Ryan PTA  12/22/2017

## 2017-12-22 NOTE — THERAPY TREATMENT NOTE
Acute Care - Physical Therapy Treatment Note  [facility]     Patient Name: Nicanor Haley  : 1950  MRN: [id number]  Today's Date: 2017  Onset of Illness/Injury or Date of Surgery Date: 17  Date of Referral to PT: 17  Referring Physician: Lai Muro    Admit Date: 2017    Visit Dx:    ICD-10-CM ICD-9-CM   1. Fall, initial encounter W19.XXXA E888.9   2. Fracture tibia/fibula, left, closed, initial encounter S82.202A 823.82    S82.402A    3. Contusion of scalp, initial encounter S00.03XA 920   4. Rule Out Dislocation of left patella, initial encounter S83.005A 836.3   5. Muscle weakness (generalized) M62.81 728.87     Patient Active Problem List   Diagnosis   • History of colon polyps   • Acute pancreatitis due to calculus of common bile duct   • Abdominal pain, generalized   • Closed left tibial fracture   • Closed bimalleolar fracture of left ankle   • Left tibial fracture               Adult Rehabilitation Note       17 1735 17 1507 17 1647    Rehab Assessment/Intervention    Discipline physical therapy assistant  -JM physical therapist  -EM physical therapist  -AR    Document Type therapy note (daily note)  -JM therapy note (daily note)  -EM therapy note (daily note)  -AR    Subjective Information agree to therapy;complains of;weakness;fatigue  - agree to therapy;no complaints  -EM agree to therapy  -AR    Patient Effort, Rehab Treatment  adequate  -EM adequate  -AR    Precautions/Limitations non-weight bearing status  -JM non-weight bearing status  -EM fall precautions   NWB LLE  -AR    Specific Treatment Considerations NWB left   -JM      Recorded by [JM] Ary Newell PTA [EM] Dori Medrano, PT [AR] Agnes Mcelroy, PT    Pain Assessment    Pain Assessment No/denies pain  -JM No/denies pain  -EM No/denies pain  -AR    Recorded by [JM] Ary Newell PTA [EM] Dori Medrano, PT [AR] Agnes Mcelroy, PT    Cognitive Assessment/Intervention  "   Current Cognitive/Communication Assessment   impaired  -AR    Orientation Status   oriented to;person;place  -AR    Follows Commands/Answers Questions   100% of the time;able to follow single-step instructions;needs cueing;needs increased time;needs repetition  -AR    Recorded by   [AR] Agnes Mcelroy, PT    Bed Mobility, Assessment/Treatment    Bed Mobility, Assistive Device bed rails;head of bed elevated  -  bed rails;head of bed elevated;draw sheet  -AR    Bed Mob, Supine to Sit, [place] minimum assist (75% patient effort)  - contact guard assist  -EM maximum assist (25% patient effort)  -AR    Bed Mob, Sit to Supine, [place]  contact guard assist  -EM maximum assist (25% patient effort)  -AR    Bed Mobility, Safety Issues decreased use of arms for pushing/pulling;decreased use of legs for bridging/pushing;impaired trunk control for bed mobility  -      Bed Mobility, Impairments strength decreased;impaired balance  -      Bed Mobility, Comment  seated side scoot up to head of bed x3, using BR, cues for NWB on LLE  -EM sitting balance w/ min to mod A  -AR    Recorded by [JM] Ary Newell, PTA [EM] Dori Medrano, PT [AR] Agnes Mcelroy PT    Transfer Assessment/Treatment    Transfers, Bed-Chair [place] 2 person assist required;maximum assist (25% patient effort)  -  not appropriate to assess   currently recommend t/f via julissa - RN notified   -AR    Transfers, Sit-Stand [place] maximum assist (25% patient effort);2 person assist required  -  not appropriate to assess  -AR    Transfers, Stand-Sit [place] 2 person assist required;maximum assist (25% patient effort)  -      Transfer, Comment stood once in bariatric wx, but req seated rest; unable to fully  wx so perf HHA pivot tfer to chair, exit to rt    chair at foot of bed  - pt states he usually \"rocks\" 3 times to propel himself to standing, unable to maintain NWB even while \"rocking\" so sit to stand " tsf deferred   -EM uanble to maintain NWB in sitting, poor sitting balance not safe to stand today  -AR    Recorded by [JM] Ary Newell, DEBORAH [EM] Dori Medrano, PT [AR] Agnes Mcelroy, PT    Gait Assessment/Treatment    Gait, [place] Level   not appropriate to assess  -AR    Recorded by   [AR] Agnes Mcelroy PT    Balance Skills Training    Sitting-Level of Assistance  Close supervision  -EM     Sitting-Balance Support  Feet supported  -EM     Recorded by  [EM] Dori Medrano, PT     Therapy Exercises    Bilateral Lower Extremities  AROM:;AAROM:;10 reps;ankle pumps/circles;heel slides;hip abduction/adduction;LAQ;SLR  -EM 10 reps   LAQ w/ assist   -AR    Recorded by  [EM] Dori Medrano, PT [AR] Agnes Mcelroy PT    Positioning and Restraints    Pre-Treatment Position in bed  -JM in bed  -EM in bed  -AR    Post Treatment Position chair  -JM bed  -EM bed  -AR    In Bed  supine;call light within reach;notified nsg  -EM notified nsg;supine;call light within reach;encouraged to call for assist;exit alarm on  -AR    In Chair reclined;call light within reach;encouraged to call for assist;with family/caregiver;LLE elevated  -JM      Recorded by [JM] Ary Newell, DEBORAH [EM] Dori Medrano, PT [AR] Agnes Mcelroy, PT      User Key  (r) = Recorded By, (t) = Taken By, (c) = Cosigned By    Initials Name Effective Dates    EM Dori Medrano, PT 12/01/15 -     ASHLI Newell, PTA 02/18/16 -     AR Agnes Mcelroy, PT 06/27/16 -                 IP PT Goals       12/18/17 1056          Bed Mobility PT LTG    Bed Mobility PT LTG, Date Established 12/18/17  -MS      Bed Mobility PT LTG, Time to Achieve 5 - 7 days  -MS      Bed Mobility PT LTG, Activity Type all bed mobility  -MS      Bed Mobility PT LTG, [place] Level contact guard assist  -MS      Transfer Training PT LTG    Transfer Training PT LTG, Date Established 12/18/17  -MS      Transfer Training PT LTG, Time to Achieve 5 - 7  days  -MS      Transfer Training PT LTG, Activity Type sit to stand/stand to sit  -MS      Transfer Training PT LTG, [place] Level minimum assist (75% patient effort);2 person assist required  -MS      Transfer Training PT LTG, Assist Device walker, rolling  -MS      Transfer Training PT LTG, Additional Goal Maintaining NWB Left L.E.  -MS      Transfer Training 2 PT LTG    Transfer Training PT 2 LTG, Date Established 12/18/17  -MS      Transfer Training PT 2 LTG, Time to Achieve 5 - 7 days  -MS      Transfer Training PT 2 LTG, Activity Type bed to chair /chair to bed  -MS      Transfer Training PT 2 LTG, [place] minimum assist (75% patient effort);2 person assist required  -MS      Transfer Training PT 2 LTG, Assist Device walker, rolling  -MS      Transfer Training PT 2 LTG, Additional Goal Maintaining NWB Left L.E.  -MS        User Key  (r) = Recorded By, (t) = Taken By, (c) = Cosigned By    Initials Name Provider Type    MS Elvis Tejeda, PT Physical Therapist          Physical Therapy Education     Title: PT OT SLP Therapies (Active)     Topic: Physical Therapy (Active)     Point: Mobility training (Active)    Learning Progress Summary    Learner Readiness Method Response Comment Documented by Status   Patient Acceptance E,TB,D NR   12/21/17 1754 Active    Acceptance E NR  EM 12/20/17 1511 Active    Acceptance E NR  AR 12/19/17 1649 Active    Acceptance E,D VU,NR  MS 12/18/17 1056 Done   Family Acceptance E,TB,D NR   12/21/17 1754 Active               Point: Home exercise program (Active)    Learning Progress Summary    Learner Readiness Method Response Comment Documented by Status   Patient Acceptance E,TB,D NR   12/21/17 1754 Active    Acceptance E NR  EM 12/20/17 1511 Active    Acceptance E NR  AR 12/19/17 1649 Active    Acceptance E,D VU,NR  MS 12/18/17 1056 Done   Family Acceptance E,TB,D NR   12/21/17 1754 Active               Point: Body mechanics (Active)    Learning Progress  Summary    Learner Readiness Method Response Comment Documented by Status   Patient Acceptance E,TB,D NR   12/21/17 1754 Active    Acceptance E NR  AR 12/19/17 1649 Active    Acceptance E,D VU,NR  MS 12/18/17 1056 Done   Family Acceptance E,TB,D NR   12/21/17 1754 Active               Point: Precautions (Active)    Learning Progress Summary    Learner Readiness Method Response Comment Documented by Status   Patient Acceptance E,TB,D NR   12/21/17 1754 Active    Acceptance E NR  AR 12/19/17 1649 Active    Acceptance E,D VU,NR  MS 12/18/17 1056 Done   Family Acceptance E,TB,D NR   12/21/17 1754 Active                      User Key     Initials Effective Dates Name Provider Type Discipline    EM 12/01/15 -  Dori Medrano, PT Physical Therapist PT     02/18/16 -  Ary Newell, PTA Physical Therapy Assistant PT    MS 12/01/15 -  Elvis Tejeda, PT Physical Therapist PT    AR 06/27/16 -  Agnes Mcelroy, PT Physical Therapist PT                    PT Recommendation and Plan  Anticipated Discharge Disposition: skilled nursing facility  Planned Therapy Interventions: balance training, bed mobility training, gait training, home exercise program, patient/family education, postural re-education, strengthening, transfer training  PT Frequency: daily  Plan of Care Review  Plan Of Care Reviewed With: patient, spouse  Progress: improving  Outcome Summary/Follow up Plan: giovana tfer to chair, but alerted RN, he made need to be a [facility] lift back to bed once fatigued           Outcome Measures       12/21/17 1700 12/20/17 1500 12/19/17 1600    How much help from another person do you currently need...    Turning from your back to your side while in flat bed without using bedrails? 3  -JM 3  -EM 2  -AR    Moving from lying on back to sitting on the side of a flat bed without bedrails? 2  -JM 3  -EM 2  -AR    Moving to and from a bed to a chair (including a wheelchair)? 2  -JM 1  -EM 1  -AR    Standing up from a chair  using your arms (e.g., wheelchair, bedside chair)? 2  - 1  -EM 1  -AR    Climbing 3-5 steps with a railing? 1  - 1  -EM 1  -AR    To walk in hospital room? 1  -JM 1  -EM 1  -AR    AM-PAC 6 Clicks Score 11  -JM 10  -EM 8  -AR      User Key  (r) = Recorded By, (t) = Taken By, (c) = Cosigned By    Initials Name Provider Type    LAZARO Medrano, PT Physical Therapist    ASHLI Newell PTA Physical Therapy Assistant    FRANCISCO Mcelroy, PT Physical Therapist           Time Calculation:         PT Charges       12/21/17 1952          Time Calculation    Start Time 1715  -      Stop Time 1732  -      Time Calculation (min) 17 min  -      PT Received On 12/21/17  -ASHLI      PT - Next Appointment 12/22/17  -        User Key  (r) = Recorded By, (t) = Taken By, (c) = Cosigned By    Initials Name Provider Type    ASHLI Newell PTA Physical Therapy Assistant          Therapy Charges for Today     Code Description Service Date Service Provider Modifiers Qty    07449133877 HC PT THER PROC EA 15 MIN 12/21/2017 Ary Newell PTA GP 1    80962687232 HC PT THER SUPP EA 15 MIN 12/21/2017 Ary Newell PTA GP 1          PT G-Codes  Outcome Measure Options: AM-PAC 6 Clicks Basic Mobility (PT)    Ary Newell PTA  12/21/2017

## 2017-12-23 VITALS
SYSTOLIC BLOOD PRESSURE: 120 MMHG | BODY MASS INDEX: 46.65 KG/M2 | HEIGHT: 69 IN | DIASTOLIC BLOOD PRESSURE: 65 MMHG | HEART RATE: 72 BPM | WEIGHT: 315 LBS | RESPIRATION RATE: 18 BRPM | TEMPERATURE: 98 F | OXYGEN SATURATION: 95 %

## 2017-12-23 PROCEDURE — 97110 THERAPEUTIC EXERCISES: CPT | Performed by: PHYSICAL THERAPIST

## 2017-12-23 PROCEDURE — 25010000002 ENOXAPARIN PER 10 MG: Performed by: ORTHOPAEDIC SURGERY

## 2017-12-23 RX ORDER — HYDROMORPHONE HYDROCHLORIDE 4 MG/1
4 TABLET ORAL EVERY 6 HOURS PRN
Qty: 20 TABLET | Refills: 0 | Status: SHIPPED | OUTPATIENT
Start: 2017-12-23 | End: 2019-04-15

## 2017-12-23 RX ADMIN — POLYETHYLENE GLYCOL (3350) 17 G: 17 POWDER, FOR SOLUTION ORAL at 08:54

## 2017-12-23 RX ADMIN — HYDROMORPHONE HYDROCHLORIDE 4 MG: 2 TABLET ORAL at 12:53

## 2017-12-23 RX ADMIN — HYDROMORPHONE HYDROCHLORIDE 4 MG: 2 TABLET ORAL at 06:55

## 2017-12-23 RX ADMIN — HYDROMORPHONE HYDROCHLORIDE 4 MG: 2 TABLET ORAL at 01:04

## 2017-12-23 RX ADMIN — ENOXAPARIN SODIUM 40 MG: 40 INJECTION SUBCUTANEOUS at 08:54

## 2017-12-23 RX ADMIN — HYDROMORPHONE HYDROCHLORIDE 4 MG: 2 TABLET ORAL at 04:22

## 2017-12-23 RX ADMIN — DOCUSATE SODIUM 100 MG: 100 CAPSULE, LIQUID FILLED ORAL at 08:54

## 2017-12-23 RX ADMIN — LEVOTHYROXINE SODIUM 125 MCG: 0.12 TABLET ORAL at 06:55

## 2017-12-23 RX ADMIN — HYDROMORPHONE HYDROCHLORIDE 4 MG: 2 TABLET ORAL at 10:11

## 2017-12-23 NOTE — PLAN OF CARE
Problem: Patient Care Overview (Adult)  Goal: Plan of Care Review  Outcome: Ongoing (interventions implemented as appropriate)   12/22/17 2053   Coping/Psychosocial Response Interventions   Plan Of Care Reviewed With patient   Patient Care Overview   Progress progress toward functional goals as expected   Outcome Evaluation   Outcome Summary/Follow up Plan VSS, NV stable. Patient was to be discharged today, Admitting MD came at 1830, saw patient , put in discharge Summary, but left no Prescription. Notified Dr. Cox, indicated to page over head to see if he is still in hospital, if not, hold dc until tomorrow and notify Dr. Betancourt about need for prescription. Pt. x 3 to pivot only today.      Goal: Adult Individualization and Mutuality  Outcome: Ongoing (interventions implemented as appropriate)    Goal: Discharge Needs Assessment  Outcome: Outcome(s) achieved Date Met: 12/22/17      Problem: Fall Risk (Adult)  Goal: Identify Related Risk Factors and Signs and Symptoms  Outcome: Ongoing (interventions implemented as appropriate)      Problem: Pressure Ulcer Risk (Al[place] Scale) (Adult,Obstetrics,Pediatric)  Goal: Identify Related Risk Factors and Signs and Symptoms  Outcome: Outcome(s) achieved Date Met: 12/22/17    Goal: Skin Integrity  Outcome: Ongoing (interventions implemented as appropriate)      Problem: Orthopaedic Fracture (Adult)  Goal: Signs and Symptoms of Listed Potential Problems Will be Absent or Manageable (Orthopaedic Fracture)  Outcome: Outcome(s) achieved Date Met: 12/22/17

## 2017-12-23 NOTE — PROGRESS NOTES
Orthopedic Progress Note        Patient: Nicanor TAN Arlt    Date of Admission: 12/17/2017  9:55 AM    YOB: 1950    Medical Record Number: [id number]    Attending Physician: Ariel Betancourt MD    Systemic or Specific Complaints: Reports pain adequately controlled.  Tolerating po well.  No complaints or problems.  Left leg pain status post ORIF of left tib-fib seems to be doing well anxious to be discharged.  Should be noted patient has been on chronic Dilaudid by mouth for years his prescriptions are given to him by Dr. Betancourt    Allergies:   Allergies   Allergen Reactions   • Keflex [Cephalexin] Other (See Comments)     Pt reports that it makes him violent (12/17/17)        Medications:   Current Medications:  Scheduled Meds:  atorvastatin 10 mg Oral Daily   docusate sodium 100 mg Oral Daily   enoxaparin 40 mg Subcutaneous Daily   hydrochlorothiazide 25 mg Oral Daily   isosorbide mononitrate 30 mg Oral Daily   levothyroxine 125 mcg Oral Q AM   metoprolol tartrate 25 mg Oral Q12H   polyethylene glycol 17 g Oral Daily   probenecid 500 mg Oral BID   sertraline 100 mg Oral Daily   traZODone 150 mg Oral Nightly     Continuous Infusions:   PRN Meds:.•  acetaminophen  •  HYDROmorphone  •  HYDROmorphone **FOLLOWED BY** [START ON 12/27/2017] HYDROmorphone  •  ondansetron  •  Insert peripheral IV **AND** sodium chloride      Physical Exam: 67 y.o. male    General Appearance:   Awake and alert.  NAD.   Vitals:    12/22/17 1900 12/22/17 2300 12/23/17 0300 12/23/17 0734   BP: 135/74 137/74 139/81 120/65   BP Location: Right arm Right arm Right arm Right arm   Patient Position: Lying Lying Lying Lying   Pulse: 80 75 71 72   Resp: 16 16 16 18   Temp: 99.9 °F (37.7 °C) 98 °F (36.7 °C) 98.5 °F (36.9 °C) 98 °F (36.7 °C)   TempSrc: Oral Oral Oral Oral   SpO2: 99% 94% 92% 95%   Weight:       Height:              Extremities:   Operative extremity neurovascular status intact. ROM intact.    Incision intact w/out signs or  symptoms of infection. No           edema, no cyanosis, no calf tenderness     Pulses:     Pulses palpable and equal bilaterally     Skin:      Skin warm/dry w/out ulceration, ecchymosis, rash, or   cyanosis     Diagnostic Tests:   Lab Results (last 24 hours)     ** No results found for the last 24 hours. **            Assessment:Status post ORIF of left tib-fib seems to be doing well  Plan:    Continue efforts to mobilize  Continue pain control measures  Continue incisional Care  Patient is on chronic Dilaudid by mouth   DC'd today  Date: 12/23/2017  Time: 8:30 AM    Miladis Reyes MD

## 2017-12-23 NOTE — PLAN OF CARE
Problem: Patient Care Overview (Adult)  Goal: Plan of Care Review  Outcome: Ongoing (interventions implemented as appropriate)   12/23/17 0349   Coping/Psychosocial Response Interventions   Plan Of Care Reviewed With patient   Patient Care Overview   Progress improving   Outcome Evaluation   Outcome Summary/Follow up Plan HTN well controlled pain well controlled.        Problem: Fall Risk (Adult)  Goal: Identify Related Risk Factors and Signs and Symptoms  Outcome: Ongoing (interventions implemented as appropriate)   12/23/17 0349   Fall Risk   Fall Risk: Related Risk Factors history of falls;fear of falling;objects hard to reach;slipper/uneven surfaces   Fall Risk: Signs and Symptoms presence of risk factors       Problem: Orthopaedic Fracture (Adult)  Goal: Signs and Symptoms of Listed Potential Problems Will be Absent or Manageable (Orthopaedic Fracture)  Outcome: Ongoing (interventions implemented as appropriate)   12/23/17 0349   Orthopaedic Fracture   Problems Assessed (Orthopaedic Fracture) all   Problems Present (Orthopaedic Fracture) functional deficit/ self-care deficit

## 2017-12-23 NOTE — THERAPY TREATMENT NOTE
Acute Care - Physical Therapy Treatment Note  [facility]     Patient Name: Nicanor Haley  : 1950  MRN: [id number]  Today's Date: 2017  Onset of Illness/Injury or Date of Surgery Date: 17  Date of Referral to PT: 17  Referring Physician: Lai Muro    Admit Date: 2017    Visit Dx:    ICD-10-CM ICD-9-CM   1. Fall, initial encounter W19.XXXA E888.9   2. Fracture tibia/fibula, left, closed, initial encounter S82.202A 823.82    S82.402A    3. Contusion of scalp, initial encounter S00.03XA 920   4. Rule Out Dislocation of left patella, initial encounter S83.005A 836.3   5. Muscle weakness (generalized) M62.81 728.87     Patient Active Problem List   Diagnosis   • History of colon polyps   • Acute pancreatitis due to calculus of common bile duct   • Abdominal pain, generalized   • Closed left tibial fracture   • Closed bimalleolar fracture of left ankle   • Left tibial fracture               Adult Rehabilitation Note       17 0800 17 1400 17 1735    Rehab Assessment/Intervention    Discipline physical therapist  -MP physical therapy assistant  -RW physical therapy assistant  -JM    Document Type therapy note (daily note)  -MP therapy note (daily note)  -RW therapy note (daily note)  -    Subjective Information agree to therapy;complains of;pain  -MP agree to therapy;complains of;pain  -RW agree to therapy;complains of;weakness;fatigue  -    Precautions/Limitations fall precautions;non-weight bearing status  -MP fall precautions;non-weight bearing status   NWB on LLE  -RW non-weight bearing status  -    Specific Treatment Considerations NWB L LE  -MP  NWB left   -JM    Recorded by [MP] Manuel Sosa, PT [RW] Wanda Ryan PTA [JM] Ary Newell PTA    Pain Assessment    Pain Assessment 0-10  -MP 0-10  -RW No/denies pain  -JM    Pain Score 6  -MP 9  -RW     Post Pain Score 6  -MP      Pain Type Acute pain  -MP Acute pain  -RW     Pain Location Leg  -MP Leg   -RW     Pain Orientation  Left  -RW     Pain Intervention(s)  Repositioned;Rest  -RW     Response to Interventions  tolerated, unchanged  -RW     Recorded by [MP] Manuel Sosa, PT [RW] Wanda Ryan, DEBORAH [JM] Ary Newell PTA    Cognitive Assessment/Intervention    Current Cognitive/Communication Assessment functional  -MP functional  -RW     Orientation Status oriented x 4  -MP oriented x 4  -RW     Follows Commands/Answers Questions 100% of the time  -% of the time;needs cueing  -RW     Personal Safety mild impairment  -MP mild impairment;at risk behaviors demonstrated  -RW     Personal Safety Interventions fall prevention program maintained;gait belt  -MP fall prevention program maintained;gait belt;nonskid shoes/slippers when out of bed  -RW     Recorded by [MP] Manuel Sosa, PT [RW] Wanda Ryna PTA     Bed Mobility, Assessment/Treatment    Bed Mobility, Assistive Device bed rails  -MP  bed rails;head of bed elevated  -    Bed Mobility, Roll Left, [place] set up required  -MP      Bed Mob, Supine to Sit, [place] set up required  -MP not tested   Pt up in chair  -RW minimum assist (75% patient effort)  -    Bed Mob, Sit to Supine, [place] supervision required  -MP moderate assist (50% patient effort);2 person assist required;verbal cues required;nonverbal cues required (demo/gesture)  -RW     Bed Mobility, Safety Issues  decreased use of arms for pushing/pulling;decreased use of legs for bridging/pushing  -RW decreased use of arms for pushing/pulling;decreased use of legs for bridging/pushing;impaired trunk control for bed mobility  -    Bed Mobility, Impairments   strength decreased;impaired balance  -    Recorded by [MP] Manuel Sosa, PT [RW] Wanda Ryan, PTA [JM] Ary eNwell PTA    Transfer Assessment/Treatment    Transfers, Bed-Chair [place] moderate assist (50% patient effort);2 person assist required  -  2 person assist required;maximum assist (25%  patient effort)  -JM    Transfers, Chair-Bed [place]  maximum assist (25% patient effort);verbal cues required;nonverbal cues required (demo/gesture)   x3 2 on both side of pt and 1 behind to pivot hips  -RW     Transfers, Sit-Stand [place]  maximum assist (25% patient effort);2 person assist required;verbal cues required;nonverbal cues required (demo/gesture)  -RW maximum assist (25% patient effort);2 person assist required  -JM    Transfers, Stand-Sit [place]  maximum assist (25% patient effort);2 person assist required;verbal cues required;nonverbal cues required (demo/gesture)  -RW 2 person assist required;maximum assist (25% patient effort)  -JM    Transfers, Sit-Stand-Sit, Assist Device  --   unable to stand w/ use of RW  -RW     Transfer, Safety Issues  weight-shifting ability decreased;loses balance backward  -RW     Transfer, Impairments  strength decreased;impaired balance  -RW     Transfer, Comment He used rolling walker to perform stand pivot sit from bed to chair.  -MP Attempted to stand x2. Unable to stand w/ walker. Did better keeping weight off ofLLE  -RW stood once in bariatric wx, but req seated rest; unable to fully  wx so perf HHA pivot tfer to chair, exit to rt    chair at foot of bed  -JM    Recorded by [MP] Manuel Sosa, PT [RW] Wanda Ryan PTA [JM] Ary Newell PTA    Gait Assessment/Treatment    Gait, [place] Level  not appropriate to assess  -RW     Recorded by  [RW] Wanda Ryan PTA     Positioning and Restraints    Pre-Treatment Position in bed  -MP sitting in chair/recliner  -RW in bed  -JM    Post Treatment Position chair  -MP bed  -RW chair  -JM    In Bed  fowlers;call light within reach;encouraged to call for assist;exit alarm on  -RW     In Chair notified nsg;call light within reach;encouraged to call for assist  -MP  reclined;call light within reach;encouraged to call for assist;with family/caregiver;LLE elevated  -JM    Recorded by [MP] Manuel  "Sheila, PT [RW] Wanda Ryan, PTA [JM] Ary Newell, PTA      12/20/17 3217          Rehab Assessment/Intervention    Discipline physical therapist  -EM      Document Type therapy note (daily note)  -EM      Subjective Information agree to therapy;no complaints  -EM      Patient Effort, Rehab Treatment adequate  -EM      Precautions/Limitations non-weight bearing status  -EM      Recorded by [EM] Dori Medrano, PT      Pain Assessment    Pain Assessment No/denies pain  -EM      Recorded by [EM] Dori Medrano, PT      Bed Mobility, Assessment/Treatment    Bed Mob, Supine to Sit, [place] contact guard assist  -EM      Bed Mob, Sit to Supine, [place] contact guard assist  -EM      Bed Mobility, Comment seated side scoot up to head of bed x3, using BR, cues for NWB on LLE  -EM      Recorded by [EM] Dori Medrano, PT      Transfer Assessment/Treatment    Transfer, Comment pt states he usually \"rocks\" 3 times to propel himself to standing, unable to maintain NWB even while \"rocking\" so sit to stand tsf deferred   -EM      Recorded by [EM] Dori Medrano, PT      Balance Skills Training    Sitting-Level of Assistance Close supervision  -EM      Sitting-Balance Support Feet supported  -EM      Recorded by [EM] Dori Medrano, PT      Therapy Exercises    Bilateral Lower Extremities AROM:;AAROM:;10 reps;ankle pumps/circles;heel slides;hip abduction/adduction;LAQ;SLR  -EM      Recorded by [EM] Dori Medrano, PT      Positioning and Restraints    Pre-Treatment Position in bed  -EM      Post Treatment Position bed  -EM      In Bed supine;call light within reach;notified nsg  -EM      Recorded by [EM] Dori Medrano, PT        User Key  (r) = Recorded By, (t) = Taken By, (c) = Cosigned By    Initials Name Effective Dates    EM Dori Medrano, PT 12/01/15 -     ASHLI Newell, PTA 02/18/16 -     RW Wanda Ryan, PTA 04/06/16 -     MP Manuel Sosa, PT 06/22/16 -  "                IP PT Goals       12/18/17 1056          Bed Mobility PT LTG    Bed Mobility PT LTG, Date Established 12/18/17  -MS      Bed Mobility PT LTG, Time to Achieve 5 - 7 days  -MS      Bed Mobility PT LTG, Activity Type all bed mobility  -MS      Bed Mobility PT LTG, [place] Level contact guard assist  -MS      Transfer Training PT LTG    Transfer Training PT LTG, Date Established 12/18/17  -MS      Transfer Training PT LTG, Time to Achieve 5 - 7 days  -MS      Transfer Training PT LTG, Activity Type sit to stand/stand to sit  -MS      Transfer Training PT LTG, [place] Level minimum assist (75% patient effort);2 person assist required  -MS      Transfer Training PT LTG, Assist Device walker, rolling  -MS      Transfer Training PT LTG, Additional Goal Maintaining NWB Left L.E.  -MS      Transfer Training 2 PT LTG    Transfer Training PT 2 LTG, Date Established 12/18/17  -MS      Transfer Training PT 2 LTG, Time to Achieve 5 - 7 days  -MS      Transfer Training PT 2 LTG, Activity Type bed to chair /chair to bed  -MS      Transfer Training PT 2 LTG, [place] Level minimum assist (75% patient effort);2 person assist required  -MS      Transfer Training PT 2 LTG, Assist Device walker, rolling  -MS      Transfer Training PT 2 LTG, Additional Goal Maintaining NWB Left L.E.  -MS        User Key  (r) = Recorded By, (t) = Taken By, (c) = Cosigned By    Initials Name Provider Type    MS Elvis FRANK Ileana, PT Physical Therapist          Physical Therapy Education     Title: PT OT SLP Therapies (Active)     Topic: Physical Therapy (Active)     Point: Mobility training (Active)    Learning Progress Summary    Learner Readiness Method Response Comment Documented by Status   Patient Eager DOMINICK SARABIA  MP 12/23/17 0901 Done    Acceptance E,TB,D VU,NR  RW 12/22/17 1504 Done    Acceptance E,TBD NR  JM 12/21/17 1754 Active    Acceptance E NR  EM 12/20/17 1511 Active    Acceptance E NR  AR 12/19/17 1649 Active     Acceptance E,D VU,NR  MS 12/18/17 1056 Done   Family Acceptance E,TB,D NR   12/21/17 1754 Active               Point: Home exercise program (Active)    Learning Progress Summary    Learner Readiness Method Response Comment Documented by Status   Patient Eager E VU   12/23/17 0901 Done    Acceptance E,TB,D NR   12/21/17 1754 Active    Acceptance E NR   12/20/17 1511 Active    Acceptance E NR  AR 12/19/17 1649 Active    Acceptance E,D VU,NR  MS 12/18/17 1056 Done   Family Acceptance E,TB,D NR   12/21/17 1754 Active               Point: Body mechanics (Active)    Learning Progress Summary    Learner Readiness Method Response Comment Documented by Status   Patient Eager E VU   12/23/17 0901 Done    Acceptance E,TB,D VU,NR   12/22/17 1504 Done    Acceptance E,TB,D NR   12/21/17 1754 Active    Acceptance E NR  AR 12/19/17 1649 Active    Acceptance E,D VU,NR  MS 12/18/17 1056 Done   Family Acceptance E,TB,D NR   12/21/17 1754 Active               Point: Precautions (Active)    Learning Progress Summary    Learner Readiness Method Response Comment Documented by Status   Patient Eager E VU   12/23/17 0901 Done    Acceptance E,TB,D VU,NR   12/22/17 1504 Done    Acceptance E,TB,D NR   12/21/17 1754 Active    Acceptance E NR  AR 12/19/17 1649 Active    Acceptance E,D VU,NR  MS 12/18/17 1056 Done   Family Acceptance E,TB,D NR   12/21/17 1754 Active                      User Key     Initials Effective Dates Name Provider Type Discipline    EM 12/01/15 -  Dori Medrano, PT Physical Therapist PT     02/18/16 -  Ary Newell, PTA Physical Therapy Assistant PT    MS 12/01/15 -  Elvis Tejeda, PT Physical Therapist PT    AR 06/27/16 -  Agnes Mcelroy, PT Physical Therapist PT     04/06/16 -  Wanda Ryan, PTA Physical Therapy Assistant PT     06/22/16 -  Manuel Sosa, PT Physical Therapist PT                    PT Recommendation and Plan  Anticipated Discharge Disposition: skilled  nursing facility  Planned Therapy Interventions: balance training, bed mobility training, gait training, home exercise program, patient/family education, postural re-education, strengthening, transfer training  PT Frequency: daily             Outcome Measures       12/23/17 0900 12/22/17 1500 12/21/17 1700    How much help from another person do you currently need...    Turning from your back to your side while in flat bed without using bedrails? 4  -MP 3  -RW 3  -JM    Moving from lying on back to sitting on the side of a flat bed without bedrails? 3  -MP 2  -RW 2  -JM    Moving to and from a bed to a chair (including a wheelchair)? 2  -MP 2  -RW 2  -JM    Standing up from a chair using your arms (e.g., wheelchair, bedside chair)? 2  -MP 2  -RW 2  -JM    Climbing 3-5 steps with a railing? 1  -MP 1  -RW 1  -JM    To walk in hospital room? 1  -MP 1  -RW 1  -JM    AM-PAC 6 Clicks Score 13  -MP 11  -RW 11  -JM    Functional Assessment    Outcome Measure Options AM-PAC 6 Clicks Basic Mobility (PT)  -MP AM-PAC 6 Clicks Basic Mobility (PT)  -RW       12/20/17 1500          How much help from another person do you currently need...    Turning from your back to your side while in flat bed without using bedrails? 3  -EM      Moving from lying on back to sitting on the side of a flat bed without bedrails? 3  -EM      Moving to and from a bed to a chair (including a wheelchair)? 1  -EM      Standing up from a chair using your arms (e.g., wheelchair, bedside chair)? 1  -EM      Climbing 3-5 steps with a railing? 1  -EM      To walk in hospital room? 1  -EM      AM-PAC 6 Clicks Score 10  -EM        User Key  (r) = Recorded By, (t) = Taken By, (c) = Cosigned By    Initials Name Provider Type    LAZARO Medrano, PT Physical Therapist    ASHLI Newell, PTA Physical Therapy Assistant    ZAHEER Ryan, PTA Physical Therapy Assistant    CLARIBEL Sosa, PT Physical Therapist           Time Calculation:         PT  Charges       12/23/17 0904          Time Calculation    Start Time 0835  -MP      Stop Time 0905  -MP      Time Calculation (min) 30 min  -MP      PT Received On 12/23/17  -MP      PT - Next Appointment 12/25/17  -MP      PT Goal Re-Cert Due Date 12/25/17  -MP        User Key  (r) = Recorded By, (t) = Taken By, (c) = Cosigned By    Initials Name Provider Type    MP Manuel Sosa PT Physical Therapist          Therapy Charges for Today     Code Description Service Date Service Provider Modifiers Qty    [id number] HC PT THER PROC EA 15 MIN 12/23/2017 Manuel Sosa PT GP 2          PT G-Codes  Outcome Measure Options: AM-PAC 6 Clicks Basic Mobility (PT)    Manuel Sosa PT  12/23/2017

## 2018-04-04 ENCOUNTER — TRANSCRIBE ORDERS (OUTPATIENT)
Dept: ADMINISTRATIVE | Facility: HOSPITAL | Age: 68
End: 2018-04-04

## 2018-04-04 DIAGNOSIS — M79.605 LEFT LEG PAIN: Primary | ICD-10-CM

## 2018-05-24 ENCOUNTER — HOSPITAL ENCOUNTER (OUTPATIENT)
Dept: CT IMAGING | Facility: HOSPITAL | Age: 68
Discharge: HOME | End: 2018-05-24
Attending: ORTHOPAEDIC SURGERY | Admitting: ORTHOPAEDIC SURGERY

## 2018-05-24 DIAGNOSIS — M79.605 LEFT LEG PAIN: ICD-10-CM

## 2018-05-24 PROCEDURE — 73700 CT LOWER EXTREMITY W/O DYE: CPT

## 2018-10-15 ENCOUNTER — HOSPITAL ENCOUNTER (OUTPATIENT)
Facility: HOSPITAL | Age: 68
Setting detail: OBSERVATION
Discharge: NURSING, REHAB OR LONG-TERM CARE | End: 2018-10-20
Attending: EMERGENCY MEDICINE | Admitting: PHYSICIAN ASSISTANT

## 2018-10-15 DIAGNOSIS — M62.3 IMMOBILITY SYNDROME: ICD-10-CM

## 2018-10-15 DIAGNOSIS — R26.2 DIFFICULTY WALKING: ICD-10-CM

## 2018-10-15 DIAGNOSIS — S82.832A CLOSED FRACTURE OF PROXIMAL END OF LEFT FIBULA, UNSPECIFIED FRACTURE MORPHOLOGY, INITIAL ENCOUNTER: Primary | ICD-10-CM
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
## 2018-10-15 PROCEDURE — G0378 HOSPITAL OBSERVATION PER HR: HCPCS

## 2018-10-15 PROCEDURE — 99285 EMERGENCY DEPT VISIT HI MDM: CPT

## 2018-10-16 ENCOUNTER — APPOINTMENT (OUTPATIENT)
Dept: GENERAL RADIOLOGY | Facility: HOSPITAL | Age: 68
End: 2018-10-16

## 2018-10-16 PROBLEM — S82.832A CLOSED FRACTURE OF UPPER END OF LEFT FIBULA: Status: ACTIVE | Noted: 2018-10-16

## 2018-10-16 PROBLEM — M62.3 IMMOBILITY SYNDROME: Status: ACTIVE | Noted: 2018-10-16

## 2018-10-16 LAB
ALBUMIN SERPL-MCNC: 3.9 G/DL (ref 3.5–5.2)
ALBUMIN/GLOB SERPL: 1 G/DL
ALP SERPL-CCNC: 78 U/L (ref 39–117)
ALT SERPL W P-5'-P-CCNC: 19 U/L (ref 1–41)
ANION GAP SERPL CALCULATED.3IONS-SCNC: 12.3 MMOL/L
AST SERPL-CCNC: 16 U/L (ref 1–40)
BASOPHILS # BLD AUTO: 0.01 10*3/MM3 (ref 0–0.2)
BASOPHILS NFR BLD AUTO: 0.2 % (ref 0–1.5)
BILIRUB SERPL-MCNC: 0.7 MG/DL (ref 0.1–1.2)
BILIRUB UR QL STRIP: NEGATIVE
BUN BLD-MCNC: 18 MG/DL (ref 8–23)
BUN/CREAT SERPL: 17.5 (ref 7–25)
CALCIUM SPEC-SCNC: 9.7 MG/DL (ref 8.6–10.5)
CHLORIDE SERPL-SCNC: 95 MMOL/L (ref 98–107)
CK SERPL-CCNC: 172 U/L (ref 20–200)
CLARITY UR: CLEAR
CO2 SERPL-SCNC: 30.7 MMOL/L (ref 22–29)
COLOR UR: YELLOW
CREAT BLD-MCNC: 1.03 MG/DL (ref 0.76–1.27)
DEPRECATED RDW RBC AUTO: 45.1 FL (ref 37–54)
EOSINOPHIL # BLD AUTO: 0.06 10*3/MM3 (ref 0–0.7)
EOSINOPHIL NFR BLD AUTO: 1.1 % (ref 0.3–6.2)
ERYTHROCYTE [DISTWIDTH] IN BLOOD BY AUTOMATED COUNT: 13.4 % (ref 11.5–14.5)
GFR SERPL CREATININE-BSD FRML MDRD: 72 ML/MIN/1.73
GLOBULIN UR ELPH-MCNC: 4 GM/DL
GLUCOSE BLD-MCNC: 120 MG/DL (ref 65–99)
GLUCOSE UR STRIP-MCNC: NEGATIVE MG/DL
HCT VFR BLD AUTO: 43.6 % (ref 40.4–52.2)
HGB BLD-MCNC: 14.2 G/DL (ref 13.7–17.6)
HGB UR QL STRIP.AUTO: NEGATIVE
HOLD SPECIMEN: NORMAL
IMM GRANULOCYTES # BLD: 0.02 10*3/MM3 (ref 0–0.03)
IMM GRANULOCYTES NFR BLD: 0.4 % (ref 0–0.5)
KETONES UR QL STRIP: ABNORMAL
LEUKOCYTE ESTERASE UR QL STRIP.AUTO: NEGATIVE
LYMPHOCYTES # BLD AUTO: 1.06 10*3/MM3 (ref 0.9–4.8)
LYMPHOCYTES NFR BLD AUTO: 19 % (ref 19.6–45.3)
MAGNESIUM SERPL-MCNC: 2 MG/DL (ref 1.6–2.4)
MCH RBC QN AUTO: 30.5 PG (ref 27–32.7)
MCHC RBC AUTO-ENTMCNC: 32.6 G/DL (ref 32.6–36.4)
MCV RBC AUTO: 93.8 FL (ref 79.8–96.2)
MONOCYTES # BLD AUTO: 0.87 10*3/MM3 (ref 0.2–1.2)
MONOCYTES NFR BLD AUTO: 15.6 % (ref 5–12)
NEUTROPHILS # BLD AUTO: 3.55 10*3/MM3 (ref 1.9–8.1)
NEUTROPHILS NFR BLD AUTO: 63.7 % (ref 42.7–76)
NITRITE UR QL STRIP: NEGATIVE
PH UR STRIP.AUTO: <=5 [PH] (ref 5–8)
PLATELET # BLD AUTO: 127 10*3/MM3 (ref 140–500)
PMV BLD AUTO: 10.5 FL (ref 6–12)
POTASSIUM BLD-SCNC: 3.6 MMOL/L (ref 3.5–5.2)
PROT SERPL-MCNC: 7.9 G/DL (ref 6–8.5)
PROT UR QL STRIP: NEGATIVE
RBC # BLD AUTO: 4.65 10*6/MM3 (ref 4.6–6)
SODIUM BLD-SCNC: 138 MMOL/L (ref 136–145)
SP GR UR STRIP: 1.03 (ref 1–1.03)
UROBILINOGEN UR QL STRIP: ABNORMAL
WBC NRBC COR # BLD: 5.57 10*3/MM3 (ref 4.5–10.7)
WHOLE BLOOD HOLD SPECIMEN: NORMAL

## 2018-10-16 PROCEDURE — G8979 MOBILITY GOAL STATUS: HCPCS

## 2018-10-16 PROCEDURE — 80053 COMPREHEN METABOLIC PANEL: CPT | Performed by: PHYSICIAN ASSISTANT

## 2018-10-16 PROCEDURE — 25010000002 HYDROMORPHONE 1 MG/ML SOLUTION: Performed by: EMERGENCY MEDICINE

## 2018-10-16 PROCEDURE — 25010000002 ONDANSETRON PER 1 MG: Performed by: PHYSICIAN ASSISTANT

## 2018-10-16 PROCEDURE — G0378 HOSPITAL OBSERVATION PER HR: HCPCS

## 2018-10-16 PROCEDURE — 85025 COMPLETE CBC W/AUTO DIFF WBC: CPT | Performed by: PHYSICIAN ASSISTANT

## 2018-10-16 PROCEDURE — 97162 PT EVAL MOD COMPLEX 30 MIN: CPT | Performed by: PHYSICAL THERAPIST

## 2018-10-16 PROCEDURE — 96375 TX/PRO/DX INJ NEW DRUG ADDON: CPT

## 2018-10-16 PROCEDURE — 96361 HYDRATE IV INFUSION ADD-ON: CPT

## 2018-10-16 PROCEDURE — 82550 ASSAY OF CK (CPK): CPT | Performed by: PHYSICIAN ASSISTANT

## 2018-10-16 PROCEDURE — G8978 MOBILITY CURRENT STATUS: HCPCS

## 2018-10-16 PROCEDURE — 99204 OFFICE O/P NEW MOD 45 MIN: CPT | Performed by: ORTHOPAEDIC SURGERY

## 2018-10-16 PROCEDURE — 96374 THER/PROPH/DIAG INJ IV PUSH: CPT

## 2018-10-16 PROCEDURE — 83735 ASSAY OF MAGNESIUM: CPT | Performed by: PHYSICIAN ASSISTANT

## 2018-10-16 PROCEDURE — 73590 X-RAY EXAM OF LOWER LEG: CPT

## 2018-10-16 PROCEDURE — 27780 TREATMENT OF FIBULA FRACTURE: CPT | Performed by: ORTHOPAEDIC SURGERY

## 2018-10-16 PROCEDURE — 81003 URINALYSIS AUTO W/O SCOPE: CPT | Performed by: PHYSICIAN ASSISTANT

## 2018-10-16 RX ORDER — SODIUM CHLORIDE 0.9 % (FLUSH) 0.9 %
3-10 SYRINGE (ML) INJECTION AS NEEDED
Status: DISCONTINUED | OUTPATIENT
Start: 2018-10-16 | End: 2018-10-20 | Stop reason: HOSPADM

## 2018-10-16 RX ORDER — ONDANSETRON 2 MG/ML
4 INJECTION INTRAMUSCULAR; INTRAVENOUS ONCE
Status: COMPLETED | OUTPATIENT
Start: 2018-10-16 | End: 2018-10-16

## 2018-10-16 RX ORDER — PROBENECID 500 MG/1
500 TABLET, FILM COATED ORAL EVERY 12 HOURS SCHEDULED
Status: DISCONTINUED | OUTPATIENT
Start: 2018-10-16 | End: 2018-10-16

## 2018-10-16 RX ORDER — ACETAMINOPHEN 325 MG/1
650 TABLET ORAL EVERY 4 HOURS PRN
Status: DISCONTINUED | OUTPATIENT
Start: 2018-10-16 | End: 2018-10-20 | Stop reason: HOSPADM

## 2018-10-16 RX ORDER — SODIUM CHLORIDE 9 MG/ML
100 INJECTION, SOLUTION INTRAVENOUS CONTINUOUS
Status: DISCONTINUED | OUTPATIENT
Start: 2018-10-16 | End: 2018-10-20 | Stop reason: HOSPADM

## 2018-10-16 RX ORDER — SODIUM CHLORIDE 0.9 % (FLUSH) 0.9 %
10 SYRINGE (ML) INJECTION AS NEEDED
Status: DISCONTINUED | OUTPATIENT
Start: 2018-10-16 | End: 2018-10-20 | Stop reason: HOSPADM

## 2018-10-16 RX ORDER — SODIUM CHLORIDE 0.9 % (FLUSH) 0.9 %
3 SYRINGE (ML) INJECTION EVERY 12 HOURS SCHEDULED
Status: DISCONTINUED | OUTPATIENT
Start: 2018-10-16 | End: 2018-10-20 | Stop reason: HOSPADM

## 2018-10-16 RX ORDER — HYDROCHLOROTHIAZIDE 25 MG/1
25 TABLET ORAL DAILY
Status: DISCONTINUED | OUTPATIENT
Start: 2018-10-16 | End: 2018-10-20 | Stop reason: HOSPADM

## 2018-10-16 RX ORDER — HYDROCHLOROTHIAZIDE 25 MG/1
25 TABLET ORAL DAILY
Status: DISCONTINUED | OUTPATIENT
Start: 2018-10-16 | End: 2018-10-16

## 2018-10-16 RX ORDER — LEVOTHYROXINE SODIUM 0.12 MG/1
125 TABLET ORAL DAILY
Status: DISCONTINUED | OUTPATIENT
Start: 2018-10-16 | End: 2018-10-17

## 2018-10-16 RX ORDER — SERTRALINE HYDROCHLORIDE 100 MG/1
100 TABLET, FILM COATED ORAL DAILY
Status: DISCONTINUED | OUTPATIENT
Start: 2018-10-17 | End: 2018-10-20 | Stop reason: HOSPADM

## 2018-10-16 RX ORDER — PROBENECID 500 MG/1
500 TABLET, FILM COATED ORAL EVERY 12 HOURS SCHEDULED
Status: DISCONTINUED | OUTPATIENT
Start: 2018-10-16 | End: 2018-10-20 | Stop reason: HOSPADM

## 2018-10-16 RX ORDER — ATORVASTATIN CALCIUM 20 MG/1
20 TABLET, FILM COATED ORAL DAILY
Status: DISCONTINUED | OUTPATIENT
Start: 2018-10-16 | End: 2018-10-20 | Stop reason: HOSPADM

## 2018-10-16 RX ORDER — ONDANSETRON 4 MG/1
4 TABLET, ORALLY DISINTEGRATING ORAL EVERY 6 HOURS PRN
Status: DISCONTINUED | OUTPATIENT
Start: 2018-10-16 | End: 2018-10-20 | Stop reason: HOSPADM

## 2018-10-16 RX ORDER — ISOSORBIDE MONONITRATE 30 MG/1
30 TABLET, EXTENDED RELEASE ORAL DAILY
Status: DISCONTINUED | OUTPATIENT
Start: 2018-10-16 | End: 2018-10-20 | Stop reason: HOSPADM

## 2018-10-16 RX ORDER — HYDROMORPHONE HYDROCHLORIDE 4 MG/1
4 TABLET ORAL EVERY 6 HOURS PRN
Status: DISCONTINUED | OUTPATIENT
Start: 2018-10-16 | End: 2018-10-20 | Stop reason: HOSPADM

## 2018-10-16 RX ORDER — SERTRALINE HYDROCHLORIDE 100 MG/1
100 TABLET, FILM COATED ORAL DAILY
Status: DISCONTINUED | OUTPATIENT
Start: 2018-10-16 | End: 2018-10-16

## 2018-10-16 RX ADMIN — Medication 3 ML: at 15:15

## 2018-10-16 RX ADMIN — LEVOTHYROXINE SODIUM 125 MCG: 125 TABLET ORAL at 10:04

## 2018-10-16 RX ADMIN — SODIUM CHLORIDE 100 ML/HR: 9 INJECTION, SOLUTION INTRAVENOUS at 15:15

## 2018-10-16 RX ADMIN — HYDROMORPHONE HYDROCHLORIDE 0.5 MG: 1 INJECTION, SOLUTION INTRAMUSCULAR; INTRAVENOUS; SUBCUTANEOUS at 02:15

## 2018-10-16 RX ADMIN — ISOSORBIDE MONONITRATE 30 MG: 30 TABLET ORAL at 10:04

## 2018-10-16 RX ADMIN — PROBENECID 500 MG: 500 TABLET, FILM COATED ORAL at 20:11

## 2018-10-16 RX ADMIN — METOPROLOL TARTRATE 25 MG: 25 TABLET ORAL at 20:11

## 2018-10-16 RX ADMIN — ATORVASTATIN CALCIUM 20 MG: 20 TABLET, FILM COATED ORAL at 10:03

## 2018-10-16 RX ADMIN — HYDROMORPHONE HYDROCHLORIDE 4 MG: 4 TABLET ORAL at 13:36

## 2018-10-16 RX ADMIN — HYDROMORPHONE HYDROCHLORIDE 4 MG: 4 TABLET ORAL at 18:32

## 2018-10-16 RX ADMIN — ONDANSETRON 4 MG: 2 INJECTION INTRAMUSCULAR; INTRAVENOUS at 02:15

## 2018-10-16 RX ADMIN — HYDROCHLOROTHIAZIDE 25 MG: 25 TABLET ORAL at 13:36

## 2018-10-16 RX ADMIN — METOPROLOL TARTRATE 25 MG: 25 TABLET ORAL at 13:36

## 2018-10-16 RX ADMIN — Medication 3 ML: at 20:52

## 2018-10-16 NOTE — THERAPY EVALUATION
Acute Care - Physical Therapy Initial Evaluation  [facility]     Patient Name: Nicanor Haley  : 1950  MRN: [id number]  Today's Date: 10/16/2018                Admit Date: 10/15/2018    Visit Dx:     ICD-10-CM ICD-9-CM   1. Closed fracture of proximal end of left fibula, unspecified fracture morphology, initial encounter S82.832A 823.01   2. Immobility syndrome M62.3 728.3   3. Fall in home, initial encounter W19.XXXA E888.9    Y92.009 E849.0   4. Difficulty walking R26.2 719.7     Patient Active Problem List   Diagnosis   • History of colon polyps   • Acute pancreatitis due to calculus of common bile duct   • Abdominal pain, generalized   • Closed left tibial fracture   • Closed bimalleolar fracture of left ankle   • Left tibial fracture   • Closed fracture of upper end of left fibula     Past Medical History:   Diagnosis Date   • Arthritis    • Colon cancer (CMS/HCC) 2014    tubulovillous adenomas with adenocarcinoma in situ s/p right open colectomy   • Colon polyps 2014    1) cecal polyps: fragments of adenomatous polyp with low grade-dysplasia 2) ascending colon mass bx: fragments of tubulovillous adenoma w/ low grade dysplasia 3) transverse colon polyp: fragments of adenomatous polyp with low grade dysplasia 4) sigmoid polyp: fragments of adenomatous polyp with low grade dysplasia   • Gout    • Hyperlipemia    • Hypertension    • Hypothyroidism    • Sleep apnea      Past Surgical History:   Procedure Laterality Date   • CHOLECYSTECTOMY     • CHOLECYSTECTOMY WITH INTRAOPERATIVE CHOLANGIOGRAM N/A 11/10/2017    Procedure: CHOLECYSTECTOMY LAPAROSCOPIC INTRAOPERATIVE CHOLANGIOGRAM;  Surgeon: Elian Carnes MD;  Location: [facility] OR;  Service:    • COLECTOMY PARTIAL / TOTAL Right 2014    Open right colectomy-Dr. Elian Carnes   • COLONOSCOPY N/A 3/15/2017    Procedure: COLONOSCOPY TO ANASTAMOSIS AND TI WITH COLD SNARE POLYPECTOMY ;  Surgeon: Elian Carnes MD;  Location: [facility]  "ENDOSCOPY;  Service:    • COLONOSCOPY N/A 08/06/2014    1 cm cecal polyp, 1 cm transverse polyp, 1 cm sigmoid colon polyp, mass in the ascending colon occupying 1/3 of the colonic circumference, mass proximal to the transverse colon occupying 1/4 colonic circumference-Dr. Elian Carnes   • FOOT SURGERY Right 2008, 2009   • GASTRIC BANDING  12/28/2009   • JOINT REPLACEMENT     • NOSE SURGERY     • ORIF TIBIA/FIBULA FRACTURES Left 12/17/2017    Procedure: TIBIA/FIBULA OPEN REDUCTION INTERNAL FIXATION;  Surgeon: Lai Acevedo Jr., MD;  Location: [facility] OR;  Service:    • TOTAL KNEE ARTHROPLASTY Bilateral      1/2002-left knee 6/2002-right knee         PT ASSESSMENT (last 12 hours)      Physical Therapy Evaluation     Row Name 10/16/18 1640          PT Evaluation Time/Intention    Subjective Information complains of;pain  -     Document Type evaluation  -     Mode of Treatment physical therapy  -     Patient Effort adequate  -     Symptoms Noted During/After Treatment increased pain  -     Row Name 10/16/18 1640          General Information    Patient Observations alert;cooperative;agree to therapy  -     General Observations of Patient in bed, edema and redness noted on L calf and shin  -     Prior Level of Function independent:  -KH     Equipment Currently Used at Home walker, rolling  -     Pertinent History of Current Functional Problem L fibula fx  -     Existing Precautions/Restrictions fall  -     Row Name 10/16/18 1640          Relationship/Environment    Lives With other (see comments)   \" I live with a girl\"  -     Row Name 10/16/18 1640          Resource/Environmental Concerns    Current Living Arrangements home/apartment/condo  -     Row Name 10/16/18 1640          Cognitive Assessment/Interventions    Additional Documentation Cognitive Assessment/Intervention (Group)  -     Row Name 10/16/18 1640          Cognitive Assessment/Intervention- PT/OT    Orientation Status " (Cognition) oriented x 3  -     Follows Commands (Cognition) WNL  -     Personal Safety Interventions fall prevention program maintained;gait belt;nonskid shoes/slippers when out of bed  -     Row Name 10/16/18 1640          Mobility Assessment/Treatment    Extremity Weight-bearing Status left lower extremity  -     Left Lower Extremity (Weight-bearing Status) weight-bearing as tolerated (WBAT)  -     Row Name 10/16/18 1640          Bed Mobility Assessment/Treatment    Bed Mobility Assessment/Treatment supine-sit;sit-supine  -     Supine-Sit Luce (Bed Mobility) minimum assist (75% patient effort)  -     Sit-Supine Luce (Bed Mobility) moderate assist (50% patient effort)  -     Assistive Device (Bed Mobility) bed rails;head of bed elevated  -     Row Name 10/16/18 1640          Transfer Assessment/Treatment    Transfer Assessment/Treatment sit-stand transfer;stand-sit transfer  -     Sit-Stand Luce (Transfers) moderate assist (50% patient effort);2 person assist  -     Stand-Sit Luce (Transfers) moderate assist (50% patient effort)  -     Row Name 10/16/18 1640          Sit-Stand Transfer    Assistive Device (Sit-Stand Transfers) walker, front-wheeled  -     Row Name 10/16/18 1640          Stand-Sit Transfer    Assistive Device (Stand-Sit Transfers) walker, front-wheeled  -     Row Name 10/16/18 1640          Gait/Stairs Assessment/Training    Luce Level (Gait) moderate assist (50% patient effort);2 person assist  -     Assistive Device (Gait) walker, front-wheeled  -     Distance in Feet (Gait) 2 steps forward/backwards  -     Pattern (Gait) step-to  -     Deviations/Abnormal Patterns (Gait) antalgic;hernandez decreased;gait speed decreased;stride length decreased  -     Left Sided Gait Deviations weight shift ability decreased   limited WB tolerance, c/o feeling like knee will buckle  -     Row Name 10/16/18 1640          General ROM     GENERAL ROM COMMENTS WFL- guarded in LLE secondary to pain  -     Row Name 10/16/18 1640          MMT (Manual Muscle Testing)    General MMT Comments WFL  -     Row Name 10/16/18 1640          Plan of Care Review    Plan of Care Reviewed With patient  -     Row Name 10/16/18 1640          Physical Therapy Clinical Impression    Patient/Family Goals Statement (PT Clinical Impression) return to PLOF  -     Criteria for Skilled Interventions Met (PT Clinical Impression) treatment indicated  -     Impairments Found (describe specific impairments) ROM;gait, locomotion, and balance  -     Rehab Potential (PT Clinical Summary) fair, will monitor progress closely  -     Row Name 10/16/18 1640          Physical Therapy Goals    Bed Mobility Goal Selection (PT) bed mobility, PT goal 1  -     Transfer Goal Selection (PT) transfer, PT goal 1  -     Gait Training Goal Selection (PT) gait training, PT goal 1  -     Row Name 10/16/18 1640          Bed Mobility Goal 1 (PT)    Activity/Assistive Device (Bed Mobility Goal 1, PT) bed mobility activities, all  -KH     [place] Level/Cues Needed (Bed Mobility Goal 1, PT) supervision required  -KH     Time Frame (Bed Mobility Goal 1, PT) 3 days  -     Row Name 10/16/18 1640          Transfer Goal 1 (PT)    Activity/Assistive Device (Transfer Goal 1, PT) transfers, all;walker, rolling  -KH     [place] Level/Cues Needed (Transfer Goal 1, PT) contact guard assist  -KH     Time Frame (Transfer Goal 1, PT) 3 days  -     Row Name 10/16/18 1640          Gait Training Goal 1 (PT)    Activity/Assistive Device (Gait Training Goal 1, PT) gait (walking locomotion);walker, rolling  -KH     [place] Level (Gait Training Goal 1, PT) contact guard assist  -KH     Distance (Gait Goal 1, PT) 25ft  -KH     Time Frame (Gait Training Goal 1, PT) 3 days  -     Row Name 10/16/18 1640          Patient Education Goal (PT)    Activity (Patient Education Goal, PT) HEP  -      [place]/Cues/Accuracy (Memory Goal 2, PT) demonstrates adequately  -     Time Frame (Patient Education Goal, PT) 3 days  -     Row Name 10/16/18 1640          Positioning and Restraints    Pre-Treatment Position in bed  -     Post Treatment Position bed  -KH     In Bed fowlers;call light within reach;encouraged to call for assist;exit alarm on;notified nsg  -     Row Name 10/16/18 1640          Living Environment    Home Accessibility --   ramp  -       User Key  (r) = Recorded By, (t) = Taken By, (c) = Cosigned By    Initials Name Provider Type    Ayana Cody, PT Physical Therapist          Physical Therapy Education     Title: PT OT SLP Therapies (Done)     Topic: Physical Therapy (Done)     Point: Mobility training (Done)    Learning Progress Summary     Learner Status Readiness Method Response Comment Documented by    Patient Done Acceptance EMERITA TAN NR   10/16/18 1653          Point: Home exercise program (Done)    Learning Progress Summary     Learner Status Readiness Method Response Comment Documented by    Patient Done Acceptance EMERITA TAN NR   10/16/18 1653          Point: Body mechanics (Done)    Learning Progress Summary     Learner Status Readiness Method Response Comment Documented by    Patient Done Acceptance EMERITA TAN,MARIBELL   10/16/18 1653          Point: Precautions (Done)    Learning Progress Summary     Learner Status Readiness Method Response Comment Documented by    Patient Done Acceptance EMERITA TAN NR   10/16/18 1653                      User Key     Initials Effective Dates Name Provider Type [facility] 06/08/18 -  Ayana Noriega, PT Physical Therapist PT                PT Recommendation and Plan  Anticipated Discharge Disposition (PT): skilled nursing facility  Planned Therapy Interventions (PT Eval): bed mobility training, balance training, gait training, home exercise program, patient/family education, ROM (range of motion), strengthening, transfer  training  Therapy Frequency (PT Clinical Impression): daily  Outcome Summary/Treatment Plan (PT)  Anticipated Discharge Disposition (PT): skilled nursing facility  Plan of Care Reviewed With: patient  Outcome Summary: PT admitted from home with left fibula fx. Per RN, the PT order was updated to WBAT, no boot needed. Pt was able to stand at bedside with modA x2. He took 1-2 steps forward/backward, but reported he felt like L knee was going to buckle on the second step. He had limited WB tolerance secondary to pain in distal LLE. Pt is not safe to d/c home today and may benefit from a short SNF stay at discharge. Pt stated numerous times he would feel more comfortable in a boot. Notified RN, who was going to contact ortho. PT will follow up tomorrow to reassess and progress as tolerated.,          Outcome Measures     Row Name 10/16/18 1600             How much help from another person do you currently need...    Turning from your back to your side while in flat bed without using bedrails? 3  -KH      Moving from lying on back to sitting on the side of a flat bed without bedrails? 3  -KH      Moving to and from a bed to a chair (including a wheelchair)? 2  -KH      Standing up from a chair using your arms (e.g., wheelchair, bedside chair)? 2  -KH      Climbing 3-5 steps with a railing? 1  -KH      To walk in hospital room? 1  -KH      AM-PAC 6 Clicks Score 12  -KH         Functional Assessment    Outcome Measure Options AM-PAC 6 Clicks Basic Mobility (PT)  -        User Key  (r) = Recorded By, (t) = Taken By, (c) = Cosigned By    Initials Name Provider Type    Ayana Cody, PT Physical Therapist           Time Calculation:         PT Charges     Row Name 10/16/18 1659 10/16/18 1434          Time Calculation    Start Time 1610  -KH  --     Stop Time 1640  -KH  --     Time Calculation (min) 30 min  -DILLON  --     PT Received On 10/16/18  -DILLON  --     PT - Next Appointment 10/17/18  -DILLON 10/17/18  -DILLON     PT  Goal Re-Cert Due Date 10/19/18  -DILLON  --       User Key  (r) = Recorded By, (t) = Taken By, (c) = Cosigned By    Initials Name Provider Type    Ayana Cody, PT Physical Therapist        Therapy Suggested Charges     Code   Minutes Charges    None           Therapy Charges for Today     Code Description Service Date Service Provider Modifiers Qty    89094695833 HC PT EVAL MOD COMPLEXITY 2 10/16/2018 Ayana Noriega, PT GP 1    [id number]  PT THER SUPP EA 15 MIN 10/16/2018 Ayana Noriega, PT GP 1          PT G-Codes  Outcome Measure Options: AM-PAC 6 Clicks Basic Mobility (PT)  AM-PAC 6 Clicks Score: 12      Ayana Noriega PT  10/16/2018

## 2018-10-16 NOTE — ED PROVIDER NOTES
"Pt presents to the ED complaining of mechanical fall that occurred PTA at ED while ambulating with walker. Per pt, he has hx of \"legs buckling\". Pt confirms L anterior lower extremity pain from fall, denies hitting head, LOC, L hip pain, and L knee pain. Pt states he has hx of break to L shin with surgical repair in 12/2017.     On exam, pt has no tenderness to L hip and knee. Pt has tenderness just below L knee, to lateral and mid shin. Pt has erythema to anterior shin. Pt has intact distal pulses and normal sensation to feet.     I agree with [facility] plan to assess pt's XR prior to making further decisions about plan for care.     0205: Discussed pt's case further with midlevel and agreed with plan for admission of pt due to pt's inability to ambulate.     The PRINCESS and I have discussed this patient's history, physical exam, and treatment plan.  I have reviewed the documentation and personally had a face to face interaction with the patient. I affirm the documentation and agree with the treatment and plan.  The attached note describes my personal findings.    Documentation assistance provided by amari Cat for Dr. Bradley.  Information recorded by the jewelibissac was done at my direction and has been verified and validated by me.              Belle Cat  10/16/18 0206       Jaun Bradley MD  10/16/18 0207    "

## 2018-10-16 NOTE — PLAN OF CARE
Problem: Patient Care Overview  Goal: Plan of Care Review  Outcome: Ongoing (interventions implemented as appropriate)   10/16/18 0500   Coping/Psychosocial   Plan of Care Reviewed With patient   Plan of Care Review   Progress improving   OTHER   Outcome Summary HTN well controlled on PO med. pain well controlled. awaiting orders per Dr. Ruiz.       Problem: Fall Risk (Adult)  Goal: Absence of Fall  Outcome: Ongoing (interventions implemented as appropriate)   10/16/18 0500   Fall Risk (Adult)   Absence of Fall making progress toward outcome

## 2018-10-16 NOTE — H&P
History and Physical  Ariel Betancourt MD    Name: Nicanor Haley ADMIT: 10/15/2018   : 1950  PCP: Ariel Betancourt MD    MRN: [id number] LOS: 0 days   AGE/SEX: 68 y.o. male  ROOM: [facility]     Chief Complaint   Patient presents with   • Fall     Pt reports lost his balance at home, c/o L shin pain- redness and swelling noted +pedal pulse to L. Pt denies CP or dizziness prior to fall.   • Leg Injury       Subjective   Patient is a 68 y.o. male presents with the following...    History of Present Illness     Pt is a 68 y.o. male who presents complaining of left shin pain that began PTA s/p his legs buckling and then falling. Pt reports that he was using his walker while ambulating prior to the fall. Pt denies hitting his head, LOC, neck pain, back pain, or any other sustaining injuries. Pt reports hx of frequent falls and being unable to get himself off the ground without assistance. Pt also reports hx of surgery on his left shin in 2017 r/t prior fractures. There are no other complaints at this time.  Xray was done :     1. Comminuted fracture of the proximal fibular diaphysis.   2. This patient has old distal tibial and fibular fractures. These are   traversed by plate and screw fixation. 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   Similar findings were present on a CT performed May 24, 2018.         The patient admited to med surg floor.        Past Medical History:   Diagnosis Date   • Arthritis    • Colon cancer (CMS/HCC)     tubulovillous adenomas with adenocarcinoma in situ s/p right open colectomy   • Colon polyps 2014    1) cecal polyps: fragments of adenomatous polyp with low grade-dysplasia 2) ascending colon mass bx: fragments of tubulovillous adenoma w/ low grade dysplasia 3) transverse colon polyp: fragments of adenomatous polyp with low grade dysplasia 4) sigmoid polyp: fragments of adenomatous polyp with low grade dysplasia    • Gout    • Hyperlipemia    • Hypertension    • Hypothyroidism    • Sleep apnea      Past Surgical History:   Procedure Laterality Date   • CHOLECYSTECTOMY     • CHOLECYSTECTOMY WITH INTRAOPERATIVE CHOLANGIOGRAM N/A 11/10/2017    Procedure: CHOLECYSTECTOMY LAPAROSCOPIC INTRAOPERATIVE CHOLANGIOGRAM;  Surgeon: Elian Carnes MD;  Location: [facility];  Service:    • COLECTOMY PARTIAL / TOTAL Right 09/08/2014    Open right colectomy-Dr. Elian Carnes   • COLONOSCOPY N/A 3/15/2017    Procedure: COLONOSCOPY TO ANASTAMOSIS AND TI WITH COLD SNARE POLYPECTOMY ;  Surgeon: Elian Carnes MD;  Location: [facility] ENDOSCOPY;  Service:    • COLONOSCOPY N/A 08/06/2014    1 cm cecal polyp, 1 cm transverse polyp, 1 cm sigmoid colon polyp, mass in the ascending colon occupying 1/3 of the colonic circumference, mass proximal to the transverse colon occupying 1/4 colonic circumference-Dr. Elian Carnes   • FOOT SURGERY Right 2008, 2009   • GASTRIC BANDING  12/28/2009   • JOINT REPLACEMENT     • NOSE SURGERY     • ORIF TIBIA/FIBULA FRACTURES Left 12/17/2017    Procedure: TIBIA/FIBULA OPEN REDUCTION INTERNAL FIXATION;  Surgeon: Lai Acevedo Jr., MD;  Location: [facility];  Service:    • TOTAL KNEE ARTHROPLASTY Bilateral      1/2002-left knee 6/2002-right knee      History reviewed. No pertinent family history.  Social History   Substance Use Topics   • Smoking status: Never Smoker   • Smokeless tobacco: Never Used   • Alcohol use No      Comment: socially     Prescriptions Prior to Admission   Medication Sig Dispense Refill Last Dose   • calcium carbonate-cholecalciferol 500-400 MG-UNIT tablet tablet Take 1 tablet by mouth 2 (Two) Times a Day.   12/17/2017 at Unknown time   • hydrochlorothiazide (HYDRODIURIL) 25 MG tablet Take 25 mg by mouth Daily.   12/17/2017 at Unknown time   • HYDROmorphone (DILAUDID) 4 MG tablet Take 1 tablet by mouth Every 6 (Six) Hours As Needed for Moderate Pain . 20 tablet 0    • isosorbide  mononitrate (IMDUR) 30 MG 24 hr tablet Take 30 mg by mouth Daily.   12/17/2017 at Unknown time   • levothyroxine (SYNTHROID, LEVOTHROID) 125 MCG tablet Take 1 tablet by mouth Daily.      • metoprolol tartrate (LOPRESSOR) 25 MG tablet Take 25 mg by mouth 2 (Two) Times a Day. Pt unsure of dose   12/17/2017 at Unknown time   • probenecid (BENEMID) 500 MG tablet Take 500 mg by mouth 2 (Two) Times a Day.   12/17/2017 at Unknown time   • sertraline (ZOLOFT) 100 MG tablet Take 100 mg by mouth Daily. Pt unsure of dose   12/17/2017 at Unknown time   • simvastatin (ZOCOR) 40 MG tablet Take 40 mg by mouth Daily.   12/17/2017 at Unknown time   • traZODone (DESYREL) 150 MG tablet Take 150 mg by mouth Every Night.   12/16/2017 at Unknown time   • docusate sodium 100 MG capsule Take 100 mg by mouth Daily.      • polyethylene glycol (MIRALAX) packet Take 17 g by mouth Daily.        Allergies:  Keflex [cephalexin]    Review of Systems     Reviewed all 14 of the body systems    Objective    Vital Signs  Temp:  [98.2 °F (36.8 °C)-99.3 °F (37.4 °C)] 98.2 °F (36.8 °C)  Heart Rate:  [61-82] 61  Resp:  [18-20] 18  BP: (117-141)/(62-73) 120/63  SpO2:  [92 %-99 %] 99 %  on  Flow (L/min):  [2] 2;   Device (Oxygen Therapy): room air  Body mass index is 44.48 kg/m².    Physical Exam    Constitutional: He is oriented to person, place, and time. No distress.   HENT:   Head: Normocephalic and atraumatic.   Eyes: Pupils are equal, round, and reactive to light. EOM are normal.   Neck: Normal range of motion. Neck supple.   Cardiovascular: Normal rate, regular rhythm, normal heart sounds and intact distal pulses.  Exam reveals no gallop and no friction rub.    No murmur heard.  Pulses:       Posterior tibial pulses are 2+ on the right side, and 2+ on the left side.   Pulmonary/Chest: Effort normal and breath sounds normal. No respiratory distress. He has no decreased breath sounds. He has no wheezes. He has no rhonchi. He has no rales. He exhibits  no tenderness.   Abdominal: Soft. He exhibits no distension and no mass. There is no tenderness. There is no rebound and no guarding.   Musculoskeletal: Normal range of motion. He exhibits no edema.        Left lower leg: He exhibits tenderness (to the anterior LLE and proximal LLE).   Neurological: He is alert and oriented to person, place, and time. He has normal sensation and normal strength.   The patient is neurovascularly intact distally.   Skin: Skin is warm and dry. There is erythema (and warmth to the anterior LLE).   Psychiatric: Mood and affect normal.   Nursing note and vitals reviewed.           Results Review:   I reviewed the patient's new clinical results.    Assessment/Plan       Closed fracture of upper end of left fibula    HTN    Dyslipidemia    ITP    Assessment & Plan    See orders    I discussed the patients findings and my recommendations with patient and nursing staff.          Ariel Betancourt MD  10/16/18  12:02 PM

## 2018-10-16 NOTE — PLAN OF CARE
Problem: Patient Care Overview  Goal: Plan of Care Review   10/16/18 9926   OTHER   Outcome Summary Pt admitted from home with left fibula fx. Per RN, the PT order was updated to WBAT, no boot needed. Pt was able to stand at bedside with modA x2. He took 1-2 steps forward/backward, but reported he felt like L knee was going to buckle on the second step. He had limited WB tolerance secondary to pain in distal LLE. Pt is not safe to d/c home today and may benefit from a short SNF stay at discharge. Pt stated numerous times he would feel more comfortable in a boot. Notified RN, who was going to contact ortho. PT will follow up tomorrow to reassess and progress as tolerated.,

## 2018-10-16 NOTE — ED PROVIDER NOTES
EMERGENCY DEPARTMENT ENCOUNTER    CHIEF COMPLAINT  Chief Complaint: left shin pain  History given by: patient  History limited by: nothing  Room Number: 24/24  PMD: Ariel Betancourt MD      HPI:  Pt is a 68 y.o. male who presents complaining of left shin pain that began PTA s/p his legs buckling and then falling. Pt reports that he was using his walker while ambulating prior to the fall. Pt denies hitting his head, LOC, neck pain, back pain, or any other sustaining injuries. Pt reports hx of frequent falls and being unable to get himself off the ground without assistance. Pt also reports hx of surgery on his left shin in 12/2017 r/t prior fractures. There are no other complaints at this time.    Duration: began PTA  Onset: sudden  Timing: constant  Location: left shin  Radiation: none specified  Quality: pain  Intensity/Severity: moderate  Progression: not specified  Associated Symptoms: none specified  Aggravating Factors: fall  Alleviating Factors: none specified  Previous Episodes: none specified  Treatment before arrival: none specified    PAST MEDICAL HISTORY  Active Ambulatory Problems     Diagnosis Date Noted   • History of colon polyps 03/15/2017   • Acute pancreatitis due to calculus of common bile duct 11/07/2017   • Abdominal pain, generalized 11/07/2017   • Closed left tibial fracture 12/18/2017   • Closed bimalleolar fracture of left ankle 12/18/2017   • Left tibial fracture 12/20/2017     Resolved Ambulatory Problems     Diagnosis Date Noted   • Fall 12/17/2017     Past Medical History:   Diagnosis Date   • Arthritis    • Colon cancer (CMS/HCC) 2014   • Colon polyps 08/06/2014   • Gout    • Hyperlipemia    • Hypertension    • Hypothyroidism    • Sleep apnea        PAST SURGICAL HISTORY  Past Surgical History:   Procedure Laterality Date   • CHOLECYSTECTOMY     • CHOLECYSTECTOMY WITH INTRAOPERATIVE CHOLANGIOGRAM N/A 11/10/2017    Procedure: CHOLECYSTECTOMY LAPAROSCOPIC INTRAOPERATIVE CHOLANGIOGRAM;   Surgeon: Elian Carnes MD;  Location: [facility] OR;  Service:    • COLECTOMY PARTIAL / TOTAL Right 09/08/2014    Open right colectomy-Dr. Elian Carnes   • COLONOSCOPY N/A 3/15/2017    Procedure: COLONOSCOPY TO ANASTAMOSIS AND TI WITH COLD SNARE POLYPECTOMY ;  Surgeon: Elian Carnes MD;  Location: [facility] ENDOSCOPY;  Service:    • COLONOSCOPY N/A 08/06/2014    1 cm cecal polyp, 1 cm transverse polyp, 1 cm sigmoid colon polyp, mass in the ascending colon occupying 1/3 of the colonic circumference, mass proximal to the transverse colon occupying 1/4 colonic circumference-Dr. Elian Carnes   • FOOT SURGERY Right 2008, 2009   • GASTRIC BANDING  12/28/2009   • JOINT REPLACEMENT     • NOSE SURGERY     • ORIF TIBIA/FIBULA FRACTURES Left 12/17/2017    Procedure: TIBIA/FIBULA OPEN REDUCTION INTERNAL FIXATION;  Surgeon: Lai Acevedo Jr., MD;  Location: [facility];  Service:    • TOTAL KNEE ARTHROPLASTY Bilateral      1/2002-left knee 6/2002-right knee        FAMILY HISTORY  History reviewed. No pertinent family history.    SOCIAL HISTORY  Social History     Social History   • Marital status: Single     Spouse name: N/A   • Number of children: N/A   • Years of education: N/A     Occupational History   • Not on file.     Social History Main Topics   • Smoking status: Never Smoker   • Smokeless tobacco: Never Used   • Alcohol use No      Comment: socially   • Drug use: No   • Sexual activity: Defer     Other Topics Concern   • Not on file     Social History Narrative   • No narrative on file       ALLERGIES  Keflex [cephalexin]    REVIEW OF SYSTEMS  Review of Systems   Constitutional: Negative for activity change, appetite change and fever.   HENT: Negative for congestion and sore throat.    Eyes: Negative.    Respiratory: Negative for cough and shortness of breath.    Cardiovascular: Negative for chest pain and leg swelling.   Gastrointestinal: Negative for abdominal pain, diarrhea and vomiting.   Endocrine:  Negative.    Genitourinary: Negative for decreased urine volume and dysuria.   Musculoskeletal: Positive for myalgias (left shin). Negative for back pain and neck pain.   Skin: Negative for rash and wound.   Allergic/Immunologic: Negative.    Neurological: Negative for syncope, weakness, numbness and headaches.   Hematological: Negative.    Psychiatric/Behavioral: Negative.    All other systems reviewed and are negative.      PHYSICAL EXAM  ED Triage Vitals [10/15/18 2348]   Temp Heart Rate Resp BP SpO2   99.3 °F (37.4 °C) 82 20 131/72 95 %      Temp src Heart Rate Source Patient Position BP Location FiO2 (%)   Tympanic -- -- -- --       Physical Exam   Constitutional: He is oriented to person, place, and time. No distress.   HENT:   Head: Normocephalic and atraumatic.   Eyes: Pupils are equal, round, and reactive to light. EOM are normal.   Neck: Normal range of motion. Neck supple.   Cardiovascular: Normal rate, regular rhythm, normal heart sounds and intact distal pulses.  Exam reveals no gallop and no friction rub.    No murmur heard.  Pulses:       Posterior tibial pulses are 2+ on the right side, and 2+ on the left side.   Pulmonary/Chest: Effort normal and breath sounds normal. No respiratory distress. He has no decreased breath sounds. He has no wheezes. He has no rhonchi. He has no rales. He exhibits no tenderness.   Abdominal: Soft. He exhibits no distension and no mass. There is no tenderness. There is no rebound and no guarding.   Musculoskeletal: Normal range of motion. He exhibits no edema.        Left lower leg: He exhibits tenderness (to the anterior LLE and proximal LLE).   Neurological: He is alert and oriented to person, place, and time. He has normal sensation and normal strength.   The patient is neurovascularly intact distally.   Skin: Skin is warm and dry. There is erythema (and warmth to the anterior LLE).   Psychiatric: Mood and affect normal.   Nursing note and vitals reviewed.    Lab  Results (last 72 hours)     Procedure Component Value Units Date/Time    CBC & Differential [[id number]] Collected:  10/16/18 0212    Specimen:  Blood Updated:  10/16/18 0226    Narrative:       The following orders were created for panel order CBC & Differential.  Procedure                               Abnormality         Status                     ---------                               -----------         ------                     CBC Auto Differential[[id number]]        Abnormal            Final result                 Please view results for these tests on the individual orders.    Comprehensive Metabolic Panel [[id number]]  (Abnormal) Collected:  10/16/18 0212    Specimen:  Blood Updated:  10/16/18 0246     Glucose 120 (H) mg/dL      BUN 18 mg/dL      Creatinine 1.03 mg/dL      Sodium 138 mmol/L      Potassium 3.6 mmol/L      Chloride 95 (L) mmol/L      CO2 30.7 (H) mmol/L      Calcium 9.7 mg/dL      Total Protein 7.9 g/dL      Albumin 3.90 g/dL      ALT (SGPT) 19 U/L      AST (SGOT) 16 U/L      Alkaline Phosphatase 78 U/L      Total Bilirubin 0.7 mg/dL      eGFR Non African Amer 72 mL/min/1.73      Globulin 4.0 gm/dL      A/G Ratio 1.0 g/dL      BUN/Creatinine Ratio 17.5     Anion Gap 12.3 mmol/L     Magnesium [[id number]]  (Normal) Collected:  10/16/18 0212    Specimen:  Blood Updated:  10/16/18 0246     Magnesium 2.0 mg/dL     CK [[id number]]  (Normal) Collected:  10/16/18 0212    Specimen:  Blood Updated:  10/16/18 0246     Creatine Kinase 172 U/L     CBC Auto Differential [[id number]]  (Abnormal) Collected:  10/16/18 0212    Specimen:  Blood Updated:  10/16/18 0226     WBC 5.57 10*3/mm3      RBC 4.65 10*6/mm3      Hemoglobin 14.2 g/dL      Hematocrit 43.6 %      MCV 93.8 fL      MCH 30.5 pg      MCHC 32.6 g/dL      RDW 13.4 %      RDW-SD 45.1 fl      MPV 10.5 fL      Platelets 127 (L) 10*3/mm3      Neutrophil % 63.7 %      Lymphocyte % 19.0 (L) %      Monocyte % 15.6 (H) %      Eosinophil % 1.1 %      Basophil  % 0.2 %      Immature Grans % 0.4 %      Neutrophils, Absolute 3.55 10*3/mm3      Lymphocytes, Absolute 1.06 10*3/mm3      Monocytes, Absolute 0.87 10*3/mm3      Eosinophils, Absolute 0.06 10*3/mm3      Basophils, Absolute 0.01 10*3/mm3      Immature Grans, Absolute 0.02 10*3/mm3     Urinalysis With Microscopic If Indicated (No Culture) - Urine, Clean Catch [[id number]]  (Abnormal) Collected:  10/16/18 0230    Specimen:  Urine from Urine, Clean Catch Updated:  10/16/18 0239     Color, UA Yellow     Appearance, UA Clear     pH, UA <=5.0     Specific Gravity, UA 1.026     Glucose, UA Negative     Ketones, UA Trace (A)     Bilirubin, UA Negative     Blood, UA Negative     Protein, UA Negative     Leuk Esterase, UA Negative     Nitrite, UA Negative     Urobilinogen, UA 1.0 E.U./dL    Narrative:       Urine microscopic not indicated.        RADIOLOGY  XR Tibia Fibula 2 View Left   Final Result       1. Comminuted fracture of the proximal fibular diaphysis.   2. This patient has old distal tibial and fibular fractures. These are   traversed by plate and screw fixation. 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   Similar findings were present on a CT performed May 24, 2018.       This report was finalized on 10/16/2018 12:56 AM by Dr. Leslie Osorio M.D.               I ordered the above noted radiological studies. Interpreted by radiologist. Reviewed by me in PACS.       PROCEDURES  Procedures      PROGRESS AND CONSULTS  0002 Ordered Tibia Fibula XR for further evaluation.    0038 Reviewed the patient's history and workup with Dr. Kirk MD. After a bedside evaluation, they agree with the plan of care.    0139 Rechecked the patient who is resting comfortably and in NAD. His vital signs are stable. Discussed the Tibia Fibula XR that shows a comminuted fracture of the proximal fibular. Discussed the plan for admission for further management and physical  therapy since the patient is unable to bear weight or ambulate and lives alone. Pt understands and agrees with the plan, all questions answered.    0144 Ordered UA and blood work for further evaluation. Ordered Dilaudid for pain and Zofran for nausea.    0253 Placed call to Family Medicine for admission.    0319 Received a call from Dr. Ruiz, family medicine, and discussed pt's case. Dr. Ruiz agreed with plan to admit the patient to med/surg obs for further management.      MEDICAL DECISION MAKING  Results were reviewed/discussed with the patient and they were also made aware of online access. Pt also made aware that some labs, such as cultures, will not be resulted during ER visit and follow up with PMD is necessary.     MDM  Number of Diagnoses or Management Options  Closed fracture of proximal end of left fibula, unspecified fracture morphology, initial encounter:   Fall in home, initial encounter:   Immobility syndrome:      Amount and/or Complexity of Data Reviewed  Clinical lab tests: ordered and reviewed (Unremarkable)  Tests in the radiology section of CPT®: ordered and reviewed (Left Tibia Fibula XR shows comminuted fracture of the proximal fibular diaphysis. This patient has old distal tibial and fibular fractures. These are traversed by plate and screw fixation. The fibular fracture has healed, although the distal tibial fracture continues to show osseous nonunion. Similar findings were present on a CT performed May 24, 2018.)  Decide to obtain previous medical records or to obtain history from someone other than the patient: yes  Review and summarize past medical records: yes (The patient was last hospitalized in December 2017 for a closed left tibial fracture and a closed bimalleolar fracture of the left ankle d/t a fall.)  Discuss the patient with other providers: yes (Dr. Ruiz, family medicine)  Independent visualization of images, tracings, or specimens: yes    Patient Progress  Patient  progress: stable         DIAGNOSIS  Final diagnoses:   Closed fracture of proximal end of left fibula, unspecified fracture morphology, initial encounter   Immobility syndrome   Fall in home, initial encounter       DISPOSITION  ADMISSION TO MED/SURG OBS    Discussed treatment plan and reason for admission with pt/family and admitting physician.  Pt/family voiced understanding of the plan for admission for further testing/treatment as needed.     Latest Documented Vital Signs:  As of 3:20 AM  BP- 118/72 HR- 70 Temp- 99.3 °F (37.4 °C) (Tympanic) O2 sat- 97%    --  Documentation assistance provided by amari Martin for Lai Ocampo PA-C.  Information recorded by the scribe was done at my direction and has been verified and validated by me.     Angela Martin  10/16/18 0323       Lai Ocampo PA  10/16/18 0348

## 2018-10-16 NOTE — SIGNIFICANT NOTE
10/16/18 1434   Rehab Time/Intention   Evaluation Not Performed (PT order received for WBAT in [facility]. Per RN, boot has not been ordered. Will follow up tomorrow)   Rehab Treatment   Discipline physical therapist   Recommendation   PT - Next Appointment 10/17/18

## 2018-10-16 NOTE — PLAN OF CARE
Problem: Patient Care Overview  Goal: Plan of Care Review  Outcome: Ongoing (interventions implemented as appropriate)   10/16/18 0500 10/16/18 1230 10/16/18 6449   Coping/Psychosocial   Plan of Care Reviewed With --  patient --    Plan of Care Review   Progress improving --  --    OTHER   Outcome Summary --  --  pt admitted to unit from ER today for left fibulaFX. VSS. A/O X4. Awaiting ortho to see. pain is controlled with PO pain medication. educated pt on the importance of BP monitoring and the use of medication as ordered for HO HTN. will cont to monitor.      Goal: Individualization and Mutuality  Outcome: Ongoing (interventions implemented as appropriate)    Goal: Discharge Needs Assessment  Outcome: Ongoing (interventions implemented as appropriate)    Goal: Interprofessional Rounds/Family Conf  Outcome: Ongoing (interventions implemented as appropriate)      Problem: Fall Risk (Adult)  Goal: Identify Related Risk Factors and Signs and Symptoms  Outcome: Ongoing (interventions implemented as appropriate)    Goal: Absence of Fall  Outcome: Ongoing (interventions implemented as appropriate)      Problem: Fracture Orthopaedic (Adult)  Goal: Signs and Symptoms of Listed Potential Problems Will be Absent, Minimized or Managed (Fracture Orthopaedic)  Outcome: Ongoing (interventions implemented as appropriate)

## 2018-10-16 NOTE — CONSULTS
Patient: Nicanor Haley    YOB: 1950    Medical Record Number: [id number]     Consulting Physician:  Dr. Ruiz    Chief Complaints:  Left leg injury    History of Present Illness:     68 y.o. male patient seen at the request of Dr. Ruiz for his left leg.  He fell yesterday in his home, landing awkwardly on his left leg.  He is admitted for immobility and a left proximal fibula fracture.  He typically ambulates with a walker.  He tells me that this is a result of a poor outcome from bilateral total knee arthroplasties.  He also suffered a left leg fracture last year.  He underwent open reduction, internal fixation of an ankle and tibia fracture by Dr. Acevedo in December.  He continues to have some discomfort related to this as well.  Describes his current pain as severe, constant and aching.  The pain is worse with weightbearing.  Denies any other injuries or complaints.  Of note, he does report chronic weakness and numbness in his left foot which he says is related to his previous surgeries.    Allergies:   Allergies   Allergen Reactions   • Keflex [Cephalexin] Other (See Comments)     Pt reports that it makes him violent (12/17/17)        Home Medications:      Current Facility-Administered Medications:   •  acetaminophen (TYLENOL) tablet 650 mg, 650 mg, Oral, Q4H PRN, Ariel Betancourt MD  •  atorvastatin (LIPITOR) tablet 20 mg, 20 mg, Oral, Daily, Jim Ruiz MD, 20 mg at 10/16/18 1003  •  hydrochlorothiazide (HYDRODIURIL) tablet 25 mg, 25 mg, Oral, Daily, Jim Ruiz MD, 25 mg at 10/16/18 1336  •  HYDROmorphone (DILAUDID) tablet 4 mg, 4 mg, Oral, Q6H PRN, Jim Ruiz MD, 4 mg at 10/16/18 1336  •  isosorbide mononitrate (IMDUR) 24 hr tablet 30 mg, 30 mg, Oral, Daily, Jim Ruiz MD, 30 mg at 10/16/18 1004  •  levothyroxine (SYNTHROID, LEVOTHROID) tablet 125 mcg, 125 mcg, Oral, Daily, Jim Ruiz MD, 125 mcg at 10/16/18 1004  •  metoprolol tartrate (LOPRESSOR) tablet 25  mg, 25 mg, Oral, Q12H, Jim Ruiz MD, 25 mg at 10/16/18 1336  •  ondansetron ODT (ZOFRAN-ODT) disintegrating tablet 4 mg, 4 mg, Oral, Q6H PRN, Jim Ruiz MD  •  probenecid (BENEMID) tablet 500 mg, 500 mg, Oral, Q12H, Ariel Betancourt MD  •  [START ON 10/17/2018] sertraline (ZOLOFT) tablet 100 mg, 100 mg, Oral, Daily, Ariel Betancourt MD  •  Insert peripheral IV, , , Once **AND** sodium chloride 0.9 % flush 10 mL, 10 mL, Intravenous, PRN, Lai Ocampo PA  •  sodium chloride 0.9 % flush 3 mL, 3 mL, Intravenous, Q12H, Ariel Betancourt MD, 3 mL at 10/16/18 1515  •  sodium chloride 0.9 % flush 3-10 mL, 3-10 mL, Intravenous, PRN, Ariel Betancourt MD  •  sodium chloride 0.9 % infusion, 100 mL/hr, Intravenous, Continuous, Ariel Betancourt MD, Last Rate: 100 mL/hr at 10/16/18 1515, 100 mL/hr at 10/16/18 1515  •  traZODone (DESYREL) tablet 150 mg, 150 mg, Oral, Nightly, Jim Ruiz MD    Past Medical History:   Diagnosis Date   • Arthritis    • Colon cancer (CMS/HCC) 2014    tubulovillous adenomas with adenocarcinoma in situ s/p right open colectomy   • Colon polyps 08/06/2014    1) cecal polyps: fragments of adenomatous polyp with low grade-dysplasia 2) ascending colon mass bx: fragments of tubulovillous adenoma w/ low grade dysplasia 3) transverse colon polyp: fragments of adenomatous polyp with low grade dysplasia 4) sigmoid polyp: fragments of adenomatous polyp with low grade dysplasia   • Gout    • Hyperlipemia    • Hypertension    • Hypothyroidism    • Sleep apnea        Past Surgical History:   Procedure Laterality Date   • CHOLECYSTECTOMY     • CHOLECYSTECTOMY WITH INTRAOPERATIVE CHOLANGIOGRAM N/A 11/10/2017    Procedure: CHOLECYSTECTOMY LAPAROSCOPIC INTRAOPERATIVE CHOLANGIOGRAM;  Surgeon: Elian Carnes MD;  Location: [facility] OR;  Service:    • COLECTOMY PARTIAL / TOTAL Right 09/08/2014    Open right colectomy-Dr. Elian Carnes   • COLONOSCOPY N/A 3/15/2017    Procedure: COLONOSCOPY TO ANASTAMOSIS  AND TI WITH COLD SNARE POLYPECTOMY ;  Surgeon: Elian Carnes MD;  Location: [facility] ENDOSCOPY;  Service:    • COLONOSCOPY N/A 08/06/2014    1 cm cecal polyp, 1 cm transverse polyp, 1 cm sigmoid colon polyp, mass in the ascending colon occupying 1/3 of the colonic circumference, mass proximal to the transverse colon occupying 1/4 colonic circumference-Dr. Elian Carnes   • FOOT SURGERY Right 2008, 2009   • GASTRIC BANDING  12/28/2009   • JOINT REPLACEMENT     • NOSE SURGERY     • ORIF TIBIA/FIBULA FRACTURES Left 12/17/2017    Procedure: TIBIA/FIBULA OPEN REDUCTION INTERNAL FIXATION;  Surgeon: Lai Acevedo Jr., MD;  Location: [facility] MAIN OR;  Service:    • TOTAL KNEE ARTHROPLASTY Bilateral      1/2002-left knee 6/2002-right knee        Social History     Occupational History   • Not on file.     Social History Main Topics   • Smoking status: Never Smoker   • Smokeless tobacco: Never Used   • Alcohol use No      Comment: socially   • Drug use: No   • Sexual activity: Defer      Social History     Social History Narrative   • No narrative on file     History reviewed. No pertinent family history.    Review of Systems:      Constitutional: Denies fever, shaking or chills   Eyes: Denies change in visual acuity   HEENT: Denies nasal congestion or sore throat   Respiratory: Denies cough or shortness of breath   Cardiovascular: Denies chest pain or edema  Endocrine: Denies tremors, palpitations, intolerance of heat or cold, polyuria, polydipsia.  GI: Denies abdominal pain, nausea, vomiting, bloody stools or diarrhea  : Denies frequency, urgency, incontinence, retention, or nocturia.  Musculoskeletal: Denies numbness, tingling or loss of motor function except as above  Integument: Denies rash, lesion or ulceration   Neurologic: Denies headache or focal weakness, deficits  Heme: Denies spontaneous or excessive bleeding, epistaxis, hematuria, melena, fatigue, enlarged or tender lymph nodes.      All other pertinent  "positives and negatives as noted above in HPI.    Physical Exam: 68 y.o. male    Vitals:    10/16/18 0418 10/16/18 0732 10/16/18 1115 10/16/18 1542   BP: 139/73 120/63 139/78 122/69   BP Location: Right arm Right arm Left arm Left arm   Patient Position: Lying Sitting Lying Lying   Pulse: 67 61 73 64   Resp: 18 18 16 18   Temp: 98.2 °F (36.8 °C) 98.2 °F (36.8 °C) 98.2 °F (36.8 °C) 98.5 °F (36.9 °C)   TempSrc: Oral Oral Oral Oral   SpO2: 92% 99% 98% 97%   Weight: (!) 141 kg (310 lb)      Height: 177.8 cm (70\")        General:  Patient is awake and alert.  He is morbidly obese.  Appears in no acute distress or discomfort.    Psych:  Affect and demeanor are appropriate.    Eyes:  Conjunctiva and sclera appear grossly normal.  Eyes track well and EOM seem to be intact.    Ears:  No gross abnormalities.  Hearing adequate for the exam.    Cardiovascular:  Regular rate and rhythm.    Lungs:  Good chest expansion.  Breathing unlabored.    Lymph:  No palpable masses or adenopathy in the left lower extremity    Extremities:  The left leg is examined.  He has multiple well-healed incisions consistent with his surgical history.  He is tender over the proximal fibula.  There is no knee effusion.  The knee itself is nontender.  He has some mild tenderness distally along the tibia but nothing exquisite.  Knee motion is limited and uncomfortable.  Knee ligamentous exam was difficult and guarded.  There is no obvious instability but his exam is limited by discomfort.  There is no tenderness down at the ankle or foot.  He can weakly plantarflex the ankle and toes.  I could only get him to weakly dorsiflex his great toe.  I could not get him to dorsiflex his ankle.  He does have some diminished sensation over the dorsum of his foot which she says is his baseline.  He has a palpable posterior tibial pulse but I could not get a good dorsalis pedis pulse on him.  Brisk capillary refill.  Good skin turgor.         Radiology:   AP and " lateral views of the left tibia and fibula are reviewed to evaluate the patient's complaint.  These are compared to his previous x-rays on the system along with a CT scan from April.  The new x-rays show what appears to be an acute proximal fibula fracture.  This is nondisplaced.  He has a total knee arthroplasty in place.  There is no obvious loosening or acute abnormality with respect to the implant.  He has hardware in place distally consistent with his surgical history.  He appears to have a tibia nonunion.    Assessment/Plan:  1.  Left proximal fibula fracture  2.  Peroneal nerve neurapraxia  3.  Chronic left tibia nonunion    I recommend conservative treatment for the fibula fracture and neuropraxia.  I cannot tell how much of his nerve damage is new versus chronic.  He says that he did have some baseline weakness and numbness as result of his total knee arthroplasty.  We may have to investigate this further with nerve studies in the future.  He can bear weight as tolerated on the left leg.  I offered him a boot for comfort and to help hold up his foot.  He is not sure that he wants that.  We will let him try without the boot first.  He has a left tibia nonunion.  Ultimately, that hardware is going to break at some point.  I think he probably needs to consider revision fixation but that can certainly be some into consider in the future.  We need to get his fibula fracture healed first.  I'll have therapy evaluate him.  He has seen Dr. Acevedo in the past.  He is welcome to follow up with either myself or Dr. Acevedo in the future.  If he follows up with me, I want to see him back in 2 weeks.    Judah Lin MD    10/15/2018    CC to Ariel Betancourt MD

## 2018-10-16 NOTE — ED NOTES
Pt c/o mild pain of his L leg. Pt has redness, excoriation, and swelling noted to L shin. Neurovascularly intact. Reassurance given; call light in reach. Pts breathing even and unlabored. Pt appears in NAD at this time.        Tricia Owens, RN  10/16/18 0203

## 2018-10-17 PROBLEM — I10 HTN (HYPERTENSION): Status: ACTIVE | Noted: 2018-10-17

## 2018-10-17 LAB
ANION GAP SERPL CALCULATED.3IONS-SCNC: 9.3 MMOL/L
BUN BLD-MCNC: 16 MG/DL (ref 8–23)
BUN/CREAT SERPL: 17.4 (ref 7–25)
CALCIUM SPEC-SCNC: 8.9 MG/DL (ref 8.6–10.5)
CHLORIDE SERPL-SCNC: 98 MMOL/L (ref 98–107)
CO2 SERPL-SCNC: 30.7 MMOL/L (ref 22–29)
CREAT BLD-MCNC: 0.92 MG/DL (ref 0.76–1.27)
DEPRECATED RDW RBC AUTO: 46 FL (ref 37–54)
ERYTHROCYTE [DISTWIDTH] IN BLOOD BY AUTOMATED COUNT: 13.2 % (ref 11.5–14.5)
GFR SERPL CREATININE-BSD FRML MDRD: 82 ML/MIN/1.73
GLUCOSE BLD-MCNC: 89 MG/DL (ref 65–99)
HCT VFR BLD AUTO: 40.1 % (ref 40.4–52.2)
HGB BLD-MCNC: 12.8 G/DL (ref 13.7–17.6)
MCH RBC QN AUTO: 30.5 PG (ref 27–32.7)
MCHC RBC AUTO-ENTMCNC: 31.9 G/DL (ref 32.6–36.4)
MCV RBC AUTO: 95.5 FL (ref 79.8–96.2)
PLATELET # BLD AUTO: 119 10*3/MM3 (ref 140–500)
PMV BLD AUTO: 10.7 FL (ref 6–12)
POTASSIUM BLD-SCNC: 3.2 MMOL/L (ref 3.5–5.2)
RBC # BLD AUTO: 4.2 10*6/MM3 (ref 4.6–6)
SODIUM BLD-SCNC: 138 MMOL/L (ref 136–145)
WBC NRBC COR # BLD: 4.08 10*3/MM3 (ref 4.5–10.7)

## 2018-10-17 PROCEDURE — 80048 BASIC METABOLIC PNL TOTAL CA: CPT | Performed by: INTERNAL MEDICINE

## 2018-10-17 PROCEDURE — G0378 HOSPITAL OBSERVATION PER HR: HCPCS

## 2018-10-17 PROCEDURE — 85027 COMPLETE CBC AUTOMATED: CPT | Performed by: INTERNAL MEDICINE

## 2018-10-17 PROCEDURE — 97110 THERAPEUTIC EXERCISES: CPT

## 2018-10-17 PROCEDURE — 96361 HYDRATE IV INFUSION ADD-ON: CPT

## 2018-10-17 RX ORDER — LEVOTHYROXINE SODIUM 0.12 MG/1
125 TABLET ORAL 2 TIMES DAILY
Status: DISCONTINUED | OUTPATIENT
Start: 2018-10-17 | End: 2018-10-20 | Stop reason: HOSPADM

## 2018-10-17 RX ADMIN — PROBENECID 500 MG: 500 TABLET, FILM COATED ORAL at 21:00

## 2018-10-17 RX ADMIN — ATORVASTATIN CALCIUM 20 MG: 20 TABLET, FILM COATED ORAL at 09:04

## 2018-10-17 RX ADMIN — HYDROMORPHONE HYDROCHLORIDE 4 MG: 4 TABLET ORAL at 00:49

## 2018-10-17 RX ADMIN — TRAZODONE HYDROCHLORIDE 150 MG: 100 TABLET, FILM COATED ORAL at 21:00

## 2018-10-17 RX ADMIN — SODIUM CHLORIDE 100 ML/HR: 9 INJECTION, SOLUTION INTRAVENOUS at 06:33

## 2018-10-17 RX ADMIN — HYDROMORPHONE HYDROCHLORIDE 4 MG: 4 TABLET ORAL at 06:33

## 2018-10-17 RX ADMIN — ISOSORBIDE MONONITRATE 30 MG: 30 TABLET ORAL at 09:04

## 2018-10-17 RX ADMIN — LEVOTHYROXINE SODIUM 125 MCG: 125 TABLET ORAL at 21:00

## 2018-10-17 RX ADMIN — METOPROLOL TARTRATE 25 MG: 25 TABLET ORAL at 09:04

## 2018-10-17 RX ADMIN — HYDROMORPHONE HYDROCHLORIDE 4 MG: 4 TABLET ORAL at 14:53

## 2018-10-17 RX ADMIN — HYDROMORPHONE HYDROCHLORIDE 4 MG: 4 TABLET ORAL at 21:00

## 2018-10-17 RX ADMIN — LEVOTHYROXINE SODIUM 125 MCG: 125 TABLET ORAL at 09:04

## 2018-10-17 RX ADMIN — PROBENECID 500 MG: 500 TABLET, FILM COATED ORAL at 09:04

## 2018-10-17 RX ADMIN — SERTRALINE 100 MG: 100 TABLET, FILM COATED ORAL at 09:04

## 2018-10-17 RX ADMIN — HYDROCHLOROTHIAZIDE 25 MG: 25 TABLET ORAL at 09:04

## 2018-10-17 RX ADMIN — TRAZODONE HYDROCHLORIDE 150 MG: 100 TABLET, FILM COATED ORAL at 01:05

## 2018-10-17 RX ADMIN — METOPROLOL TARTRATE 25 MG: 25 TABLET ORAL at 21:00

## 2018-10-17 NOTE — PLAN OF CARE
Problem: Patient Care Overview  Goal: Plan of Care Review   10/17/18 0319   Coping/Psychosocial   Plan of Care Reviewed With patient   Plan of Care Review   Progress improving   OTHER   Outcome Summary Pt pain controlled with PO pain meds. VSS. Pt is WBAT to LLE but states may be more confortable with boot, felt like leg was going to give when ambulating. IVF infusing. Voiding per urinal. Will continue to monitor.

## 2018-10-17 NOTE — PROGRESS NOTES
Continued Stay Note  [facility]     Patient Name: Nicanor Haley  MRN: [id number]  Today's Date: 10/17/2018    Admit Date: 10/15/2018          Discharge Plan     Row Name 10/17/18 1654       Plan    Plan referral pending with [facility].     Patient/Family in Agreement with Plan yes    Plan Comments Follow up w/ pt at the bedside for SNF choice. Pt would like a referral w/ [facility] for TEZ. Silvia/Leroy notified to eval.               Discharge Codes    No documentation.           Mora Monsalve RN

## 2018-10-17 NOTE — NURSING NOTE
Called Dr. Betancourt regarding potassium level and unaddressed note. Reached voice mail and was directed to leave message, however, mailbox full.

## 2018-10-17 NOTE — PLAN OF CARE
Problem: Patient Care Overview  Goal: Plan of Care Review  Outcome: Ongoing (interventions implemented as appropriate)   10/17/18 4375   Coping/Psychosocial   Plan of Care Reviewed With patient   Plan of Care Review   Progress no change   OTHER   Outcome Summary pt unable to stand upright or clear bottom of bed today. Pt improved with activity tolerance and sitting balance as well as bed mobility

## 2018-10-17 NOTE — DISCHARGE PLACEMENT REQUEST
"Nikita Dee (68 y.o. Male)     Date of Birth Social Security Number Address Home Phone MRN    1950  [address]  [address]  [facility] [address] [phone] [id number]    Mandaen Marital Status          [facility] Single       Admission Date Admission Type Admitting Provider Attending Provider Department, Room/Bed    10/15/18 Emergency Ariel Betancourt MD Ljubic, Ivan, MD [address], P777/1    Discharge Date Discharge Disposition Discharge Destination                       Attending Provider:  Ariel Betancourt MD    Allergies:  Keflex [Cephalexin]    Isolation:  None   Infection:  None   Code Status:  CPR    Ht:  177.8 cm (70\")   Wt:  141 kg (310 lb)    Admission Cmt:  None   Principal Problem:  Closed fracture of upper end of left fibula [S82.832A]                 Active Insurance as of 10/15/2018     Primary Coverage     Payor Plan Insurance Group Employer/Plan Group    ANTHEM MEDICARE REPLACEMENT ANTHEM MEDICARE ADVANTAGE [id number]     Payor Plan Address Payor Plan Phone Number Effective From Effective To    [address] [phone] 4/1/2016     [facility] [id number]       Subscriber Name Subscriber Birth Date Member ID       NIKITA DEE 1950 [id number]           Secondary Coverage     Payor Plan Insurance Group Employer/Plan Group    [place]      Payor Plan Address Payor Plan Phone Number Effective From Effective To    [address] [phone] 3/15/2017     [facility] [id number]       Subscriber Name Subscriber Birth Date Member ID       NIKITA DEE 1950 [id number]                 Emergency Contacts      (Rel.) Home Phone Work Phone Mobile Phone    Gaviota Amezquita (Friend) [phone] -- --              "

## 2018-10-17 NOTE — PROGRESS NOTES
Progress Note  Ariel Betancourt MD    Patient ID:  Name:  Nicanor Haley  MRN:  [id number]  1950  68 y.o.  male            CC/Reason for visit:    HPI:    Vitals:  Vitals:    10/17/18 0300 10/17/18 0657 10/17/18 1056 10/17/18 1500   BP: 112/61 101/62 99/57 117/62   BP Location:  Right arm Right arm Right arm   Patient Position: Lying Lying Lying Lying   Pulse: 72 57 66 70   Resp: 16 16 16 16   Temp: 97.8 °F (36.6 °C) 97.7 °F (36.5 °C) 98.5 °F (36.9 °C) 97.8 °F (36.6 °C)   TempSrc:  Oral Oral Oral   SpO2: 96% 95% 96% 97%   Weight:       Height:               Body mass index is 44.48 kg/m².    Intake/Output Summary (Last 24 hours) at 10/17/18 1614  Last data filed at 10/17/18 1607   Gross per 24 hour   Intake                0 ml   Output             1750 ml   Net            -1750 ml       Exam:  GEN:  No distress, appears stated age  EYES:   EOM-i, anicteric sclera bilat  ENT:    External ears/nose normal, OP clear  NECK:  Supple, midline trachea  LUNGS: Clear breath sounds bilat, nonlabored breathing  CV:  Normal S1S2, without murmur, no edema  ABD:  Non tender, no enlarged liver or masses  EXT:  No cyanosis or clubbing    Scheduled meds:    atorvastatin 20 mg Oral Daily   hydrochlorothiazide 25 mg Oral Daily   isosorbide mononitrate 30 mg Oral Daily   levothyroxine 125 mcg Oral BID   metoprolol tartrate 25 mg Oral Q12H   probenecid 500 mg Oral Q12H   sertraline 100 mg Oral Daily   sodium chloride 3 mL Intravenous Q12H   traZODone 150 mg Oral Nightly     IV meds:                        sodium chloride 100 mL/hr Last Rate: 100 mL/hr (10/17/18 1453)       Data Review:   I reviewed the patient's medications and new clinical results.  Lab Results   Component Value Date    CALCIUM 8.9 10/17/2018       Results from last 7 days  Lab Units 10/17/18  0501 10/16/18  0212   SODIUM mmol/L 138 138   POTASSIUM mmol/L 3.2* 3.6   CHLORIDE mmol/L 98 95*   CO2 mmol/L 30.7* 30.7*   BUN mg/dL 16 18   CREATININE mg/dL 0.92 1.03   CALCIUM  mg/dL 8.9 9.7   BILIRUBIN mg/dL  --  0.7   ALK PHOS U/L  --  78   ALT (SGPT) U/L  --  19   AST (SGOT) U/L  --  16   GLUCOSE mg/dL 89 120*   WBC 10*3/mm3 4.08* 5.57   HEMOGLOBIN g/dL 12.8* 14.2   PLATELETS 10*3/mm3 119* 127*               Results from last 7 days  Lab Units 10/16/18  0212   CK TOTAL U/L 172           Estimated Creatinine Clearance: 108.7 mL/min (by C-G formula based on SCr of 0.92 mg/dL).    WEIGHTS:     Wt Readings from Last 1 Encounters:   10/16/18 0418 (!) 141 kg (310 lb)   10/16/18 0203 (!) 145 kg (320 lb)         ASSESSMENT:     Closed fracture of upper end of left fibula    Immobility syndrome    Post Polyo Syndrome    HTN    PLAN:    Cont w Pt   Await D/c planning to evaluate for SNU  D/w Nursing  Cont w PT/Pain Control/ DVT prophylaxis      Ariel Betancourt MD  10/17/2018

## 2018-10-18 LAB — POTASSIUM BLD-SCNC: 3.1 MMOL/L (ref 3.5–5.2)

## 2018-10-18 PROCEDURE — G0378 HOSPITAL OBSERVATION PER HR: HCPCS

## 2018-10-18 PROCEDURE — 96361 HYDRATE IV INFUSION ADD-ON: CPT

## 2018-10-18 PROCEDURE — 84132 ASSAY OF SERUM POTASSIUM: CPT | Performed by: ORTHOPAEDIC SURGERY

## 2018-10-18 RX ORDER — POTASSIUM CHLORIDE 750 MG/1
40 CAPSULE, EXTENDED RELEASE ORAL DAILY
Status: DISCONTINUED | OUTPATIENT
Start: 2018-10-18 | End: 2018-10-20 | Stop reason: HOSPADM

## 2018-10-18 RX ADMIN — ATORVASTATIN CALCIUM 20 MG: 20 TABLET, FILM COATED ORAL at 09:27

## 2018-10-18 RX ADMIN — POTASSIUM CHLORIDE 40 MEQ: 750 CAPSULE, EXTENDED RELEASE ORAL at 12:07

## 2018-10-18 RX ADMIN — HYDROMORPHONE HYDROCHLORIDE 4 MG: 4 TABLET ORAL at 02:51

## 2018-10-18 RX ADMIN — ISOSORBIDE MONONITRATE 30 MG: 30 TABLET ORAL at 09:27

## 2018-10-18 RX ADMIN — LEVOTHYROXINE SODIUM 125 MCG: 125 TABLET ORAL at 21:39

## 2018-10-18 RX ADMIN — HYDROMORPHONE HYDROCHLORIDE 4 MG: 4 TABLET ORAL at 21:39

## 2018-10-18 RX ADMIN — LEVOTHYROXINE SODIUM 125 MCG: 125 TABLET ORAL at 09:27

## 2018-10-18 RX ADMIN — METOPROLOL TARTRATE 25 MG: 25 TABLET ORAL at 21:39

## 2018-10-18 RX ADMIN — METOPROLOL TARTRATE 25 MG: 25 TABLET ORAL at 09:27

## 2018-10-18 RX ADMIN — PROBENECID 500 MG: 500 TABLET, FILM COATED ORAL at 09:27

## 2018-10-18 RX ADMIN — SERTRALINE 100 MG: 100 TABLET, FILM COATED ORAL at 09:27

## 2018-10-18 RX ADMIN — HYDROMORPHONE HYDROCHLORIDE 4 MG: 4 TABLET ORAL at 15:39

## 2018-10-18 RX ADMIN — HYDROMORPHONE HYDROCHLORIDE 4 MG: 4 TABLET ORAL at 09:27

## 2018-10-18 RX ADMIN — PROBENECID 500 MG: 500 TABLET, FILM COATED ORAL at 21:39

## 2018-10-18 RX ADMIN — TRAZODONE HYDROCHLORIDE 150 MG: 100 TABLET, FILM COATED ORAL at 21:41

## 2018-10-18 RX ADMIN — Medication 3 ML: at 21:40

## 2018-10-18 NOTE — PROGRESS NOTES
Progress Note  Ariel Betancourt MD    Patient ID:  Name:  Nicanor Haley  MRN:  [id number]  1950  68 y.o.  male            CC/Reason for visit:  Closed fracture of upper end of left fibula    Immobility syndrome    Post Polyo Syndrome    HTN    : interval history: pt voices adequate pain control, unable to ambulate with PT, CCP consulted, plans dc to rehab, voiding without difficulty, educated on the importance of BP monitoring related to history of HTN        Vitals:  Vitals:    10/17/18 2300 10/18/18 0248 10/18/18 0738 10/18/18 1204   BP: 151/76 115/65 107/57 111/63   BP Location: Left arm Left arm Left arm Left arm   Patient Position: Lying Lying Lying Lying   Pulse: 64 68 59 77   Resp: 16 16 16 16   Temp: 97.3 °F (36.3 °C) 97.7 °F (36.5 °C) 97.3 °F (36.3 °C) 97.3 °F (36.3 °C)   TempSrc:  Oral Oral Oral   SpO2: 97% 97% 96% 94%   Weight:       Height:               Body mass index is 44.48 kg/m².    Intake/Output Summary (Last 24 hours) at 10/18/18 1609  Last data filed at 10/18/18 1300   Gross per 24 hour   Intake             1298 ml   Output              900 ml   Net              398 ml       Exam:  GEN:  No distress, appears stated age  EYES:   EOM-i, anicteric sclera bilat  ENT:    External ears/nose normal, OP clear  NECK:  Supple, midline trachea  LUNGS: Clear breath sounds bilat, nonlabored breathing  CV:  Normal S1S2, without murmur, no edema  ABD:  Non tender, no enlarged liver or masses  EXT:  No cyanosis or clubbing    Scheduled meds:    atorvastatin 20 mg Oral Daily   hydrochlorothiazide 25 mg Oral Daily   isosorbide mononitrate 30 mg Oral Daily   levothyroxine 125 mcg Oral BID   metoprolol tartrate 25 mg Oral Q12H   potassium chloride 40 mEq Oral Daily   probenecid 500 mg Oral Q12H   sertraline 100 mg Oral Daily   sodium chloride 3 mL Intravenous Q12H   traZODone 150 mg Oral Nightly     IV meds:                        sodium chloride 100 mL/hr Last Rate: Stopped (10/18/18 1409)       Data Review:   I  reviewed the patient's medications and new clinical results.  Lab Results   Component Value Date    CALCIUM 8.9 10/17/2018       Results from last 7 days  Lab Units 10/18/18  1027 10/17/18  0501 10/16/18  0212   SODIUM mmol/L  --  138 138   POTASSIUM mmol/L 3.1* 3.2* 3.6   CHLORIDE mmol/L  --  98 95*   CO2 mmol/L  --  30.7* 30.7*   BUN mg/dL  --  16 18   CREATININE mg/dL  --  0.92 1.03   CALCIUM mg/dL  --  8.9 9.7   BILIRUBIN mg/dL  --   --  0.7   ALK PHOS U/L  --   --  78   ALT (SGPT) U/L  --   --  19   AST (SGOT) U/L  --   --  16   GLUCOSE mg/dL  --  89 120*   WBC 10*3/mm3  --  4.08* 5.57   HEMOGLOBIN g/dL  --  12.8* 14.2   PLATELETS 10*3/mm3  --  119* 127*               Results from last 7 days  Lab Units 10/16/18  0212   CK TOTAL U/L 172           Estimated Creatinine Clearance: 108.7 mL/min (by C-G formula based on SCr of 0.92 mg/dL).    WEIGHTS:     Wt Readings from Last 1 Encounters:   10/16/18 0418 (!) 141 kg (310 lb)   10/16/18 0203 (!) 145 kg (320 lb)         ASSESSMENT:     Closed fracture of upper end of left fibula    Immobility syndrome    HTN (hypertension)      PLAN:    Cont w Pt   Await D/c planning to evaluate for SNU  D/w Nursing  Cont w PT/Pain Control/ DVT prophylaxis  Await [facility] acceptance              Ariel Betancourt MD  10/18/2018

## 2018-10-18 NOTE — PLAN OF CARE
Problem: Patient Care Overview  Goal: Plan of Care Review  Outcome: Ongoing (interventions implemented as appropriate)   10/18/18 0151   Coping/Psychosocial   Plan of Care Reviewed With patient   Plan of Care Review   Progress no change   OTHER   Outcome Summary patient resting comfortably through night, pain controlled at this time, patient educated on b/p monitoring     Goal: Individualization and Mutuality  Outcome: Ongoing (interventions implemented as appropriate)    Goal: Discharge Needs Assessment  Outcome: Ongoing (interventions implemented as appropriate)    Goal: Interprofessional Rounds/Family Conf  Outcome: Ongoing (interventions implemented as appropriate)      Problem: Fall Risk (Adult)  Goal: Absence of Fall  Outcome: Ongoing (interventions implemented as appropriate)      Problem: Fracture Orthopaedic (Adult)  Goal: Signs and Symptoms of Listed Potential Problems Will be Absent, Minimized or Managed (Fracture Orthopaedic)  Outcome: Ongoing (interventions implemented as appropriate)      Problem: Skin Injury Risk (Adult)  Goal: Skin Health and Integrity  Outcome: Ongoing (interventions implemented as appropriate)

## 2018-10-18 NOTE — PROGRESS NOTES
Continued Stay Note  [facility]     Patient Name: Nicanor Haley  MRN: [id number]  Today's Date: 10/18/2018    Admit Date: 10/15/2018          Discharge Plan     Row Name 10/18/18 0819       Plan    Plan referral pending diony/ [facility].     Patient/Family in Agreement with Plan yes    Plan Comments Pt is now requesting a referral w/ [facility], he mistakenly requested [facility]. Kirstin/[facility] notified and will eval. Kirstin aware pt is ready for d/c. CCP will follow up later this AM.               Discharge Codes    No documentation.           Mora Monsalve RN     no

## 2018-10-18 NOTE — PROGRESS NOTES
Continued Stay Note  [facility]     Patient Name: Nicanor Haley  MRN: [id number]  Today's Date: 10/18/2018    Admit Date: 10/15/2018          Discharge Plan     Row Name 10/18/18 1618       Plan    Plan [facility]     Plan Comments [facility]. Ana Luisa dawkins approved.               Discharge Codes    No documentation.           Mora Monsalve RN

## 2018-10-18 NOTE — PLAN OF CARE
Problem: Patient Care Overview  Goal: Plan of Care Review  Outcome: Ongoing (interventions implemented as appropriate)   10/17/18 1958   Coping/Psychosocial   Plan of Care Reviewed With patient   OTHER   Outcome Summary pt voices adequate pain control, unable to ambulate with PT, CCP consulted, plans dc to rehab, voiding without difficulty, educated on the importance of BP monitoring related to history of HTN     Goal: Individualization and Mutuality  Outcome: Ongoing (interventions implemented as appropriate)   10/17/18 1958   Individualization   Patient Specific Preferences none stated   Patient Specific Goals (Include Timeframe) pain control and improved immobility     Goal: Discharge Needs Assessment  Outcome: Ongoing (interventions implemented as appropriate)   10/17/18 0200   Discharge Needs Assessment   Readmission Within the Last 30 Days no previous admission in last 30 days   Concerns to be Addressed discharge planning   Patient/Family Anticipates Transition to home with help/services   Patient/Family Anticipated Services at Transition home health care;rehabilitation services   Transportation Concerns car, none   Transportation Anticipated family or friend will provide   Anticipated Changes Related to Illness none   Equipment Needed After Discharge walker, rolling   Discharge Facility/Level of Care Needs rehabilitation facility;home with home health   Disability   Equipment Currently Used at Home walker, rolling       Problem: Fall Risk (Adult)  Goal: Identify Related Risk Factors and Signs and Symptoms  Outcome: Outcome(s) achieved Date Met: 10/17/18   10/17/18 1958   Fall Risk (Adult)   Related Risk Factors (Fall Risk) gait/mobility problems;culprit medication(s);environment unfamiliar   Signs and Symptoms (Fall Risk) presence of risk factors     Goal: Absence of Fall  Outcome: Ongoing (interventions implemented as appropriate)   10/17/18 1958   Fall Risk (Adult)   Absence of Fall achieves outcome        Problem: Fracture Orthopaedic (Adult)  Goal: Signs and Symptoms of Listed Potential Problems Will be Absent, Minimized or Managed (Fracture Orthopaedic)  Outcome: Ongoing (interventions implemented as appropriate)   10/17/18 1958   Goal/Outcome Evaluation   Problems Assessed (Orthopaedic Fracture) all   Problems Present (Orthopaedic Fracture) functional deficit/self-care deficit;pain;situational response       Problem: Skin Injury Risk (Adult)  Goal: Identify Related Risk Factors and Signs and Symptoms  Outcome: Outcome(s) achieved Date Met: 10/17/18   10/17/18 1958   Skin Injury Risk (Adult)   Related Risk Factors (Skin Injury Risk) mobility impaired     Goal: Skin Health and Integrity  Outcome: Ongoing (interventions implemented as appropriate)   10/17/18 1958   Skin Injury Risk (Adult)   Skin Health and Integrity making progress toward outcome

## 2018-10-19 LAB
ANION GAP SERPL CALCULATED.3IONS-SCNC: 10.1 MMOL/L
BUN BLD-MCNC: 15 MG/DL (ref 8–23)
BUN/CREAT SERPL: 18.1 (ref 7–25)
CALCIUM SPEC-SCNC: 9.2 MG/DL (ref 8.6–10.5)
CHLORIDE SERPL-SCNC: 103 MMOL/L (ref 98–107)
CO2 SERPL-SCNC: 27.9 MMOL/L (ref 22–29)
CREAT BLD-MCNC: 0.83 MG/DL (ref 0.76–1.27)
GFR SERPL CREATININE-BSD FRML MDRD: 92 ML/MIN/1.73
GLUCOSE BLD-MCNC: 95 MG/DL (ref 65–99)
MAGNESIUM SERPL-MCNC: 2.1 MG/DL (ref 1.6–2.4)
POTASSIUM BLD-SCNC: 3.7 MMOL/L (ref 3.5–5.2)
SODIUM BLD-SCNC: 141 MMOL/L (ref 136–145)

## 2018-10-19 PROCEDURE — 80048 BASIC METABOLIC PNL TOTAL CA: CPT | Performed by: SPECIALIST

## 2018-10-19 PROCEDURE — G0378 HOSPITAL OBSERVATION PER HR: HCPCS

## 2018-10-19 PROCEDURE — 97110 THERAPEUTIC EXERCISES: CPT

## 2018-10-19 PROCEDURE — 83735 ASSAY OF MAGNESIUM: CPT | Performed by: SPECIALIST

## 2018-10-19 RX ORDER — LEVOTHYROXINE SODIUM 0.12 MG/1
125 TABLET ORAL 2 TIMES DAILY
Start: 2018-10-19 | End: 2023-03-29 | Stop reason: HOSPADM

## 2018-10-19 RX ADMIN — HYDROCHLOROTHIAZIDE 25 MG: 25 TABLET ORAL at 08:44

## 2018-10-19 RX ADMIN — METOPROLOL TARTRATE 25 MG: 25 TABLET ORAL at 20:46

## 2018-10-19 RX ADMIN — LEVOTHYROXINE SODIUM 125 MCG: 125 TABLET ORAL at 08:44

## 2018-10-19 RX ADMIN — METOPROLOL TARTRATE 25 MG: 25 TABLET ORAL at 08:43

## 2018-10-19 RX ADMIN — Medication 3 ML: at 08:45

## 2018-10-19 RX ADMIN — PROBENECID 500 MG: 500 TABLET, FILM COATED ORAL at 08:44

## 2018-10-19 RX ADMIN — POTASSIUM CHLORIDE 40 MEQ: 750 CAPSULE, EXTENDED RELEASE ORAL at 08:44

## 2018-10-19 RX ADMIN — TRAZODONE HYDROCHLORIDE 150 MG: 100 TABLET, FILM COATED ORAL at 20:46

## 2018-10-19 RX ADMIN — HYDROMORPHONE HYDROCHLORIDE 4 MG: 4 TABLET ORAL at 09:20

## 2018-10-19 RX ADMIN — LEVOTHYROXINE SODIUM 125 MCG: 125 TABLET ORAL at 20:46

## 2018-10-19 RX ADMIN — HYDROMORPHONE HYDROCHLORIDE 4 MG: 4 TABLET ORAL at 03:42

## 2018-10-19 RX ADMIN — HYDROMORPHONE HYDROCHLORIDE 4 MG: 4 TABLET ORAL at 15:17

## 2018-10-19 RX ADMIN — SERTRALINE 100 MG: 100 TABLET, FILM COATED ORAL at 08:44

## 2018-10-19 RX ADMIN — PROBENECID 500 MG: 500 TABLET, FILM COATED ORAL at 20:46

## 2018-10-19 RX ADMIN — HYDROMORPHONE HYDROCHLORIDE 4 MG: 4 TABLET ORAL at 21:48

## 2018-10-19 RX ADMIN — ATORVASTATIN CALCIUM 20 MG: 20 TABLET, FILM COATED ORAL at 08:43

## 2018-10-19 RX ADMIN — Medication 3 ML: at 20:46

## 2018-10-19 RX ADMIN — ISOSORBIDE MONONITRATE 30 MG: 30 TABLET ORAL at 08:46

## 2018-10-19 NOTE — PLAN OF CARE
Problem: Patient Care Overview  Goal: Plan of Care Review  Outcome: Ongoing (interventions implemented as appropriate)   10/19/18 8465   Coping/Psychosocial   Plan of Care Reviewed With patient   OTHER   Outcome Summary giovana tfer to chair, but unable to utilize wx for safe assist; incr post lean req more assist for supine to sit

## 2018-10-19 NOTE — THERAPY TREATMENT NOTE
Acute Care - Physical Therapy Treatment Note  [facility]     Patient Name: Nicanor Haley  : 1950  MRN: [id number]  Today's Date: 10/19/2018             Admit Date: 10/15/2018    Visit Dx:    ICD-10-CM ICD-9-CM   1. Closed fracture of proximal end of left fibula, unspecified fracture morphology, initial encounter S82.832A 823.01   2. Immobility syndrome M62.3 728.3   3. Fall in home, initial encounter W19.XXXA E888.9    Y92.009 E849.0   4. Difficulty walking R26.2 719.7     Patient Active Problem List   Diagnosis   • History of colon polyps   • Acute pancreatitis due to calculus of common bile duct   • Abdominal pain, generalized   • Closed left tibial fracture   • Closed bimalleolar fracture of left ankle   • Left tibial fracture   • Closed fracture of upper end of left fibula   • Immobility syndrome   • HTN (hypertension)       Therapy Treatment          Rehabilitation Treatment Summary     Row Name 10/19/18 1226             Treatment Time/Intention    Discipline physical therapy assistant  -      Document Type therapy note (daily note)  -      Subjective Information complains of;weakness;fatigue;pain  -      Care Plan Review patient/other agree to care plan  -2      Existing Precautions/Restrictions fall  -      Treatment Considerations/Comments has boot w/OOB, WBAT  -2      Recorded by [] Ary Newell, PTA 10/19/18 1538  [2] Ary Newell, PTA 10/19/18 1557      Row Name 10/19/18 1226             Bed Mobility Assessment/Treatment    Supine-Sit Guthrie (Bed Mobility) minimum assist (75% patient effort)  -      Assistive Device (Bed Mobility) bed rails  -      Comment (Bed Mobility) much improved, very minimal post lean  -      Recorded by [] Ary Newell, PTA 10/19/18 1557      Row Name 10/19/18 1226             Bed-Chair Transfer    Bed-Chair Guthrie (Transfers) 2 person assist;maximum assist (25% patient effort)  -      Assistive Device (Bed-Chair  "Transfers) walker, front-wheeled   used as \"rail\" not as wx, turned sideways  -      Recorded by [] Ary Newell, [facility] 10/19/18 1557      Row Name 10/19/18 1226             Sit-Stand Transfer    Sit-Stand Keith (Transfers) 2 person assist;maximum assist (25% patient effort)  -      Assistive Device (Sit-Stand Transfers) walker, front-wheeled  -JM2      Recorded by [] Ary Newell, [facility] 10/19/18 1538  [JM2] Ary Newell, [facility] 10/19/18 1557      Row Name 10/19/18 1226             Stand-Sit Transfer    Stand-Sit Keith (Transfers) 2 person assist;minimum assist (75% patient effort)   pt sat quickly w/o warning  -      Assistive Device (Stand-Sit Transfers) walker, front-wheeled   had to support rwx -pt used to get to chair as a \"rail\"  -      Recorded by [] Ary Newell, [facility] 10/19/18 1867      Row Name 10/19/18 1226             Positioning and Restraints    Pre-Treatment Position in bed  -      In Chair reclined;call light within reach;encouraged to call for assist  -      Recorded by [] Ary Newell, [facility] 10/19/18 7357      Row Name 10/19/18 1226             Pain Scale: Numbers Pre/Post-Treatment    Pain Scale: Numbers, Pretreatment 6/10  -JM      Pain Location - Side Left  -JM      Pain Location - Orientation lower  -JM      Pain Location extremity  -JM      Pain Intervention(s) Medication (See MAR);Elevated  -      Recorded by [] Ary Newell, [facility] 10/19/18 1531        User Key  (r) = Recorded By, (t) = Taken By, (c) = Cosigned By    Initials Name Effective Dates Discipline     Ary Newell, [facility] 03/07/18 -  PT                     Physical Therapy Education     Title: PT OT SLP Therapies (Done)     Topic: Physical Therapy (Done)     Point: Mobility training (Done)    Learning Progress Summary     Learner Status Readiness Method Response Comment Documented by    Patient Done Acceptance E,TB,EMERITA SARABIA   10/19/18 7360     Done Acceptance E,TB,EMERITA SARABIA,MARIBELL   " 10/18/18 1556     Done Acceptance E,TB VU,DU  CW 10/17/18 1517     Done Acceptance E,D VU,NR   10/16/18 1653          Point: Home exercise program (Done)    Learning Progress Summary     Learner Status Readiness Method Response Comment Documented by    Patient Done Acceptance E,TB,D VU   10/19/18 1559     Done Acceptance E,TB,D VU,NR   10/18/18 1556     Done Acceptance E,TB VU,DU  CW 10/17/18 1517     Done Acceptance E,D VU,NR   10/16/18 1653          Point: Body mechanics (Done)    Learning Progress Summary     Learner Status Readiness Method Response Comment Documented by    Patient Done Acceptance E,TB,D VU   10/19/18 1559     Done Acceptance E,TB,D VU,NR   10/18/18 1556     Done Acceptance E,TB VU,DU  CW 10/17/18 1517     Done Acceptance E,D VU,NR   10/16/18 1653          Point: Precautions (Done)    Learning Progress Summary     Learner Status Readiness Method Response Comment Documented by    Patient Done Acceptance E,TB,D VU   10/19/18 1559     Done Acceptance E,TB,D VU,NR   10/18/18 1556     Done Acceptance E,TB VU,DU  CW 10/17/18 1517     Done Acceptance E,D VU,NR   10/16/18 1653                      User Key     Initials Effective Dates Name Provider Type Discipline     06/08/18 -  Ayana Noriega, PT Physical Therapist PT     03/07/18 -  Ary Newell PTA Physical Therapy Assistant PT     03/07/18 -  Johnson Oates PTA Physical Therapy Assistant PT                    PT Recommendation and Plan     Plan of Care Reviewed With: patient  Progress: improving  Outcome Summary: improved use of wx for tfer but pivot only, unable to take steps; decr pain today w/rx          Outcome Measures     Row Name 10/19/18 1500 10/18/18 1500 10/17/18 1500       How much help from another person do you currently need...    Turning from your back to your side while in flat bed without using bedrails? 3  -JM 3  -JM 3  -CW    Moving from lying on back to sitting on the side of a flat  bed without bedrails? 3  -JM 2  -JM 3  -CW    Moving to and from a bed to a chair (including a wheelchair)? 2  - 2  -JM 1  -CW    Standing up from a chair using your arms (e.g., wheelchair, bedside chair)? 2  -JM 2  -JM 1  -CW    Climbing 3-5 steps with a railing? 1  - 1  -JM 1  -CW    To walk in hospital room? 1  - 1  - 1  -CW    AM-PAC 6 Clicks Score 12  - 11  - 10  -CW       Functional Assessment    Outcome Measure Options  --  -- AM-PAC 6 Clicks Basic Mobility (PT)  -CW      User Key  (r) = Recorded By, (t) = Taken By, (c) = Cosigned By    Initials Name Provider Type    Ary Serrato PTA Physical Therapy Assistant    Johnson Moreno PTA Physical Therapy Assistant           Time Calculation:         PT Charges     Row Name 10/19/18 1533             Time Calculation    Start Time 1149  -      Stop Time 1218  -      Time Calculation (min) 29 min  -      PT Received On 10/19/18  -ASHLI      PT - Next Appointment 10/20/18  -ASHLI         Time Calculation- PT    Total Timed Code Minutes- PT 25 minute(s)  -        User Key  (r) = Recorded By, (t) = Taken By, (c) = Cosigned By    Initials Name Provider Type    Ary Serrato PTA Physical Therapy Assistant        Therapy Suggested Charges     Code   Minutes Charges    None           Therapy Charges for Today     Code Description Service Date Service Provider Modifiers Qty    95199476298 HC PT THER PROC EA 15 MIN 10/19/2018 Ary Newell PTA GP 2    12517906766 HC PT THER PROC EA 15 MIN 10/19/2018 Ary Newell PTA GP 2          PT G-Codes  Outcome Measure Options: AM-PAC 6 Clicks Basic Mobility (PT)  AM-PAC 6 Clicks Score: 12    Ary Newell PTA  10/19/2018

## 2018-10-19 NOTE — PLAN OF CARE
Problem: Patient Care Overview  Goal: Plan of Care Review  Outcome: Ongoing (interventions implemented as appropriate)   10/19/18 0233   Coping/Psychosocial   Plan of Care Reviewed With patient   Plan of Care Review   Progress improving   OTHER   Outcome Summary HTN well controlled on po meds. pain well controlled.        Problem: Fall Risk (Adult)  Goal: Absence of Fall  Outcome: Ongoing (interventions implemented as appropriate)   10/19/18 0233   Fall Risk (Adult)   Absence of Fall making progress toward outcome

## 2018-10-19 NOTE — PLAN OF CARE
Problem: Patient Care Overview  Goal: Plan of Care Review  Outcome: Ongoing (interventions implemented as appropriate)   10/19/18 7380   Coping/Psychosocial   Plan of Care Reviewed With patient   Plan of Care Review   Progress improving   OTHER   Outcome Summary improved use of wx for tfer but pivot only, unable to take steps; decr pain today w/rx

## 2018-10-19 NOTE — THERAPY TREATMENT NOTE
Acute Care - Physical Therapy Treatment Note  [facility]     Patient Name: Nicanor Haley  : 1950  MRN: [id number]  Today's Date: 10/19/2018             Admit Date: 10/15/2018    Visit Dx:    ICD-10-CM ICD-9-CM   1. Closed fracture of proximal end of left fibula, unspecified fracture morphology, initial encounter S82.832A 823.01   2. Immobility syndrome M62.3 728.3   3. Fall in home, initial encounter W19.XXXA E888.9    Y92.009 E849.0   4. Difficulty walking R26.2 719.7     Patient Active Problem List   Diagnosis   • History of colon polyps   • Acute pancreatitis due to calculus of common bile duct   • Abdominal pain, generalized   • Closed left tibial fracture   • Closed bimalleolar fracture of left ankle   • Left tibial fracture   • Closed fracture of upper end of left fibula   • Immobility syndrome   • HTN (hypertension)       Therapy Treatment                   Physical Therapy Education     Title: PT OT SLP Therapies (Done)     Topic: Physical Therapy (Done)     Point: Mobility training (Done)    Learning Progress Summary     Learner Status Readiness Method Response Comment Documented by    Patient Done Acceptance E,TB,D VU,NR   10/18/18 1556     Done Acceptance E,TB MANDEEP SARABIA 10/17/18 1517     Done Acceptance E,D VU,NR   10/16/18 1653          Point: Home exercise program (Done)    Learning Progress Summary     Learner Status Readiness Method Response Comment Documented by    Patient Done Acceptance E,TB,D VU,NR   10/18/18 1556     Done Acceptance E,TB MANDEEP SARABIA 10/17/18 1517     Done Acceptance E,D VU,NR   10/16/18 1653          Point: Body mechanics (Done)    Learning Progress Summary     Learner Status Readiness Method Response Comment Documented by    Patient Done Acceptance E,TB,D VU,NR   10/18/18 1556     Done Acceptance E,TB VU,MANDEEP ADAMS 10/17/18 1517     Done Acceptance E,D VU,NR   10/16/18 1653          Point: Precautions (Done)    Learning Progress Summary     Learner Status  Readiness Method Response Comment Documented by    Patient Done Acceptance E,TB,MARIBELL CLAYTON   10/18/18 1556     Done Acceptance E,TB MANDEEP SARABIA   10/17/18 1517     Done Acceptance E,MARIBELL CLAYTON   10/16/18 1653                      User Key     Initials Effective Dates Name Provider Type [facility] 06/08/18 -  Ayana Noriega, PT Physical Therapist PT     03/07/18 -  Ary Newell PTA Physical Therapy Assistant PT     03/07/18 -  Johnson Oates, DEBORAH Physical Therapy Assistant PT                    PT Recommendation and Plan     Plan of Care Reviewed With: patient  Outcome Summary: giovana tfer to chair, but unable to utilize wx for safe assist; incr post lean req more assist for supine to sit           Outcome Measures     Row Name 10/18/18 1500 10/17/18 1500 10/16/18 1600       How much help from another person do you currently need...    Turning from your back to your side while in flat bed without using bedrails? 3  - 3  -CW 3  -KH    Moving from lying on back to sitting on the side of a flat bed without bedrails? 2  - 3  -CW 3  -KH    Moving to and from a bed to a chair (including a wheelchair)? 2  - 1  - 2  -KH    Standing up from a chair using your arms (e.g., wheelchair, bedside chair)? 2  - 1  -CW 2  -KH    Climbing 3-5 steps with a railing? 1  - 1  - 1  -KH    To walk in hospital room? 1  - 1  - 1  -KH    AM-PAC 6 Clicks Score 11  - 10  - 12  -KH       Functional Assessment    Outcome Measure Options  -- AM-PAC 6 Clicks Basic Mobility (PT)  -CW AM-PAC 6 Clicks Basic Mobility (PT)  -      User Key  (r) = Recorded By, (t) = Taken By, (c) = Cosigned By    Initials Name Provider Type     Ayana Noriega, PT Physical Therapist    Ary Serrato, DEBORAH Physical Therapy Assistant    Johnson Moreno, DEBORAH Physical Therapy Assistant           Time Calculation:     Therapy Suggested Charges     Code   Minutes Charges    None           Therapy Charges for Today      Code Description Service Date Service Provider Modifiers Qty    [id number] HC PT THER PROC EA 15 MIN 10/19/2018 Ary Newell, PTA GP 2          PT G-Codes  Outcome Measure Options: AM-PAC 6 Clicks Basic Mobility (PT)  AM-PAC 6 Clicks Score: 11    Ary Newell PTA  10/19/2018

## 2018-10-19 NOTE — DISCHARGE SUMMARY
Discharge Summary  Ariel Betancourt MD     NAME: Nicanor Haley ADMIT: 10/15/2018   : 1950  PCP: Ariel Betancourt MD    MRN: [id number] LOS: 0 days   AGE/SEX: 68 y.o. male  ROOM: P777/1     Date of Admission:  10/15/2018  Date of Discharge:  10/19/2018    PCP: Ariel Betancourt MD    CHIEF COMPLAINT  Fall (Pt reports lost his balance at home, c/o L shin pain- redness and swelling noted +pedal pulse to L. Pt denies CP or dizziness prior to fall.) and Leg Injury      DISCHARGE DIAGNOSIS  Active Hospital Problems    Diagnosis Date Noted   • **Closed fracture of upper end of left fibula [S82.832A] 10/16/2018   • HTN (hypertension) [I10] 10/17/2018   • Immobility syndrome [M62.3] 10/16/2018      Resolved Hospital Problems    Diagnosis Date Noted Date Resolved   No resolved problems to display.       SECONDARY DIAGNOSES  Past Medical History:   Diagnosis Date   • Arthritis    • Colon cancer (CMS/HCC)     tubulovillous adenomas with adenocarcinoma in situ s/p right open colectomy   • Colon polyps 2014    1) cecal polyps: fragments of adenomatous polyp with low grade-dysplasia 2) ascending colon mass bx: fragments of tubulovillous adenoma w/ low grade dysplasia 3) transverse colon polyp: fragments of adenomatous polyp with low grade dysplasia 4) sigmoid polyp: fragments of adenomatous polyp with low grade dysplasia   • Gout    • Hyperlipemia    • Hypertension    • Hypothyroidism    • Sleep apnea        CONSULTS   Consults     Date and Time Order Name Status Description    10/16/2018 1128 Inpatient Orthopedic Surgery Consult      10/16/2018 0252 Family Medicine Consult Completed           PROCEDURES PERFORMED  Imaging Results (last 72 hours)     ** No results found for the last 72 hours. **            HOSPITAL COURSE      Pt is a 68 y.o. male who presents complaining of left shin pain that began PTA s/p his legs buckling and then falling. Pt reports that he was using his walker while ambulating prior to the fall. Pt  denies hitting his head, LOC, neck pain, back pain, or any other sustaining injuries. Pt reports hx of frequent falls and being unable to get himself off the ground without assistance. Pt also reports hx of surgery on his left shin in 12/2017 r/t prior fractures. There are no other complaints at this time.  Xray was done :     1. Comminuted fracture of the proximal fibular diaphysis.   2. This patient has old distal tibial and fibular fractures. These are   traversed by plate and screw fixation. 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   Similar findings were present on a CT performed May 24, 2018.         The patient was an observation at [facility]. Dr Akers saw the patient in consult and wrote:      1.  Left proximal fibula fracture  2.  Peroneal nerve neurapraxia  3.  Chronic left tibia nonunion     I recommend conservative treatment for the fibula fracture and neuropraxia.  I cannot tell how much of his nerve damage is new versus chronic.  He says that he did have some baseline weakness and numbness as result of his total knee arthroplasty.  We may have to investigate this further with nerve studies in the future.  He can bear weight as tolerated on the left leg.  I offered him a boot for comfort and to help hold up his foot.  He is not sure that he wants that.  We will let him try without the boot first.  He has a left tibia nonunion.  Ultimately, that hardware is going to break at some point.  I think he probably needs to consider revision fixation but that can certainly be some into consider in the future.  We need to get his fibula fracture healed first.  I'll have therapy evaluate him.  He has seen Dr. Acevedo in the past.  He is welcome to follow up with either myself or Dr. Acevedo in the future.  If he follows up with me, I want to see him back in 2 weeks.     Judah Lin MD    PHYSICAL EXAM  Objective     Constitutional:  He is oriented to person, place, and time. No distress.   HENT:   Head: Normocephalic and atraumatic.   Eyes: Pupils are equal, round, and reactive to light. EOM are normal.   Neck: Normal range of motion. Neck supple.   Cardiovascular: Normal rate, regular rhythm, normal heart sounds and intact distal pulses.  Exam reveals no gallop and no friction rub.    No murmur heard.  Pulses:       Posterior tibial pulses are 2+ on the right side, and 2+ on the left side.   Pulmonary/Chest: Effort normal and breath sounds normal. No respiratory distress. He has no decreased breath sounds. He has no wheezes. He has no rhonchi. He has no rales. He exhibits no tenderness.   Abdominal: Soft. He exhibits no distension and no mass. There is no tenderness. There is no rebound and no guarding.   Musculoskeletal: Normal range of motion. He exhibits no edema.        Left lower leg: He exhibits tenderness (to the anterior LLE and proximal LLE).   Neurological: He is alert and oriented to person, place, and time. He has normal sensation and normal strength.   The patient is neurovascularly intact distally.   Skin: Skin is warm and dry. There is erythema (and warmth to the anterior LLE).   Psychiatric: Mood and affect normal.   Nursing note and vitals reviewed.        CONDITION ON DISCHARGE  Stable.      DISCHARGE DISPOSITION   Skilled Nursing Facility (DC - External)      DISCHARGE MEDICATIONS     Discharge Medications      Changes to Medications      Instructions Start Date   levothyroxine 125 MCG tablet  Commonly known as:  SYNTHROID, LEVOTHROID  What changed:  when to take this   125 mcg, Oral, 2 Times Daily         Continue These Medications      Instructions Start Date   calcium carbonate-cholecalciferol 500-400 MG-UNIT tablet tablet   1 tablet, Oral, 2 Times Daily      docusate sodium 100 MG capsule   100 mg, Oral, Daily      hydrochlorothiazide 25 MG tablet  Commonly known as:  HYDRODIURIL   25 mg, Oral, Daily      HYDROmorphone 4  MG tablet  Commonly known as:  DILAUDID   4 mg, Oral, Every 6 Hours PRN      isosorbide mononitrate 30 MG 24 hr tablet  Commonly known as:  IMDUR   30 mg, Oral, Daily      metoprolol tartrate 25 MG tablet  Commonly known as:  LOPRESSOR   25 mg, Oral, 2 Times Daily, Pt unsure of dose       polyethylene glycol packet  Commonly known as:  MIRALAX   17 g, Oral, Daily      probenecid 500 MG tablet  Commonly known as:  BENEMID   500 mg, Oral, 2 Times Daily      sertraline 100 MG tablet  Commonly known as:  ZOLOFT   100 mg, Oral, Daily, Pt unsure of dose       simvastatin 40 MG tablet  Commonly known as:  ZOCOR   40 mg, Oral, Daily      traZODone 150 MG tablet  Commonly known as:  DESYREL   150 mg, Oral, Nightly            No future appointments.  Additional Instructions for the Follow-ups that You Need to Schedule     Discharge Follow-up with PCP    As directed      Currently Documented PCP:  Ariel Betancourt MD  PCP Phone Number:  [phone]    Follow Up Details:  Dr Asia mahajan D/C from [facility]            Contact information for follow-up providers     Ariel Betancourt MD .    Specialties:  Internal Medicine, Hospitalist  Why:  Dr Betancourt post D/C from SNU  Contact information:  [address] [address]  [phone]                   Contact information for after-discharge care     Destination     [facility] .    Specialty:  Skilled Nursing Facility  Contact information:  [facility] [facility] [address] [phone]                             TEST  RESULTS PENDING AT DISCHARGE         Ariel Betancourt MD  10/19/18  4:44 PM

## 2018-10-20 VITALS
OXYGEN SATURATION: 99 % | TEMPERATURE: 98 F | DIASTOLIC BLOOD PRESSURE: 70 MMHG | BODY MASS INDEX: 44.38 KG/M2 | RESPIRATION RATE: 18 BRPM | HEART RATE: 70 BPM | WEIGHT: 310 LBS | SYSTOLIC BLOOD PRESSURE: 138 MMHG | HEIGHT: 70 IN

## 2018-10-20 PROCEDURE — G0378 HOSPITAL OBSERVATION PER HR: HCPCS

## 2018-10-20 RX ADMIN — HYDROMORPHONE HYDROCHLORIDE 4 MG: 4 TABLET ORAL at 10:03

## 2018-10-20 RX ADMIN — HYDROMORPHONE HYDROCHLORIDE 4 MG: 4 TABLET ORAL at 16:40

## 2018-10-20 RX ADMIN — PROBENECID 500 MG: 500 TABLET, FILM COATED ORAL at 08:49

## 2018-10-20 RX ADMIN — HYDROMORPHONE HYDROCHLORIDE 4 MG: 4 TABLET ORAL at 03:51

## 2018-10-20 RX ADMIN — LEVOTHYROXINE SODIUM 125 MCG: 125 TABLET ORAL at 08:50

## 2018-10-20 RX ADMIN — ATORVASTATIN CALCIUM 20 MG: 20 TABLET, FILM COATED ORAL at 08:50

## 2018-10-20 RX ADMIN — POTASSIUM CHLORIDE 40 MEQ: 750 CAPSULE, EXTENDED RELEASE ORAL at 08:49

## 2018-10-20 RX ADMIN — Medication 3 ML: at 08:49

## 2018-10-20 RX ADMIN — METOPROLOL TARTRATE 25 MG: 25 TABLET ORAL at 08:50

## 2018-10-20 RX ADMIN — HYDROCHLOROTHIAZIDE 25 MG: 25 TABLET ORAL at 08:49

## 2018-10-20 RX ADMIN — SERTRALINE 100 MG: 100 TABLET, FILM COATED ORAL at 08:50

## 2018-10-20 RX ADMIN — ISOSORBIDE MONONITRATE 30 MG: 30 TABLET ORAL at 08:50

## 2018-10-20 NOTE — PLAN OF CARE
Problem: Patient Care Overview  Goal: Plan of Care Review  Outcome: Ongoing (interventions implemented as appropriate)   10/20/18 0552   Coping/Psychosocial   Plan of Care Reviewed With patient   Plan of Care Review   Progress improving   OTHER   Outcome Summary VSS. Periphral vascular assessment WNL. No s/sx compartment syndrome. Up with max assist and walking boot. Using urinal at bedise. Reporting pain decreased in intensity and well managed with PO PRN analgesic. Plans to d/c to rehab today       Problem: Fall Risk (Adult)  Goal: Absence of Fall  Outcome: Ongoing (interventions implemented as appropriate)      Problem: Fracture Orthopaedic (Adult)  Goal: Signs and Symptoms of Listed Potential Problems Will be Absent, Minimized or Managed (Fracture Orthopaedic)  Outcome: Ongoing (interventions implemented as appropriate)      Problem: Skin Injury Risk (Adult)  Goal: Skin Health and Integrity  Outcome: Ongoing (interventions implemented as appropriate)

## 2018-10-20 NOTE — PLAN OF CARE
Problem: Patient Care Overview  Goal: Plan of Care Review  Outcome: Ongoing (interventions implemented as appropriate)   10/19/18 1850   Coping/Psychosocial   Plan of Care Reviewed With patient   Plan of Care Review   Progress improving   OTHER   Outcome Summary Patient transfering with x3 assist with stand and pivot and rolling walker. Vitals are stable and voiding function is intact, bm x1 today. Pain controlled with po meds. Patient to d/c to [facility] tomorrow via ambulance.        Problem: Fall Risk (Adult)  Goal: Absence of Fall  Outcome: Ongoing (interventions implemented as appropriate)   10/19/18 1850   Fall Risk (Adult)   Absence of Fall achieves outcome       Problem: Fracture Orthopaedic (Adult)  Goal: Signs and Symptoms of Listed Potential Problems Will be Absent, Minimized or Managed (Fracture Orthopaedic)  Outcome: Ongoing (interventions implemented as appropriate)   10/19/18 1850   Goal/Outcome Evaluation   Problems Assessed (Orthopaedic Fracture) all   Problems Present (Orthopaedic Fracture) pain;functional deficit/self-care deficit;skin integrity impairment       Problem: Skin Injury Risk (Adult)  Goal: Skin Health and Integrity  Outcome: Ongoing (interventions implemented as appropriate)   10/19/18 1850   Skin Injury Risk (Adult)   Skin Health and Integrity making progress toward outcome

## 2018-10-20 NOTE — PROGRESS NOTES
Continued Stay Note  [facility]     Patient Name: Nicanor Haley  MRN: [id number]  Today's Date: 10/20/2018    Admit Date: 10/15/2018          Discharge Plan     Row Name 10/20/18 1115       Plan    Plan [facility]- skilled level    Plan Comments Spoke with Kirstin at [facility] at [phone] and she states patient will need new [employer] [facility]F 6 click form as patient was explected to be discharge on 10/19/18.  New 6 click [place] to SNF form faxed to Kirstin at [facility] at [phone] with fax confirmation received - 6 click score on 10/19/18 was 12.  Kirstin states patient has bed at [facility] today.... Roma Cardoza RN,CCP               Discharge Codes    No documentation.       Expected Discharge Date and Time     Expected Discharge Date Expected Discharge Time    Oct 20, 2018             Roma Cardoza RN

## 2018-12-28 ENCOUNTER — OFFICE VISIT (OUTPATIENT)
Dept: ORTHOPEDIC SURGERY | Facility: CLINIC | Age: 68
End: 2018-12-28

## 2018-12-28 VITALS — HEIGHT: 69 IN | BODY MASS INDEX: 46.65 KG/M2 | WEIGHT: 315 LBS | TEMPERATURE: 97.3 F

## 2018-12-28 DIAGNOSIS — Z98.890 STATUS POST OPEN REDUCTION WITH INTERNAL FIXATION (ORIF) OF FRACTURE OF ANKLE: Primary | ICD-10-CM

## 2018-12-28 DIAGNOSIS — Z87.81 STATUS POST OPEN REDUCTION WITH INTERNAL FIXATION (ORIF) OF FRACTURE OF ANKLE: Primary | ICD-10-CM

## 2018-12-28 PROCEDURE — 99024 POSTOP FOLLOW-UP VISIT: CPT | Performed by: ORTHOPAEDIC SURGERY

## 2018-12-28 PROCEDURE — 73610 X-RAY EXAM OF ANKLE: CPT | Performed by: ORTHOPAEDIC SURGERY

## 2019-01-07 ENCOUNTER — TELEPHONE (OUTPATIENT)
Dept: ORTHOPEDIC SURGERY | Facility: CLINIC | Age: 69
End: 2019-01-07

## 2019-01-07 DIAGNOSIS — Z87.81 STATUS POST OPEN REDUCTION WITH INTERNAL FIXATION (ORIF) OF FRACTURE OF ANKLE: Primary | ICD-10-CM

## 2019-01-07 DIAGNOSIS — Z98.890 STATUS POST OPEN REDUCTION WITH INTERNAL FIXATION (ORIF) OF FRACTURE OF ANKLE: Primary | ICD-10-CM

## 2019-01-07 NOTE — TELEPHONE ENCOUNTER
Referral for Dr. Cabrera in [facility] w/ wife to have patient return my call. [phone] 01/07/2019

## 2019-04-11 ENCOUNTER — TRANSCRIBE ORDERS (OUTPATIENT)
Dept: ADMINISTRATIVE | Facility: HOSPITAL | Age: 69
End: 2019-04-11

## 2019-04-11 DIAGNOSIS — M81.0 OSTEOPOROSIS, SENILE: Primary | ICD-10-CM

## 2019-04-15 ENCOUNTER — OFFICE VISIT (OUTPATIENT)
Dept: SURGERY | Facility: CLINIC | Age: 69
End: 2019-04-15

## 2019-04-15 VITALS — OXYGEN SATURATION: 98 % | WEIGHT: 315 LBS | BODY MASS INDEX: 45.1 KG/M2 | HEIGHT: 70 IN | HEART RATE: 85 BPM

## 2019-04-15 DIAGNOSIS — K59.09 OTHER CONSTIPATION: ICD-10-CM

## 2019-04-15 DIAGNOSIS — R10.84 GENERALIZED ABDOMINAL PAIN: Primary | ICD-10-CM

## 2019-04-15 DIAGNOSIS — Z86.010 HX OF ADENOMATOUS COLONIC POLYPS: ICD-10-CM

## 2019-04-15 DIAGNOSIS — R19.7 DIARRHEA, UNSPECIFIED TYPE: ICD-10-CM

## 2019-04-15 DIAGNOSIS — Z86.004 HX OF CARCINOMA IN SITU OF COLON: ICD-10-CM

## 2019-04-15 PROCEDURE — 99215 OFFICE O/P EST HI 40 MIN: CPT | Performed by: SURGERY

## 2019-04-15 RX ORDER — COLESEVELAM 180 1/1
1875 TABLET ORAL 2 TIMES DAILY WITH MEALS
Qty: 60 TABLET | Refills: 11 | Status: SHIPPED | OUTPATIENT
Start: 2019-04-15 | End: 2020-03-16

## 2019-04-15 NOTE — PROGRESS NOTES
SUMMARY (A/P):    69-year-old gentleman with:  -Alternating constipation/diarrhea with postprandial urgency.  This is likely related to his previous colectomy and cholecystectomy.  I have recommended and prescribed WelChol 625 mg p.o. twice daily to see if this will help with his bowel function.  -Personal history of carcinoma in situ of ascending colon, personal history of tubulovillous polyps, personal history of adenomatous polyps, and new onset of generalized abdominal pain.  For all these reasons I recommended proceeding with surveillance colonoscopy, he understands the nature of the procedure and the risks as well as the fact that the risks are increased due to morbid obesity and multiple associated medical comorbidities.      CC:    Abdominal pain    HPI:    69-year-old gentleman presents with generalized intermittent abdominal pain that occurs daily for about 30 minutes and is moderate at its worst.  This is been going on for 3 months.  There is no associated nausea or vomiting.  He does report alternating constipation and diarrhea as well as postprandial urgency.    PSH:    -Laparoscopic cholecystectomy with cholangiogram 11/10/2017 for gallstone pancreatitis  -Colonoscopy with snare polypectomy of transverse colon polyp and cold biopsy removal of rectal polyp 3/15/2017 (transverse colon polyp was tubulovillous adenoma, rectal polyp was hyperplastic)  -Open right colectomy 9/8/2014 (final pathology demonstrated 2 separate tubulovillous adenomas measuring 2.7 cm, both focally demonstrating adenocarcinoma in situ)  -Colonoscopy 9/20/2015 with snare polypectomy x3 and biopsy of ascending colon mass (ascending colon mass was tubulovillous adenoma, remaining polyps were adenomatous)  -Laparoscopic gastric banding 12/28/2009  -Left total knee arthroplasty 2002  -Right total knee arthroplasty 2002    PMH:    Hypertension  Carcinoma in situ ascending colon  Tubulovillous polyps  Adenomatous  polyps  Arthritis  Depression  Hypertension  Hypothyroidism  Hyperlipidemia  Sleep apnea  Nephrolithiasis  Morbid obesity    FAMILY HISTORY:    Negative for colorectal cancer    SOCIAL HISTORY:   Denies tobacco use  Occasional alcohol use    ALLERGIES: reviewed, in Epic    MEDICATIONS: reviewed, in Epic    ROS:  No chest pain or shortness of air.  All other systems reviewed and negative other than presenting complaints.    RADIOLOGY/ENDOSCOPY:    -CT abdomen pelvis 11/7/2017: Moderately severe pancreatitis and gallstones  -Surgical cholangiogram 11/10/2017 with no abnormality    PHYSICAL EXAM:   Constitutional: Well-developed well-nourished, no acute distress  Vital signs: Discussed with patient increased perioperative risks associated with obesity including increased risks of DVT, infection, seromas, poor wound healing and hernias (with abdominal surgery).  Eyes: Conjunctiva normal, sclera nonicteric  ENMT: Hearing grossly normal, oral mucosa moist  Neck: Supple, no palpable mass, normal thyroid, trachea midline  Respiratory: Clear to auscultation, normal inspiratory effort  Cardiovascular: Regular rate, no murmur, no carotid bruit, no peripheral edema, no jugular venous distention  Gastrointestinal: Soft, obese, well-healed midline abdominal incision nontender, no palpable mass, no hepatosplenomegaly, negative for incisional hernia, bowel sounds normal  Lymphatics (palpable nodes):  cervical-negative, axillary-negative  Skin:  Warm, dry, no rash on visualized skin surfaces  Musculoskeletal: Symmetric strength, normal gait  Psychiatric: Alert and oriented ×3, normal affect     LUCILLE CORTEZ M.D.

## 2019-04-16 ENCOUNTER — TELEPHONE (OUTPATIENT)
Dept: SURGERY | Facility: CLINIC | Age: 69
End: 2019-04-16

## 2019-04-16 NOTE — TELEPHONE ENCOUNTER
[facility] called the office to ask for verbal auth to change the Welchol Rx quantity from yesterday from 60 tabs to 180 tabs since the prescription calls for the patient to take 3 tabs BID. Gave  pharmacist verbal auth to change.

## 2019-04-22 ENCOUNTER — APPOINTMENT (OUTPATIENT)
Dept: BONE DENSITY | Facility: HOSPITAL | Age: 69
End: 2019-04-22

## 2019-04-29 ENCOUNTER — HOSPITAL ENCOUNTER (OUTPATIENT)
Dept: BONE DENSITY | Facility: HOSPITAL | Age: 69
Discharge: HOME | End: 2019-04-29
Admitting: SPECIALIST

## 2019-04-29 DIAGNOSIS — M81.0 OSTEOPOROSIS, SENILE: ICD-10-CM

## 2019-04-29 PROCEDURE — 77080 DXA BONE DENSITY AXIAL: CPT

## 2019-05-01 ENCOUNTER — ANESTHESIA EVENT (OUTPATIENT)
Dept: GASTROENTEROLOGY | Facility: HOSPITAL | Age: 69
End: 2019-05-01

## 2019-05-01 ENCOUNTER — HOSPITAL ENCOUNTER (OUTPATIENT)
Facility: HOSPITAL | Age: 69
Setting detail: HOSPITAL OUTPATIENT SURGERY
Discharge: HOME | End: 2019-05-01
Attending: SURGERY | Admitting: SURGERY

## 2019-05-01 ENCOUNTER — ANESTHESIA (OUTPATIENT)
Dept: GASTROENTEROLOGY | Facility: HOSPITAL | Age: 69
End: 2019-05-01

## 2019-05-01 VITALS
OXYGEN SATURATION: 94 % | BODY MASS INDEX: 47.74 KG/M2 | SYSTOLIC BLOOD PRESSURE: 126 MMHG | WEIGHT: 315 LBS | TEMPERATURE: 97.8 F | RESPIRATION RATE: 16 BRPM | HEIGHT: 68 IN | DIASTOLIC BLOOD PRESSURE: 84 MMHG | HEART RATE: 67 BPM

## 2019-05-01 DIAGNOSIS — K59.09 OTHER CONSTIPATION: ICD-10-CM

## 2019-05-01 DIAGNOSIS — R19.7 DIARRHEA, UNSPECIFIED TYPE: ICD-10-CM

## 2019-05-01 DIAGNOSIS — R10.84 GENERALIZED ABDOMINAL PAIN: ICD-10-CM

## 2019-05-01 DIAGNOSIS — Z86.010 HX OF ADENOMATOUS COLONIC POLYPS: ICD-10-CM

## 2019-05-01 PROCEDURE — 45385 COLONOSCOPY W/LESION REMOVAL: CPT | Performed by: SURGERY

## 2019-05-01 PROCEDURE — 25010000002 PROPOFOL 10 MG/ML EMULSION: Performed by: ANESTHESIOLOGY

## 2019-05-01 PROCEDURE — 45380 COLONOSCOPY AND BIOPSY: CPT | Performed by: SURGERY

## 2019-05-01 PROCEDURE — 88305 TISSUE EXAM BY PATHOLOGIST: CPT | Performed by: SURGERY

## 2019-05-01 PROCEDURE — 25010000002 GLUCAGON (RDNA) PER 1 MG: Performed by: SURGERY

## 2019-05-01 RX ORDER — LIDOCAINE HYDROCHLORIDE 10 MG/ML
0.5 INJECTION, SOLUTION INFILTRATION; PERINEURAL ONCE AS NEEDED
Status: DISCONTINUED | OUTPATIENT
Start: 2019-05-01 | End: 2019-05-01 | Stop reason: HOSPADM

## 2019-05-01 RX ORDER — SODIUM CHLORIDE 0.9 % (FLUSH) 0.9 %
3 SYRINGE (ML) INJECTION AS NEEDED
Status: DISCONTINUED | OUTPATIENT
Start: 2019-05-01 | End: 2019-05-01 | Stop reason: HOSPADM

## 2019-05-01 RX ORDER — LIDOCAINE HYDROCHLORIDE 20 MG/ML
INJECTION, SOLUTION INFILTRATION; PERINEURAL AS NEEDED
Status: DISCONTINUED | OUTPATIENT
Start: 2019-05-01 | End: 2019-05-01 | Stop reason: SURG

## 2019-05-01 RX ORDER — PROPOFOL 10 MG/ML
VIAL (ML) INTRAVENOUS AS NEEDED
Status: DISCONTINUED | OUTPATIENT
Start: 2019-05-01 | End: 2019-05-01 | Stop reason: SURG

## 2019-05-01 RX ORDER — SODIUM CHLORIDE, SODIUM LACTATE, POTASSIUM CHLORIDE, CALCIUM CHLORIDE 600; 310; 30; 20 MG/100ML; MG/100ML; MG/100ML; MG/100ML
1000 INJECTION, SOLUTION INTRAVENOUS CONTINUOUS
Status: DISCONTINUED | OUTPATIENT
Start: 2019-05-01 | End: 2019-05-01 | Stop reason: HOSPADM

## 2019-05-01 RX ADMIN — LIDOCAINE HYDROCHLORIDE 90 MG: 20 INJECTION, SOLUTION INFILTRATION; PERINEURAL at 12:51

## 2019-05-01 RX ADMIN — SODIUM CHLORIDE, POTASSIUM CHLORIDE, SODIUM LACTATE AND CALCIUM CHLORIDE 1000 ML: 600; 310; 30; 20 INJECTION, SOLUTION INTRAVENOUS at 12:46

## 2019-05-01 RX ADMIN — SODIUM CHLORIDE, POTASSIUM CHLORIDE, SODIUM LACTATE AND CALCIUM CHLORIDE: 600; 310; 30; 20 INJECTION, SOLUTION INTRAVENOUS at 12:51

## 2019-05-01 RX ADMIN — PROPOFOL 360 MG: 10 INJECTION, EMULSION INTRAVENOUS at 12:51

## 2019-05-01 NOTE — ANESTHESIA POSTPROCEDURE EVALUATION
"Patient: Nicanor TAN Arlt    Procedure Summary     Date:  05/01/19 Room / Location:   [facility]    Anesthesia Start:  1247 Anesthesia Stop:  1315    Procedure:  COLONOSCOPY to ILEOCOLIC ANASTAMOSIS WITH HOT SNARE/COLD BX POLYPECTOMY (N/A ) Diagnosis:       Generalized abdominal pain      Other constipation      Diarrhea, unspecified type      Hx of adenomatous colonic polyps      (Generalized abdominal pain [R10.84])      (Other constipation [K59.09])      (Diarrhea, unspecified type [R19.7])      (Hx of adenomatous colonic polyps [Z86.010])    Surgeon:  Elian Carnes MD Provider:  Lai Ring MD    Anesthesia Type:  MAC ASA Status:  3          Anesthesia Type: MAC  Last vitals  BP   120/78 (05/01/19 1325)   Temp   36.6 °C (97.8 °F) (05/01/19 1233)   Pulse   62 (05/01/19 1325)   Resp   16 (05/01/19 1325)     SpO2   93 % (05/01/19 1325)     Post Anesthesia Care and Evaluation    Patient location during evaluation: bedside  Patient participation: complete - patient participated  Level of consciousness: awake and alert  Pain management: adequate  Airway patency: patent  Anesthetic complications: No anesthetic complications    Cardiovascular status: acceptable  Respiratory status: acceptable  Hydration status: acceptable    Comments: /78   Pulse 62   Temp 36.6 °C (97.8 °F) (Oral)   Resp 16   Ht 172.7 cm (68\")   Wt (!) 144 kg (317 lb)   SpO2 93%   BMI 48.20 kg/m²       "

## 2019-05-01 NOTE — DISCHARGE INSTRUCTIONS
For the next 24 hours patient needs to be with a responsible adult.    For 24 hours DO NOT drive, operate machinery, appliances, drink alcohol, make important decisions or sign legal documents.    Start with a light or bland diet if you are feeling sick to your stomach otherwise advance to regular diet as tolerated.    Follow recommendations on procedure report if provided by your doctor.    Call Dr. Carnes for problems [phone]    Problems may include but not limited to: large amounts of bleeding, trouble breathing, repeated vomiting, severe unrelieved pain, fever or chills.

## 2019-05-01 NOTE — OP NOTE
PREOPERATIVE DIAGNOSIS:  High risk screening secondary to personal history of carcinoma in situ of ascending colon requiring right colectomy and personal history of tubulovillous and adenomatous polyps    POSTOPERATIVE DIAGNOSIS AND FINDINGS:  Small transverse colon polyp  Small rectal polyp    PROCEDURE:  Colonoscopy to ileocolic anastomosis with snare polypectomy of transverse colon polyp and cold biopsy removal of rectal polyp    SURGEON:  Elian Carnes MD    ANESTHESIA:  MAC    SPECIMEN(S):  Polyps    DESCRIPTION:  In decubitus position digital rectal exam was normal. Colonoscope inserted under direct visualization of lumen to ileocolic anastomosis.    Scope slowly withdrawn circumferentially examining all mucosal surfaces.    Quality of bowel preparation was fair but with vigorous flushing good visualization of mucosal surfaces was achieved.    There were 2 polyps.  The first was relatively small in the transverse colon removed completely with a hot snare and retrieved.  The second polyp was quite small and located in the rectum and removed completely with cold biopsy forceps.  Good hemostasis at both sites.    Tolerated well.    RECOMMENDATION FOR FUTURE SURVEILLANCE:  To be determined based on polyp pathology and issued as separate report    Elian Carnes M.D.

## 2019-05-01 NOTE — ANESTHESIA PREPROCEDURE EVALUATION
Anesthesia Evaluation     NPO Solid Status: > 8 hours  NPO Liquid Status: > 4 hours           Airway   Mallampati: II  TM distance: >3 FB  Neck ROM: full  no difficulty expected  Dental - normal exam     Pulmonary - normal exam   (+) sleep apnea,   Cardiovascular - normal exam    (+) hypertension, hyperlipidemia,       Neuro/Psych  (+) psychiatric history Depression,     GI/Hepatic/Renal/Endo    (+) obesity, morbid obesity,  hypothyroidism,     Musculoskeletal     Abdominal  - normal exam   Substance History      OB/GYN          Other   (+) arthritis   history of cancer                    Anesthesia Plan    ASA 3     MAC     Anesthetic plan, all risks, benefits, and alternatives have been provided, discussed and informed consent has been obtained with: patient.

## 2019-05-02 ENCOUNTER — DOCUMENTATION (OUTPATIENT)
Dept: SURGERY | Facility: CLINIC | Age: 69
End: 2019-05-02

## 2019-05-02 LAB
CYTO UR: NORMAL
LAB AP CASE REPORT: NORMAL
PATH REPORT.FINAL DX SPEC: NORMAL
PATH REPORT.GROSS SPEC: NORMAL

## 2019-05-02 NOTE — PROGRESS NOTES
ENDOSCOPY FOLLOW UP NOTE    Colonoscopy 5/1/2019    Indication:  High risk screening secondary to personal history of carcinoma in situ of ascending colon requiring right colectomy and personal history of tubulovillous and adenomatous polyps    Findings:  Small transverse colon polyp  Small rectal polyp    Pathology:  Transverse colon: Tubular adenoma  Rectum: Hyperplastic polyp    Recommendations:  5-year surveillance    Elian Carnes M.D.

## 2019-05-06 ENCOUNTER — TELEPHONE (OUTPATIENT)
Dept: SURGERY | Facility: CLINIC | Age: 69
End: 2019-05-06

## 2019-05-06 NOTE — TELEPHONE ENCOUNTER
----- Message from Elian Carnes MD sent at 5/2/2019 10:11 AM EDT -----  Please let him know that he had 2 benign polyps and needs 5-year surveillance-put in computer for reminder

## 2020-03-03 RX ORDER — COLESEVELAM 180 1/1
TABLET ORAL
Qty: 180 TABLET | Refills: 11 | OUTPATIENT
Start: 2020-03-03

## 2020-03-03 NOTE — TELEPHONE ENCOUNTER
Spoke w/ pharm tech-system automatically sent Rx refill request too early. Okay to deny due to 1 more refill remaining on Rx originally from 4/2019.

## 2020-03-16 RX ORDER — COLESEVELAM 180 1/1
TABLET ORAL
Qty: 180 TABLET | Refills: 11 | Status: ON HOLD | OUTPATIENT
Start: 2020-03-16 | End: 2023-03-24

## 2020-03-23 ENCOUNTER — TELEPHONE (OUTPATIENT)
Dept: FAMILY MEDICINE CLINIC | Facility: CLINIC | Age: 70
End: 2020-03-23

## 2020-03-23 NOTE — TELEPHONE ENCOUNTER
PT CALLED REQUESTING A REFILL FOR DILAUDID 4 MG    [facility] CONFIRMED     PT CALL BACK   [phone]

## 2021-03-22 ENCOUNTER — TELEPHONE (OUTPATIENT)
Dept: SURGERY | Facility: CLINIC | Age: 71
End: 2021-03-22

## 2021-03-22 NOTE — TELEPHONE ENCOUNTER
Patient called requesting appt w/ you due to continued LLQ pain. His last appointment was cancelled due to our office being closed for the weather. Since then his pain comes and goes, but now the pain is intensifying. He is conserned with this especially considering his history of colon ca in 2014. Offered an appointment with Jemal this week, but he declined requesting to see you. Scheduled him for your next available on 4/15, but he is asking if you might consider seeing him next week. Considered putting him on Jemal's schedule for Thurs 4/1 at the same as your clinic, but he is out that day. Patient is aware you are out until this Friday. Advised that if his pain continues to worsen he should proceed to the ED.

## 2021-03-30 ENCOUNTER — OFFICE VISIT (OUTPATIENT)
Dept: SURGERY | Facility: CLINIC | Age: 71
End: 2021-03-30

## 2021-03-30 VITALS — BODY MASS INDEX: 43.95 KG/M2 | HEIGHT: 68 IN | WEIGHT: 290 LBS

## 2021-03-30 DIAGNOSIS — R10.32 LLQ PAIN: Primary | ICD-10-CM

## 2021-03-30 DIAGNOSIS — R19.8 CHANGE IN BOWEL FUNCTION: ICD-10-CM

## 2021-03-30 DIAGNOSIS — Z85.038 HISTORY OF COLON CANCER: ICD-10-CM

## 2021-03-30 PROCEDURE — 99214 OFFICE O/P EST MOD 30 MIN: CPT | Performed by: PHYSICIAN ASSISTANT

## 2021-03-30 RX ORDER — POLYETHYLENE GLYCOL 3350 17 G/17G
17 POWDER ORAL DAILY PRN
Status: ON HOLD | COMMUNITY
Start: 2021-01-21 | End: 2023-03-24

## 2021-03-30 RX ORDER — DULOXETIN HYDROCHLORIDE 20 MG/1
20 CAPSULE, DELAYED RELEASE ORAL DAILY
COMMUNITY
Start: 2021-03-11 | End: 2023-03-29 | Stop reason: HOSPADM

## 2021-03-30 RX ORDER — LIDOCAINE 50 MG/G
1 PATCH TOPICAL 2 TIMES DAILY
Status: ON HOLD | COMMUNITY
Start: 2021-03-11 | End: 2023-03-24

## 2021-03-30 RX ORDER — CIPROFLOXACIN AND DEXAMETHASONE 3; 1 MG/ML; MG/ML
1 SUSPENSION/ DROPS AURICULAR (OTIC) DAILY
Status: ON HOLD | COMMUNITY
Start: 2021-01-21 | End: 2023-03-24

## 2021-03-30 RX ORDER — DICLOFENAC SODIUM 75 MG/1
75 TABLET, DELAYED RELEASE ORAL DAILY
COMMUNITY
Start: 2021-03-11 | End: 2023-03-29 | Stop reason: HOSPADM

## 2021-03-30 NOTE — PROGRESS NOTES
CC:    Left lower quadrant abdominal pain    HPI:    This is a 71-year-old gentleman returning to the office today after having last been seen by Dr. Carnes in 2019 for his routine colonoscopy.  He states that for the last 3 months he has been experiencing left lower quadrant abdominal pain as well as a difficulty with bowel movements until recently having started a stool softener, and abdominal distention after meals.  He states that the pain has only become more intense even after the resolution of his constipation.  He did have one episode of fever and chills as well as weakness that occurred approximately 1 week ago but has since resolved.  He denies having nausea or vomiting.  He has a history of diverticulitis that occurred approximately 6 years ago.    PMH:    History of colon cancer  Arthritis  Depression  Gout  Hearing loss  Hyperlipidemia  Hypertension  Hypothyroidism  Kidney stones  Polio  Rheumatoid arthritis  Sleep apnea  Obesity  Alternating constipation and diarrhea    PSH:    Colonoscopy 2019 with a history of polyps  ORIF tibia-fibula fracture left  Cholecystectomy with intraoperative cholangiogram 2017  Open right colectomy 2014 for colon cancer  Gastric banding  No surgery  Foot surgery  Total knee arthroplasty bilaterally    SH:  Does not smoke, does not consume alcohol    FMH:  No colorectal cancer in his family history    ALLERGIES:   Keflex    MEDICATIONS:  Reviewed and reconciled in Epic.    ROS:    Positive for sweating, dental problems, ear pain, hearing loss, cough, leg swelling, abdominal distention, abdominal pain, constipation, diarrhea, itching, pain, heat intolerance, excessive hunger, excessive thirst, excessive urination, urgent need to urinate, back pain, joint swelling, neck pain, neck stiffness, balance problems, numbness, weakness, bruises and bleeds easily, confusion, depression and sleep disturbance.  All other systems reviewed and negative other than presenting  "complaints.    PE:  Vitals: Weight 290 height 68\" BMI 44.1 Discussed with patient increased perioperative risks associated with obesity including increased risks of DVT, infection, seromas, poor wound healing and hernias (with abdominal surgery).  Constitutional: Well-nourished, well-developed male in no acute distress  HEENT: Normocephalic, atraumatic, sclera anicteric, normal conjunctiva  Pulmonary: Clear to auscultation bilaterally, normal respiratory effort, no wheezes, rales or rhonchi noted  Cardiac: Regular rate and rhythm, no murmurs, gallops or rubs noted  Abdomen: Tender to palpation in his left upper and lower quadrants all other quadrants are nontender to palpation, rotund abdomen, normal bowel sounds are present  Skin: Warm, dry, intact  Musculoskeletal: Patient is in a wheelchair  Psych: Alert and orient x3, normal affect, normal judgment    CLINICAL SUMMARY (A/P):    This is a 71-year-old gentleman presenting to the office today with approximately 3 months of worsening left lower quadrant abdominal pain.  He has a history of diverticulitis as well as a history of colon cancer status post open right hemicolectomy.  He is also had issues recently with bowel movements having had difficulty until he started a stool softener.  As such I am recommending proceeding with a CT scan of the abdomen pelvis to rule out diverticulitis.  If he does have diverticulitis we will proceed with oral antibiotic therapy.  If this is negative then a colonoscopy will likely be warranted due to his history.  I will contact him once I have read the results of the CT and we will proceed with the recommendations at that point.  All questions were answered and he was willing to proceed as described.      Vineet Dennis PA-C      "

## 2021-04-07 ENCOUNTER — HOSPITAL ENCOUNTER (OUTPATIENT)
Dept: CT IMAGING | Facility: HOSPITAL | Age: 71
Discharge: HOME | End: 2021-04-07
Admitting: PHYSICIAN ASSISTANT

## 2021-04-07 PROCEDURE — 74176 CT ABD & PELVIS W/O CONTRAST: CPT

## 2021-04-07 PROCEDURE — 0 DIATRIZOATE MEGLUMINE & SODIUM PER 1 ML: Performed by: PHYSICIAN ASSISTANT

## 2021-04-07 RX ADMIN — DIATRIZOATE MEGLUMINE AND DIATRIZOATE SODIUM 30 ML: 660; 100 LIQUID ORAL; RECTAL at 17:06

## 2021-04-08 ENCOUNTER — TELEPHONE (OUTPATIENT)
Dept: SURGERY | Facility: CLINIC | Age: 71
End: 2021-04-08

## 2021-04-08 DIAGNOSIS — K57.92 DIVERTICULITIS: Primary | ICD-10-CM

## 2021-04-08 RX ORDER — METRONIDAZOLE 500 MG/1
500 TABLET ORAL 3 TIMES DAILY
Qty: 30 TABLET | Refills: 0 | Status: SHIPPED | OUTPATIENT
Start: 2021-04-08 | End: 2021-04-14 | Stop reason: SINTOL

## 2021-04-08 RX ORDER — CIPROFLOXACIN 750 MG/1
750 TABLET, FILM COATED ORAL 2 TIMES DAILY
Qty: 20 TABLET | Refills: 0 | Status: ON HOLD | OUTPATIENT
Start: 2021-04-08 | End: 2023-03-24

## 2021-04-08 NOTE — TELEPHONE ENCOUNTER
I called and spoke with him over the phone after speaking with the radiologist in regards to his CT scan of the abdomen pelvis.  He does have a mild case of diverticulitis without perforation or abscess.  As such I am placing him on 10 days of Cipro and Flagyl.  I decided against Augmentin due to his allergy to Keflex.  He is to call me after he has completed his 10-day course let me know how he is doing.  I did advise him that we may proceed with a colonoscopy after he has resolved his diverticulitis.  He was understanding willing to proceed with all recommendations.    Vineet Dennis PA-C

## 2021-04-14 ENCOUNTER — TELEPHONE (OUTPATIENT)
Dept: SURGERY | Facility: CLINIC | Age: 71
End: 2021-04-14

## 2021-04-14 DIAGNOSIS — K57.92 DIVERTICULITIS: Primary | ICD-10-CM

## 2021-04-14 RX ORDER — AMOXICILLIN AND CLAVULANATE POTASSIUM 875; 125 MG/1; MG/1
1 TABLET, FILM COATED ORAL EVERY 12 HOURS
Qty: 20 TABLET | Refills: 0 | Status: SHIPPED | OUTPATIENT
Start: 2021-04-14 | End: 2021-04-24

## 2021-04-14 NOTE — TELEPHONE ENCOUNTER
I spoke to patient and he understood to stop Cipro and Flagyl and you called him in Augmentin. He is to  call us back if he has any additional issues.

## 2022-03-24 NOTE — PLAN OF CARE
Problem: Patient Care Overview  Goal: Plan of Care Review  Outcome: Ongoing (interventions implemented as appropriate)   10/18/18 2037   Coping/Psychosocial   Plan of Care Reviewed With patient   Plan of Care Review   Progress improving   OTHER   Outcome Summary patient up to chair with PT today, voiding without difficulty, BM today, PO dilaudid for pain, plan is for [facility] tomorrow, educated on the importance of BP monitoring related to history of HTN     Goal: Individualization and Mutuality  Outcome: Ongoing (interventions implemented as appropriate)   10/17/18 1958 10/18/18 0151   Individualization   Patient Specific Preferences none stated --    Patient Specific Goals (Include Timeframe) --  pain control, increased mobility   Patient Specific Interventions --  offer pain medication as ordered, ice, reposition, ambulate with assistance     Goal: Discharge Needs Assessment  Outcome: Ongoing (interventions implemented as appropriate)   10/18/18 0151   Discharge Needs Assessment   Readmission Within the Last 30 Days no previous admission in last 30 days   Concerns to be Addressed discharge planning   Patient/Family Anticipates Transition to inpatient rehabilitation facility   Patient/Family Anticipated Services at Transition ;skilled nursing   Transportation Concerns car, none   Transportation Anticipated family or friend will provide   Anticipated Changes Related to Illness inability to care for self   Equipment Needed After Discharge walker, rolling   Outpatient/Agency/Support Group Needs skilled nursing facility   Discharge Facility/Level of Care Needs nursing facility, skilled   Disability   Equipment Currently Used at Home walker, rolling       Problem: Fall Risk (Adult)  Goal: Absence of Fall  Outcome: Ongoing (interventions implemented as appropriate)   10/18/18 2037   Fall Risk (Adult)   Absence of Fall achieves outcome       Problem: Fracture Orthopaedic (Adult)  Goal: Signs and  Symptoms of Listed Potential Problems Will be Absent, Minimized or Managed (Fracture Orthopaedic)  Outcome: Ongoing (interventions implemented as appropriate)   10/18/18 0151 10/18/18 2037   Goal/Outcome Evaluation   Problems Assessed (Orthopaedic Fracture) --  all   Problems Present (Orthopaedic Fracture) functional deficit/self-care deficit;pain;situational response --        Problem: Skin Injury Risk (Adult)  Goal: Skin Health and Integrity  Outcome: Ongoing (interventions implemented as appropriate)   10/18/18 2037   Skin Injury Risk (Adult)   Skin Health and Integrity making progress toward outcome          - - -

## 2023-01-09 NOTE — H&P
HPI:  Personal history of polyps including tubulovillous polyps requiring surgical resection (right colectomy 2014)    PMH, PSH, MEDS AND ALLERGIES reviewed and reconciled with EPIC    PHYSICAL EXAM:  -  Constitutional:  no acute distress  -  Respiratory:  normal inspiratory effort  -  Cardiovascular: regular rate  -  Gastrointestinal: Soft    CLINICAL SUMMARY (A/P):    Surveillance colonoscopy    Elian Carnes M.D.   Patient transported to Radiology department via [facility] tech in stable condition.        Scott Neal RN  01/09/23 2443

## 2023-03-09 NOTE — PLAN OF CARE
-Increase gabapentin to 400 mg TID  -does have side effects of drowsiness but is not driving or working currently; will down-titrate once she is back to work  -Continue to wear supportive shoe from podiatry  -Avoid weight bearing on foot  -Continue to follow up with podiatry as needed   -Follow up in 3 weeks    Problem: Perioperative Period (Adult)  Goal: Signs and Symptoms of Listed Potential Problems Will be Absent or Manageable (Perioperative Period)  Outcome: Ongoing (interventions implemented as appropriate)    12/17/17 5736   Perioperative Period   Problems Assessed (Perioperative Period) all            Patent

## 2023-03-24 ENCOUNTER — HOSPITAL ENCOUNTER (OUTPATIENT)
Facility: HOSPITAL | Age: 73
LOS: 5 days | Discharge: NURSING, REHAB OR LONG-TERM CARE | End: 2023-03-29
Attending: EMERGENCY MEDICINE | Admitting: HOSPITALIST
Payer: MEDICARE

## 2023-03-24 ENCOUNTER — APPOINTMENT (OUTPATIENT)
Dept: GENERAL RADIOLOGY | Facility: HOSPITAL | Age: 73
End: 2023-03-24
Payer: MEDICARE

## 2023-03-24 DIAGNOSIS — R53.1 GENERALIZED WEAKNESS: Primary | ICD-10-CM

## 2023-03-24 DIAGNOSIS — R77.8 ELEVATED TROPONIN: ICD-10-CM

## 2023-03-24 DIAGNOSIS — R79.89 ELEVATED TSH: ICD-10-CM

## 2023-03-24 LAB
ALBUMIN SERPL-MCNC: 4 G/DL (ref 3.5–5.2)
ALBUMIN/GLOB SERPL: 1.1 G/DL
ALP SERPL-CCNC: 45 U/L (ref 39–117)
ALT SERPL W P-5'-P-CCNC: 28 U/L (ref 1–41)
ANION GAP SERPL CALCULATED.3IONS-SCNC: 10.1 MMOL/L (ref 5–15)
AST SERPL-CCNC: 29 U/L (ref 1–40)
BACTERIA UR QL AUTO: ABNORMAL /HPF
BASOPHILS # BLD AUTO: 0.03 10*3/MM3 (ref 0–0.2)
BASOPHILS NFR BLD AUTO: 0.6 % (ref 0–1.5)
BILIRUB SERPL-MCNC: 1 MG/DL (ref 0–1.2)
BILIRUB UR QL STRIP: NEGATIVE
BUN SERPL-MCNC: 18 MG/DL (ref 8–23)
BUN/CREAT SERPL: 15.1 (ref 7–25)
CALCIUM SPEC-SCNC: 10 MG/DL (ref 8.6–10.5)
CHLORIDE SERPL-SCNC: 102 MMOL/L (ref 98–107)
CLARITY UR: ABNORMAL
CO2 SERPL-SCNC: 31.9 MMOL/L (ref 22–29)
COLOR UR: YELLOW
CREAT SERPL-MCNC: 1.19 MG/DL (ref 0.76–1.27)
DEPRECATED RDW RBC AUTO: 42.9 FL (ref 37–54)
EGFRCR SERPLBLD CKD-EPI 2021: 64.5 ML/MIN/1.73
EOSINOPHIL # BLD AUTO: 0.04 10*3/MM3 (ref 0–0.4)
EOSINOPHIL NFR BLD AUTO: 0.8 % (ref 0.3–6.2)
ERYTHROCYTE [DISTWIDTH] IN BLOOD BY AUTOMATED COUNT: 13.1 % (ref 12.3–15.4)
GLOBULIN UR ELPH-MCNC: 3.7 GM/DL
GLUCOSE SERPL-MCNC: 112 MG/DL (ref 65–99)
GLUCOSE UR STRIP-MCNC: NEGATIVE MG/DL
HCT VFR BLD AUTO: 39.8 % (ref 37.5–51)
HGB BLD-MCNC: 13.3 G/DL (ref 13–17.7)
HGB UR QL STRIP.AUTO: ABNORMAL
HYALINE CASTS UR QL AUTO: ABNORMAL /LPF
KETONES UR QL STRIP: ABNORMAL
LEUKOCYTE ESTERASE UR QL STRIP.AUTO: NEGATIVE
LYMPHOCYTES # BLD AUTO: 0.99 10*3/MM3 (ref 0.7–3.1)
LYMPHOCYTES NFR BLD AUTO: 21 % (ref 19.6–45.3)
MCH RBC QN AUTO: 29.7 PG (ref 26.6–33)
MCHC RBC AUTO-ENTMCNC: 33.4 G/DL (ref 31.5–35.7)
MCV RBC AUTO: 88.8 FL (ref 79–97)
MONOCYTES # BLD AUTO: 0.49 10*3/MM3 (ref 0.1–0.9)
MONOCYTES NFR BLD AUTO: 10.4 % (ref 5–12)
NEUTROPHILS NFR BLD AUTO: 3.12 10*3/MM3 (ref 1.7–7)
NEUTROPHILS NFR BLD AUTO: 66.4 % (ref 42.7–76)
NITRITE UR QL STRIP: NEGATIVE
PH UR STRIP.AUTO: 5.5 [PH] (ref 5–8)
PLATELET # BLD AUTO: 131 10*3/MM3 (ref 140–450)
PMV BLD AUTO: 9.9 FL (ref 6–12)
POTASSIUM SERPL-SCNC: 3.6 MMOL/L (ref 3.5–5.2)
PROT SERPL-MCNC: 7.7 G/DL (ref 6–8.5)
PROT UR QL STRIP: ABNORMAL
QT INTERVAL: 550 MS
QT INTERVAL: 562 MS
RBC # BLD AUTO: 4.48 10*6/MM3 (ref 4.14–5.8)
RBC # UR STRIP: ABNORMAL /HPF
REF LAB TEST METHOD: ABNORMAL
SODIUM SERPL-SCNC: 144 MMOL/L (ref 136–145)
SP GR UR STRIP: 1.02 (ref 1–1.03)
SQUAMOUS #/AREA URNS HPF: ABNORMAL /HPF
T4 FREE SERPL-MCNC: 0.4 NG/DL (ref 0.93–1.7)
TROPONIN T SERPL HS-MCNC: 47 NG/L
TROPONIN T SERPL HS-MCNC: 69 NG/L
TSH SERPL DL<=0.05 MIU/L-ACNC: 65.4 UIU/ML (ref 0.27–4.2)
UROBILINOGEN UR QL STRIP: ABNORMAL
WBC # UR STRIP: ABNORMAL /HPF
WBC NRBC COR # BLD: 4.71 10*3/MM3 (ref 3.4–10.8)

## 2023-03-24 PROCEDURE — 81001 URINALYSIS AUTO W/SCOPE: CPT | Performed by: PHYSICIAN ASSISTANT

## 2023-03-24 PROCEDURE — 93005 ELECTROCARDIOGRAM TRACING: CPT | Performed by: PHYSICIAN ASSISTANT

## 2023-03-24 PROCEDURE — 80053 COMPREHEN METABOLIC PANEL: CPT | Performed by: PHYSICIAN ASSISTANT

## 2023-03-24 PROCEDURE — 84443 ASSAY THYROID STIM HORMONE: CPT | Performed by: PHYSICIAN ASSISTANT

## 2023-03-24 PROCEDURE — 93010 ELECTROCARDIOGRAM REPORT: CPT | Performed by: INTERNAL MEDICINE

## 2023-03-24 PROCEDURE — 85025 COMPLETE CBC W/AUTO DIFF WBC: CPT | Performed by: PHYSICIAN ASSISTANT

## 2023-03-24 PROCEDURE — 71045 X-RAY EXAM CHEST 1 VIEW: CPT

## 2023-03-24 PROCEDURE — 84439 ASSAY OF FREE THYROXINE: CPT | Performed by: PHYSICIAN ASSISTANT

## 2023-03-24 PROCEDURE — 84484 ASSAY OF TROPONIN QUANT: CPT | Performed by: PHYSICIAN ASSISTANT

## 2023-03-24 PROCEDURE — 36415 COLL VENOUS BLD VENIPUNCTURE: CPT

## 2023-03-24 PROCEDURE — 99285 EMERGENCY DEPT VISIT HI MDM: CPT

## 2023-03-24 RX ORDER — ASPIRIN 81 MG/1
81 TABLET, CHEWABLE ORAL DAILY
Status: DISCONTINUED | OUTPATIENT
Start: 2023-03-25 | End: 2023-03-29 | Stop reason: HOSPADM

## 2023-03-24 RX ORDER — LEVOTHYROXINE SODIUM 0.1 MG/1
100 TABLET ORAL
Status: DISCONTINUED | OUTPATIENT
Start: 2023-03-25 | End: 2023-03-25

## 2023-03-24 RX ORDER — PANTOPRAZOLE SODIUM 40 MG/1
40 TABLET, DELAYED RELEASE ORAL
Status: DISCONTINUED | OUTPATIENT
Start: 2023-03-25 | End: 2023-03-25

## 2023-03-24 RX ORDER — ACETAMINOPHEN 325 MG/1
650 TABLET ORAL EVERY 6 HOURS PRN
Status: DISCONTINUED | OUTPATIENT
Start: 2023-03-24 | End: 2023-03-29 | Stop reason: HOSPADM

## 2023-03-24 RX ORDER — SODIUM CHLORIDE 450 MG/100ML
75 INJECTION, SOLUTION INTRAVENOUS CONTINUOUS
Status: DISCONTINUED | OUTPATIENT
Start: 2023-03-25 | End: 2023-03-25

## 2023-03-24 NOTE — ED NOTES
".Nursing report ED to floor  Nicanor TAN Arlt  73 y.o.  male    HPI :   Chief Complaint   Patient presents with    Weakness - Generalized       Admitting doctor:   Gregg Solorzano MD    Admitting diagnosis:   The primary encounter diagnosis was Generalized weakness. Diagnoses of Elevated TSH and Elevated troponin were also pertinent to this visit.    Code status:   Current Code Status       Date Active Code Status Order ID Comments User Context       Prior            Allergies:   Keflex [cephalexin]    Isolation:   No active isolations    Intake and Output  No intake or output data in the 24 hours ending 03/24/23 1859    Weight:   There were no vitals filed for this visit.    Most recent vitals:   Vitals:    03/24/23 1441 03/24/23 1443 03/24/23 1658   BP: 143/76  144/77   BP Location: Right arm     Patient Position: Sitting     Pulse: 84  64   Resp: 16     Temp: 98.5 °F (36.9 °C)     TempSrc: Tympanic     SpO2: 97%  93%   Height:  177.8 cm (70\")        Active LDAs/IV Access:   Lines, Drains & Airways       Active LDAs       Name Placement date Placement time Site Days    Peripheral IV 03/24/23 1518 Right Antecubital 03/24/23  1518  Antecubital  less than 1                    Labs (abnormal labs have a star):   Labs Reviewed   COMPREHENSIVE METABOLIC PANEL - Abnormal; Notable for the following components:       Result Value    Glucose 112 (*)     CO2 31.9 (*)     All other components within normal limits    Narrative:     GFR Normal >60  Chronic Kidney Disease <60  Kidney Failure <15    The GFR formula is only valid for adults with stable renal function between ages 18 and 70.   URINALYSIS W/ MICROSCOPIC IF INDICATED (NO CULTURE) - Abnormal; Notable for the following components:    Appearance, UA Cloudy (*)     Ketones, UA Trace (*)     Blood, UA Large (3+) (*)     Protein, UA Trace (*)     All other components within normal limits   TSH - Abnormal; Notable for the following components:    TSH 65.400 (*)     All other " components within normal limits   T4, FREE - Abnormal; Notable for the following components:    Free T4 0.40 (*)     All other components within normal limits    Narrative:     Results may be falsely increased if patient taking Biotin.     CBC WITH AUTO DIFFERENTIAL - Abnormal; Notable for the following components:    Platelets 131 (*)     All other components within normal limits   SINGLE HSTROPONIN T - Abnormal; Notable for the following components:    HS Troponin T 47 (*)     All other components within normal limits    Narrative:     High Sensitive Troponin T Reference Range:  <10.0 ng/L- Negative Female for AMI  <15.0 ng/L- Negative Male for AMI  >=10 - Abnormal Female indicating possible myocardial injury.  >=15 - Abnormal Male indicating possible myocardial injury.   Clinicians would have to utilize clinical acumen, EKG, Troponin, and serial changes to determine if it is an Acute Myocardial Infarction or myocardial injury due to an underlying chronic condition.        URINALYSIS, MICROSCOPIC ONLY - Abnormal; Notable for the following components:    RBC, UA 13-20 (*)     All other components within normal limits   SINGLE HSTROPONIN T - Abnormal; Notable for the following components:    HS Troponin T 69 (*)     All other components within normal limits    Narrative:     High Sensitive Troponin T Reference Range:  <10.0 ng/L- Negative Female for AMI  <15.0 ng/L- Negative Male for AMI  >=10 - Abnormal Female indicating possible myocardial injury.  >=15 - Abnormal Male indicating possible myocardial injury.   Clinicians would have to utilize clinical acumen, EKG, Troponin, and serial changes to determine if it is an Acute Myocardial Infarction or myocardial injury due to an underlying chronic condition.        CBC AND DIFFERENTIAL    Narrative:     The following orders were created for panel order CBC & Differential.  Procedure                               Abnormality         Status                     ---------                                -----------         ------                     CBC Auto Differential[[id number]]        Abnormal            Final result                 Please view results for these tests on the individual orders.       EKG:   ECG 12 Lead Other; generalized weakness   Preliminary Result   HEART RATE= 59  bpm   RR Interval= 1017  ms   WA Interval= 211  ms   P Horizontal Axis= 17  deg   P Front Axis= 40  deg   QRSD Interval= 92  ms   QT Interval= 550  ms   QRS Axis= 58  deg   T Wave Axis= 41  deg   - ABNORMAL ECG -   Sinus rhythm   Low voltage, precordial leads   Nonspecific T abnrm, anterolateral leads   Prolonged QT interval   Electronically Signed By:    Date and Time of Study: 2023-03-24 18:46:13      ECG 12 Lead Other; generalized weakness   Final Result   HEART RATE= 60  bpm   RR Interval= 1000  ms   WA Interval= 211  ms   P Horizontal Axis= 23  deg   P Front Axis= 32  deg   QRSD Interval= 70  ms   QT Interval= 562  ms   QRS Axis= 33  deg   T Wave Axis= 87  deg   - ABNORMAL ECG -   Sinus rhythm   Low voltage, precordial leads   Nonspecific T abnrm, anterolateral leads - new   Prolonged QT interval   Electronically Signed By: Alanna Yusuf ([facility]) 24-Mar-2023 16:31:24   Date and Time of Study: 2023-03-24 15:46:08          Meds given in ED:   Medications - No data to display    Imaging results:  XR Chest 1 View    Result Date: 3/24/2023  No focal pulmonary consolidation. Borderline heart size. Follow-up as clinical indications persist.  This report was finalized on 3/24/2023 3:25 PM by Dr. Eric Castellano M.D.       Ambulatory status:   -Max Assist    Social issues:   Social History     Socioeconomic History    Marital status: Single   Tobacco Use    Smoking status: Never    Smokeless tobacco: Never   Vaping Use    Vaping Use: Never used   Substance and Sexual Activity    Alcohol use: No    Drug use: No    Sexual activity: Freddy So RN  03/24/23 18:59 EDT

## 2023-03-24 NOTE — ED TRIAGE NOTES
Pt presents to ED via [facility] from home with complaints of weakness, decreased PO and inability take his medications because he is bed bound and unable to care for himself.

## 2023-03-24 NOTE — ED PROVIDER NOTES
MD ATTESTATION NOTE    The PRINCESS and I have discussed this patient's history, physical exam, and treatment plan.  I have reviewed the documentation and personally had a face to face interaction with the patient. I affirm the documentation and agree with the treatment and plan.  The attached note describes my personal findings.      I provided a substantive portion of the care of the patient.  I personally performed the physical exam in its entirety, and below are my findings.  For this patient encounter, the patient wore surgical mask, I wore full protective PPE including N95 and eye protection.      Brief HPI: Patient complains of generalized weakness and decreased p.o. intake.  Patient lives alone and is bedbound.  A friend has been helping take care of him but reportedly can no longer assist the patient.  Patient does not have any family that can help him.  Denies cough, fever, chest pain, shortness of breath, vomiting, or diarrhea.    PHYSICAL EXAM  ED Triage Vitals [03/24/23 1441]   Temp Heart Rate Resp BP SpO2   98.5 °F (36.9 °C) 84 16 143/76 97 %      Temp src Heart Rate Source Patient Position BP Location FiO2 (%)   Tympanic -- Sitting Right arm --         GENERAL: Awake, alert, oriented x3.  Well-developed obese male.  No acute distress  HENT: nares patent, moist mucous membranes  EYES: no scleral icterus  CV: regular rhythm, normal rate  RESPIRATORY: normal effort, clear to auscultation bilateral  ABDOMEN: soft, obese, nontender  MUSCULOSKELETAL: no deformity  NEURO: alert, moves all extremities, follows commands  PSYCH:  calm, cooperative  SKIN: warm, dry    Vital signs and nursing notes reviewed.        Plan: Obtain labs, EKG, and chest x-ray.    ED Course as of 03/24/23 1903   Fri Mar 24, 2023   1625 Chest x-ray personally interpreted by me.  My personal interpretation is: Heart size is borderline.  Lungs are clear.  No pneumothorax [WH]   1625 EKG personally interpreted by me.  My personal interpretation  is:         EKG time: 1546  Rhythm/Rate: Sinus rhythm, rate 60  P waves and IN: First-degree AV block  QRS, axis: Normal axis, low voltage  ST and T waves:, Nonspecific ST/T wave changes in multiple leads    Interpreted Contemporaneously by me at 1551, independently viewed  EKG is not significantly changed compared to prior EKG done on 11/6/2017   []   1626 HS Troponin T(!): 47 []   1626 TSH Baseline(!): 65.400 [WH]   1626 Free T4(!): 0.40 [WH]   1626 BUN: 18 [WH]   1626 Creatinine: 1.19 [WH]   1836 HS Troponin T(!!): 69  Delta is +22 []   1844 I discussed the patient's care with Dr. Solorzano with [facility].  He is agreeable to admission. [AR]   1902 Repeat EKG.  EKG personally interpreted by me.  My personal interpretation is:          EKG time: 1846  Rhythm/Rate: Sinus bradycardia, rate 59  P waves and IN: First-degree AV block  QRS, axis: Normal axis, low voltage  ST and T waves: Nonspecific ST/T wave changes     Interpreted Contemporaneously by me at 1849, independently viewed  EKG is not significantly changed compared to prior EKG done today at 1546   []      ED Course User Index  [AR] Liz Logan PA  [WH] Jaun Bradley MD Holland, William D, MD  03/24/23 1903

## 2023-03-24 NOTE — ED PROVIDER NOTES
EMERGENCY DEPARTMENT ENCOUNTER    Room Number:  01/01  Date seen:  3/24/2023  PCP: Gertrude Atkinson MD    Spoken Language:  English  Language interpretation services not needed     HPI:  Chief Complaint: generalized weakness    A complete HPI/ROS/PMH/PSH/SH/FH are unobtainable due to: n/a    Context: Nicanor Haley is a 73 y.o. male presenting to the emergency department complaining of generalized weakness and difficulty taking care of himself at home.  The history is being obtained by the patient and by review of the medical chart.  The patient states that he has had generalized weakness as well as decreased p.o. intake.  He states that he has not been able to physically get in and out of bed independently for the past year.  He states that his friends have been taking care of him, but that the friend that was most recently taking care of him quit yesterday.  He states that he now has nobody to take care of him at home, and is unable to take care of himself.  He states that none of his family members live locally.  He denies fever, sore throat, cough, chest pain, shortness of breath, abdominal pain, nausea, vomiting or diarrhea.  The patient states that he has been compliant with all home medications.    PAST MEDICAL HISTORY  Active Ambulatory Problems     Diagnosis Date Noted   • History of colon polyps 03/15/2017   • Acute pancreatitis due to calculus of common bile duct 11/07/2017   • Abdominal pain, generalized 11/07/2017   • Closed left tibial fracture 12/18/2017   • Closed bimalleolar fracture of left ankle 12/18/2017   • Left tibial fracture 12/20/2017   • Closed fracture of upper end of left fibula 10/16/2018   • Immobility syndrome 10/16/2018   • HTN (hypertension) 10/17/2018   • Generalized abdominal pain 04/15/2019   • Other constipation 04/15/2019   • Diarrhea 04/15/2019   • Hx of adenomatous colonic polyps 04/15/2019     Resolved Ambulatory Problems     Diagnosis Date Noted   • Fall 12/17/2017      Past Medical History:   Diagnosis Date   • Arthritis    • Colon cancer (CMS/HCC) 2014   • Colon polyps 08/06/2014   • Depression    • Gout    • Hearing loss    • Hyperlipemia    • Hypertension    • Hypothyroidism    • Kidney stones    • Polio    • Rheumatoid arthritis (CMS/HCC)    • Sleep apnea        PAST SURGICAL HISTORY  Past Surgical History:   Procedure Laterality Date   • CHOLECYSTECTOMY WITH INTRAOPERATIVE CHOLANGIOGRAM N/A 11/10/2017    Procedure: CHOLECYSTECTOMY LAPAROSCOPIC INTRAOPERATIVE CHOLANGIOGRAM;  Surgeon: Elian Carnes MD;  Location: [facility] MAIN OR;  Service:    • COLECTOMY PARTIAL / TOTAL Right 09/08/2014    Open right colectomy-Dr. Elian Carnes   • COLONOSCOPY N/A 3/15/2017    Procedure: COLONOSCOPY TO ANASTAMOSIS AND TI WITH COLD SNARE POLYPECTOMY ;  Surgeon: Elian Carnes MD;  Location: [facility] ENDOSCOPY;  Service:    • COLONOSCOPY N/A 08/06/2014    1 cm cecal polyp, 1 cm transverse polyp, 1 cm sigmoid colon polyp, mass in the ascending colon occupying 1/3 of the colonic circumference, mass proximal to the transverse colon occupying 1/4 colonic circumference-Dr. Elian Carnes   • COLONOSCOPY N/A 5/1/2019    Procedure: COLONOSCOPY to ILEOCOLIC ANASTAMOSIS WITH HOT SNARE/COLD BX POLYPECTOMY;  Surgeon: Elian Carnes MD;  Location: [facility] ENDOSCOPY;  Service: General   • FOOT SURGERY Right 2008, 2009   • GASTRIC BANDING N/A 12/28/2009   • NOSE SURGERY N/A 1989   • ORIF TIBIA/FIBULA FRACTURES Left 12/17/2017    Procedure: TIBIA/FIBULA OPEN REDUCTION INTERNAL FIXATION;  Surgeon: Lai Acevedo Jr., MD;  Location: [facility] MAIN OR;  Service:    • ORIF TIBIA/FIBULA FRACTURES Right    • TOTAL KNEE ARTHROPLASTY Bilateral 1/2002, 6/2002 1/2002-left knee 6/2002-right knee-Dr. Jaun Sosa [facility] [place]       FAMILY HISTORY  Family History   Problem Relation Age of Onset   • Lung cancer Mother    • Hypertension Father    • Diabetes Sister    • Hypertension Brother    • Hypertension Brother     • Lung cancer Brother        SOCIAL HISTORY  Social History     Socioeconomic History   • Marital status: Single   Tobacco Use   • Smoking status: Never   • Smokeless tobacco: Never   Vaping Use   • Vaping Use: Never used   Substance and Sexual Activity   • Alcohol use: No   • Drug use: No   • Sexual activity: Defer       ALLERGIES  Keflex [cephalexin]    REVIEW OF SYSTEMS  All systems reviewed and negative except for those discussed in HPI.     PHYSICAL EXAM  ED Triage Vitals [03/24/23 1441]   Temp Heart Rate Resp BP SpO2   98.5 °F (36.9 °C) 84 16 143/76 97 %      Temp src Heart Rate Source Patient Position BP Location FiO2 (%)   Tympanic -- Sitting Right arm --       Physical Exam  Vitals and nursing note reviewed.   Constitutional:       Appearance: Normal appearance. He is obese.   HENT:      Head: Normocephalic and atraumatic.      Mouth/Throat:      Mouth: Mucous membranes are moist.   Eyes:      Extraocular Movements: Extraocular movements intact.      Pupils: Pupils are equal, round, and reactive to light.   Cardiovascular:      Rate and Rhythm: Normal rate and regular rhythm.   Pulmonary:      Effort: Pulmonary effort is normal.      Breath sounds: Normal breath sounds.   Abdominal:      General: There is no distension.      Palpations: Abdomen is soft.      Tenderness: There is no abdominal tenderness.   Musculoskeletal:      Cervical back: Normal range of motion and neck supple.   Neurological:      Mental Status: He is alert and oriented to person, place, and time. Mental status is at baseline.   Psychiatric:         Mood and Affect: Mood normal.         Behavior: Behavior normal.         Thought Content: Thought content normal.         Judgment: Judgment normal.       LAB RESULTS  Recent Results (from the past 24 hour(s))   Comprehensive Metabolic Panel    Collection Time: 03/24/23  3:15 PM    Specimen: Blood   Result Value Ref Range    Glucose 112 (H) 65 - 99 mg/dL    BUN 18 8 - 23 mg/dL     Creatinine 1.19 0.76 - 1.27 mg/dL    Sodium 144 136 - 145 mmol/L    Potassium 3.6 3.5 - 5.2 mmol/L    Chloride 102 98 - 107 mmol/L    CO2 31.9 (H) 22.0 - 29.0 mmol/L    Calcium 10.0 8.6 - 10.5 mg/dL    Total Protein 7.7 6.0 - 8.5 g/dL    Albumin 4.0 3.5 - 5.2 g/dL    ALT (SGPT) 28 1 - 41 U/L    AST (SGOT) 29 1 - 40 U/L    Alkaline Phosphatase 45 39 - 117 U/L    Total Bilirubin 1.0 0.0 - 1.2 mg/dL    Globulin 3.7 gm/dL    A/G Ratio 1.1 g/dL    BUN/Creatinine Ratio 15.1 7.0 - 25.0    Anion Gap 10.1 5.0 - 15.0 mmol/L    eGFR 64.5 >60.0 mL/min/1.73   TSH    Collection Time: 03/24/23  3:15 PM    Specimen: Blood   Result Value Ref Range    TSH 65.400 (H) 0.270 - 4.200 uIU/mL   T4, Free    Collection Time: 03/24/23  3:15 PM    Specimen: Blood   Result Value Ref Range    Free T4 0.40 (L) 0.93 - 1.70 ng/dL   CBC Auto Differential    Collection Time: 03/24/23  3:15 PM    Specimen: Blood   Result Value Ref Range    WBC 4.71 3.40 - 10.80 10*3/mm3    RBC 4.48 4.14 - 5.80 10*6/mm3    Hemoglobin 13.3 13.0 - 17.7 g/dL    Hematocrit 39.8 37.5 - 51.0 %    MCV 88.8 79.0 - 97.0 fL    MCH 29.7 26.6 - 33.0 pg    MCHC 33.4 31.5 - 35.7 g/dL    RDW 13.1 12.3 - 15.4 %    RDW-SD 42.9 37.0 - 54.0 fl    MPV 9.9 6.0 - 12.0 fL    Platelets 131 (L) 140 - 450 10*3/mm3    Neutrophil % 66.4 42.7 - 76.0 %    Lymphocyte % 21.0 19.6 - 45.3 %    Monocyte % 10.4 5.0 - 12.0 %    Eosinophil % 0.8 0.3 - 6.2 %    Basophil % 0.6 0.0 - 1.5 %    Neutrophils, Absolute 3.12 1.70 - 7.00 10*3/mm3    Lymphocytes, Absolute 0.99 0.70 - 3.10 10*3/mm3    Monocytes, Absolute 0.49 0.10 - 0.90 10*3/mm3    Eosinophils, Absolute 0.04 0.00 - 0.40 10*3/mm3    Basophils, Absolute 0.03 0.00 - 0.20 10*3/mm3   Single High Sensitivity Troponin T    Collection Time: 03/24/23  3:15 PM    Specimen: Blood   Result Value Ref Range    HS Troponin T 47 (H) <15 ng/L   Urinalysis With Microscopic If Indicated (No Culture) - Urine, Clean Catch    Collection Time: 03/24/23  3:39 PM     Specimen: Urine, Clean Catch   Result Value Ref Range    Color, UA Yellow Yellow, Straw    Appearance, UA Cloudy (A) Clear    pH, UA 5.5 5.0 - 8.0    Specific Gravity, UA 1.022 1.005 - 1.030    Glucose, UA Negative Negative    Ketones, UA Trace (A) Negative    Bilirubin, UA Negative Negative    Blood, UA Large (3+) (A) Negative    Protein, UA Trace (A) Negative    Leuk Esterase, UA Negative Negative    Nitrite, UA Negative Negative    Urobilinogen, UA 1.0 E.U./dL 0.2 - 1.0 E.U./dL   Urinalysis, Microscopic Only - Urine, Clean Catch    Collection Time: 03/24/23  3:39 PM    Specimen: Urine, Clean Catch   Result Value Ref Range    RBC, UA 13-20 (A) None Seen, 0-2 /HPF    WBC, UA 0-2 None Seen, 0-2 /HPF    Bacteria, UA None Seen None Seen /HPF    Squamous Epithelial Cells, UA 0-2 None Seen, 0-2 /HPF    Hyaline Casts, UA 0-2 None Seen /LPF    Methodology Automated Microscopy    ECG 12 Lead Other; generalized weakness    Collection Time: 03/24/23  3:46 PM   Result Value Ref Range    QT Interval 562 ms   Single High Sensitivity Troponin T    Collection Time: 03/24/23  5:29 PM    Specimen: Blood   Result Value Ref Range    HS Troponin T 69 (C) <15 ng/L   ECG 12 Lead Other; generalized weakness    Collection Time: 03/24/23  6:46 PM   Result Value Ref Range    QT Interval 550 ms     I ordered the above labs and reviewed the results.    RADIOLOGY  XR Chest 1 View    Result Date: 3/24/2023  XR CHEST 1 VW-  HISTORY: Male who is 73 years-old,  weakness  TECHNIQUE: Frontal view of the chest  COMPARISON: 12/17/2017  FINDINGS: The heart size is borderline. Aorta is calcified. Pulmonary vasculature is unremarkable. No focal pulmonary consolidation, pleural effusion, or pneumothorax. No acute osseous process.      No focal pulmonary consolidation. Borderline heart size. Follow-up as clinical indications persist.  This report was finalized on 3/24/2023 3:25 PM by Dr. Eric Castellano M.D.      I ordered the above noted radiological  studies. Reviewed by me in PACS.      PROCEDURES  Procedures  None    MEDICATIONS GIVEN IN ER  Medications - No data to display    MEDICAL DECISION MAKING, PROGRESS, and CONSULTS  Vital signs and nursing notes have all been reviewed by me.    All laboratory results have been independently interpreted by me.      Orders placed during this visit:  Orders Placed This Encounter   Procedures   • XR Chest 1 View   • Comprehensive Metabolic Panel   • Urinalysis With Microscopic If Indicated (No Culture) - Urine, Clean Catch   • TSH   • T4, Free   • CBC Auto Differential   • Single High Sensitivity Troponin T   • Urinalysis, Microscopic Only - Urine, Clean Catch   • Single High Sensitivity Troponin T   • Monitor Blood Pressure   • Cardiac Monitoring   • Pulse Oximetry, Continuous   • [facility] (on-call MD unless specified)   • ECG 12 Lead Other; generalized weakness   • ECG 12 Lead Other; generalized weakness   • Inpatient Admission   • CBC & Differential       Differential diagnosis includes but is not limited to:  -LAYTON  -hypothyroidism  -cardiac abnormality  -viral or bacterial infection    Independent interpretation of labs, radiology studies, and discussions with consultants: Dr. Solorzano with [facility]    Discussion below represents my analysis of pertinent findings related to patient's condition, differential diagnosis, treatment plan and final disposition:    The patient presents with generalized weakness and decreased p.o. intake.  He lives at home by himself, is bedbound, and no longer has anyone to care for him.  I do not believe that it is reasonable to discharge him home, even if he had a normal ED work-up.  The patient's TSH is significantly elevated.  In addition, his troponin is elevated.  He denies chest pain.  He will need to be admitted to the hospital for further evaluation and management, and also potentially for placement.    ED Course as of 03/24/23 1928   Fri Mar 24, 2023   1625 Chest x-ray personally  interpreted by me.  My personal interpretation is: Heart size is borderline.  Lungs are clear.  No pneumothorax [WH]   1625 EKG personally interpreted by me.  My personal interpretation is:         EKG time: 1546  Rhythm/Rate: Sinus rhythm, rate 60  P waves and NH: First-degree AV block  QRS, axis: Normal axis, low voltage  ST and T waves:, Nonspecific ST/T wave changes in multiple leads    Interpreted Contemporaneously by me at 1551, independently viewed  EKG is not significantly changed compared to prior EKG done on 11/6/2017   [WH]   1626 HS Troponin T(!): 47 [WH]   1626 TSH Baseline(!): 65.400 [WH]   1626 Free T4(!): 0.40 [WH]   1626 BUN: 18 [WH]   1626 Creatinine: 1.19 [WH]   1836 HS Troponin T(!!): 69  Delta is +22 [WH]   1844 I discussed the patient's care with Dr. Solorzano with [facility].  He is agreeable to admission. [AR]   1902 Repeat EKG.  EKG personally interpreted by me.  My personal interpretation is:          EKG time: 1846  Rhythm/Rate: Sinus bradycardia, rate 59  P waves and NH: First-degree AV block  QRS, axis: Normal axis, low voltage  ST and T waves: Nonspecific ST/T wave changes     Interpreted Contemporaneously by me at 1849, independently viewed  EKG is not significantly changed compared to prior EKG done today at 1546   [WH]      ED Course User Index  [AR] Liz Logan PA  [WH] Jaun Bradley MD            Additional sources:  - Chronic or social conditions impacting care: Patient is bedbound and lives independently and states that he has no one to help take care of him    - Shared decision making: I discussed ED evaluation and treatment plan with patient who is in agreement.  Discussed at length ED testing.     PPE: The patient was placed in a face mask in first look. Patient was wearing facemask when I entered the room and throughout our encounter. I wore full protective equipment throughout this patient encounter including a face mask, eye protection and gloves. Hand hygiene was  performed before donning protective equipment and after removal when leaving the room.    DIAGNOSIS  Final diagnoses:   Generalized weakness   Elevated TSH   Elevated troponin       DISPOSITION  ED Disposition     ED Disposition   Decision to Admit    Condition   --    Comment   Level of Care: Telemetry [5]   Diagnosis: Generalized weakness [494254]   Admitting Physician: CHRISTINA VAUGHN [[id number]]   Attending Physician: CHRISTINA VAUGHN [[id number]]   Certification: I Certify That Inpatient Hospital Services Are Medically Necessary For Greater Than 2 Midnights             RX  Medications - No data to display       Medication List      No changes were made to your prescriptions during this visit.         Latest Documented Vital Signs:  As of 19:28 EDT  BP- 145/69 HR- 64 Temp- 98.5 °F (36.9 °C) (Tympanic) O2 sat- 93%    Provider Attestation:  I personally reviewed the past medical history, past surgical history, social history, family history, current medications and allergies as they appear in the chart. I reviewed the patient's history, physical, lab/imaging results and overall care with Dr. Bradley who is in agreement with the patient's treatment plan.    EMR Dragon/Transcription disclaimer:  Dictated using Dragon dictation    Provider note signed by:    Note Disclaimer: At [facility], we believe that sharing information builds trust and better relationships. You are receiving this note because you are receiving care at [facility] or recently visited. It is possible you will see health information before a provider has talked with you about it. This kind of information can be easy to misunderstand. To help you fully understand what it means for your health, we urge you to discuss this note with your provider.       Liz Logan PA  03/24/23 1930

## 2023-03-25 ENCOUNTER — APPOINTMENT (OUTPATIENT)
Dept: NUCLEAR MEDICINE | Facility: HOSPITAL | Age: 73
End: 2023-03-25
Payer: MEDICARE

## 2023-03-25 LAB
ALBUMIN SERPL-MCNC: 3.9 G/DL (ref 3.5–5.2)
ALBUMIN/GLOB SERPL: 1.2 G/DL
ALP SERPL-CCNC: 43 U/L (ref 39–117)
ALT SERPL W P-5'-P-CCNC: 25 U/L (ref 1–41)
ANION GAP SERPL CALCULATED.3IONS-SCNC: 10.7 MMOL/L (ref 5–15)
AST SERPL-CCNC: 36 U/L (ref 1–40)
BASOPHILS # BLD AUTO: 0.02 10*3/MM3 (ref 0–0.2)
BASOPHILS NFR BLD AUTO: 0.4 % (ref 0–1.5)
BILIRUB SERPL-MCNC: 1 MG/DL (ref 0–1.2)
BUN SERPL-MCNC: 18 MG/DL (ref 8–23)
BUN/CREAT SERPL: 17.8 (ref 7–25)
CALCIUM SPEC-SCNC: 9.6 MG/DL (ref 8.6–10.5)
CHLORIDE SERPL-SCNC: 101 MMOL/L (ref 98–107)
CO2 SERPL-SCNC: 28.3 MMOL/L (ref 22–29)
CREAT SERPL-MCNC: 1.01 MG/DL (ref 0.76–1.27)
DEPRECATED RDW RBC AUTO: 44.1 FL (ref 37–54)
EGFRCR SERPLBLD CKD-EPI 2021: 78.5 ML/MIN/1.73
EOSINOPHIL # BLD AUTO: 0.04 10*3/MM3 (ref 0–0.4)
EOSINOPHIL NFR BLD AUTO: 0.7 % (ref 0.3–6.2)
ERYTHROCYTE [DISTWIDTH] IN BLOOD BY AUTOMATED COUNT: 13.6 % (ref 12.3–15.4)
GEN 5 2HR TROPONIN T REFLEX: 56 NG/L
GLOBULIN UR ELPH-MCNC: 3.2 GM/DL
GLUCOSE SERPL-MCNC: 99 MG/DL (ref 65–99)
HCT VFR BLD AUTO: 38.6 % (ref 37.5–51)
HGB BLD-MCNC: 12.5 G/DL (ref 13–17.7)
LYMPHOCYTES # BLD AUTO: 1.37 10*3/MM3 (ref 0.7–3.1)
LYMPHOCYTES NFR BLD AUTO: 24.9 % (ref 19.6–45.3)
MAGNESIUM SERPL-MCNC: 2.1 MG/DL (ref 1.6–2.4)
MCH RBC QN AUTO: 29.1 PG (ref 26.6–33)
MCHC RBC AUTO-ENTMCNC: 32.4 G/DL (ref 31.5–35.7)
MCV RBC AUTO: 89.8 FL (ref 79–97)
MONOCYTES # BLD AUTO: 0.67 10*3/MM3 (ref 0.1–0.9)
MONOCYTES NFR BLD AUTO: 12.2 % (ref 5–12)
NEUTROPHILS NFR BLD AUTO: 3.39 10*3/MM3 (ref 1.7–7)
NEUTROPHILS NFR BLD AUTO: 61.4 % (ref 42.7–76)
NT-PROBNP SERPL-MCNC: 200 PG/ML (ref 0–900)
PLATELET # BLD AUTO: 132 10*3/MM3 (ref 140–450)
PMV BLD AUTO: 10.7 FL (ref 6–12)
POTASSIUM SERPL-SCNC: 3.1 MMOL/L (ref 3.5–5.2)
PROT SERPL-MCNC: 7.1 G/DL (ref 6–8.5)
QT INTERVAL: 484 MS
RBC # BLD AUTO: 4.3 10*6/MM3 (ref 4.14–5.8)
SODIUM SERPL-SCNC: 140 MMOL/L (ref 136–145)
T4 FREE SERPL-MCNC: 0.47 NG/DL (ref 0.93–1.7)
TROPONIN T DELTA: -1 NG/L
TROPONIN T SERPL HS-MCNC: 57 NG/L
WBC NRBC COR # BLD: 5.51 10*3/MM3 (ref 3.4–10.8)

## 2023-03-25 PROCEDURE — 97110 THERAPEUTIC EXERCISES: CPT

## 2023-03-25 PROCEDURE — 96374 THER/PROPH/DIAG INJ IV PUSH: CPT

## 2023-03-25 PROCEDURE — 84484 ASSAY OF TROPONIN QUANT: CPT | Performed by: HOSPITALIST

## 2023-03-25 PROCEDURE — 83735 ASSAY OF MAGNESIUM: CPT | Performed by: HOSPITALIST

## 2023-03-25 PROCEDURE — 96361 HYDRATE IV INFUSION ADD-ON: CPT

## 2023-03-25 PROCEDURE — 93005 ELECTROCARDIOGRAM TRACING: CPT | Performed by: HOSPITALIST

## 2023-03-25 PROCEDURE — 25010000002 SODIUM CHLORIDE 0.9 % WITH KCL 20 MEQ 20-0.9 MEQ/L-% SOLUTION: Performed by: HOSPITALIST

## 2023-03-25 PROCEDURE — 85025 COMPLETE CBC W/AUTO DIFF WBC: CPT | Performed by: HOSPITALIST

## 2023-03-25 PROCEDURE — 25010000002 MORPHINE PER 10 MG: Performed by: INTERNAL MEDICINE

## 2023-03-25 PROCEDURE — 99221 1ST HOSP IP/OBS SF/LOW 40: CPT | Performed by: INTERNAL MEDICINE

## 2023-03-25 PROCEDURE — 97162 PT EVAL MOD COMPLEX 30 MIN: CPT

## 2023-03-25 PROCEDURE — 80053 COMPREHEN METABOLIC PANEL: CPT | Performed by: HOSPITALIST

## 2023-03-25 PROCEDURE — 96376 TX/PRO/DX INJ SAME DRUG ADON: CPT

## 2023-03-25 PROCEDURE — 93010 ELECTROCARDIOGRAM REPORT: CPT | Performed by: INTERNAL MEDICINE

## 2023-03-25 PROCEDURE — 83880 ASSAY OF NATRIURETIC PEPTIDE: CPT | Performed by: HOSPITALIST

## 2023-03-25 PROCEDURE — 84439 ASSAY OF FREE THYROXINE: CPT | Performed by: HOSPITALIST

## 2023-03-25 RX ORDER — NITROGLYCERIN 0.4 MG/1
0.4 TABLET SUBLINGUAL
Status: DISCONTINUED | OUTPATIENT
Start: 2023-03-25 | End: 2023-03-27

## 2023-03-25 RX ORDER — ATORVASTATIN CALCIUM 20 MG/1
20 TABLET, FILM COATED ORAL DAILY
Status: DISCONTINUED | OUTPATIENT
Start: 2023-03-25 | End: 2023-03-25

## 2023-03-25 RX ORDER — IPRATROPIUM BROMIDE AND ALBUTEROL SULFATE 2.5; .5 MG/3ML; MG/3ML
3 SOLUTION RESPIRATORY (INHALATION) EVERY 4 HOURS PRN
Status: DISCONTINUED | OUTPATIENT
Start: 2023-03-25 | End: 2023-03-29 | Stop reason: HOSPADM

## 2023-03-25 RX ORDER — DULOXETIN HYDROCHLORIDE 20 MG/1
20 CAPSULE, DELAYED RELEASE ORAL DAILY
Status: DISCONTINUED | OUTPATIENT
Start: 2023-03-25 | End: 2023-03-27

## 2023-03-25 RX ORDER — SERTRALINE HYDROCHLORIDE 100 MG/1
100 TABLET, FILM COATED ORAL DAILY
Status: DISCONTINUED | OUTPATIENT
Start: 2023-03-26 | End: 2023-03-29 | Stop reason: HOSPADM

## 2023-03-25 RX ORDER — GABAPENTIN 600 MG/1
600 TABLET ORAL 3 TIMES DAILY
COMMUNITY
End: 2023-03-29 | Stop reason: HOSPADM

## 2023-03-25 RX ORDER — PANTOPRAZOLE SODIUM 40 MG/1
40 TABLET, DELAYED RELEASE ORAL
Status: DISCONTINUED | OUTPATIENT
Start: 2023-03-25 | End: 2023-03-29 | Stop reason: HOSPADM

## 2023-03-25 RX ORDER — SODIUM CHLORIDE AND POTASSIUM CHLORIDE 150; 900 MG/100ML; MG/100ML
75 INJECTION, SOLUTION INTRAVENOUS CONTINUOUS
Status: DISCONTINUED | OUTPATIENT
Start: 2023-03-25 | End: 2023-03-27

## 2023-03-25 RX ORDER — ATORVASTATIN CALCIUM 20 MG/1
20 TABLET, FILM COATED ORAL NIGHTLY
Status: DISCONTINUED | OUTPATIENT
Start: 2023-03-25 | End: 2023-03-25

## 2023-03-25 RX ORDER — POTASSIUM CHLORIDE 1.5 G/1.77G
40 POWDER, FOR SOLUTION ORAL AS NEEDED
Status: DISCONTINUED | OUTPATIENT
Start: 2023-03-25 | End: 2023-03-25

## 2023-03-25 RX ORDER — LEVOTHYROXINE SODIUM 0.15 MG/1
150 TABLET ORAL
Status: DISCONTINUED | OUTPATIENT
Start: 2023-03-26 | End: 2023-03-29 | Stop reason: HOSPADM

## 2023-03-25 RX ORDER — ATORVASTATIN CALCIUM 20 MG/1
10 TABLET, FILM COATED ORAL NIGHTLY
Status: DISCONTINUED | OUTPATIENT
Start: 2023-03-25 | End: 2023-03-29 | Stop reason: HOSPADM

## 2023-03-25 RX ORDER — AMLODIPINE BESYLATE 5 MG/1
10 TABLET ORAL DAILY
Status: DISCONTINUED | OUTPATIENT
Start: 2023-03-25 | End: 2023-03-27

## 2023-03-25 RX ORDER — ISOSORBIDE MONONITRATE 30 MG/1
30 TABLET, EXTENDED RELEASE ORAL DAILY
Status: DISCONTINUED | OUTPATIENT
Start: 2023-03-25 | End: 2023-03-27

## 2023-03-25 RX ORDER — SERTRALINE HYDROCHLORIDE 100 MG/1
100 TABLET, FILM COATED ORAL DAILY
Status: DISCONTINUED | OUTPATIENT
Start: 2023-03-25 | End: 2023-03-25

## 2023-03-25 RX ORDER — POTASSIUM CHLORIDE 750 MG/1
40 TABLET, FILM COATED, EXTENDED RELEASE ORAL AS NEEDED
Status: DISCONTINUED | OUTPATIENT
Start: 2023-03-25 | End: 2023-03-25

## 2023-03-25 RX ORDER — IPRATROPIUM BROMIDE AND ALBUTEROL SULFATE 2.5; .5 MG/3ML; MG/3ML
3 SOLUTION RESPIRATORY (INHALATION)
Status: DISCONTINUED | OUTPATIENT
Start: 2023-03-25 | End: 2023-03-25

## 2023-03-25 RX ORDER — MORPHINE SULFATE 2 MG/ML
2 INJECTION, SOLUTION INTRAMUSCULAR; INTRAVENOUS EVERY 4 HOURS PRN
Status: DISCONTINUED | OUTPATIENT
Start: 2023-03-25 | End: 2023-03-29 | Stop reason: HOSPADM

## 2023-03-25 RX ORDER — GABAPENTIN 300 MG/1
300 CAPSULE ORAL DAILY
COMMUNITY
End: 2023-03-29 | Stop reason: HOSPADM

## 2023-03-25 RX ADMIN — ISOSORBIDE MONONITRATE 30 MG: 30 TABLET, EXTENDED RELEASE ORAL at 14:45

## 2023-03-25 RX ADMIN — ACETAMINOPHEN 650 MG: 325 TABLET, FILM COATED ORAL at 09:11

## 2023-03-25 RX ADMIN — NITROGLYCERIN 0.4 MG: 0.4 TABLET SUBLINGUAL at 11:12

## 2023-03-25 RX ADMIN — ACETAMINOPHEN 650 MG: 325 TABLET, FILM COATED ORAL at 00:03

## 2023-03-25 RX ADMIN — ATORVASTATIN CALCIUM 10 MG: 20 TABLET, FILM COATED ORAL at 20:40

## 2023-03-25 RX ADMIN — POTASSIUM CHLORIDE 40 MEQ: 750 TABLET, EXTENDED RELEASE ORAL at 11:03

## 2023-03-25 RX ADMIN — DULOXETINE HYDROCHLORIDE 20 MG: 20 CAPSULE, DELAYED RELEASE ORAL at 14:45

## 2023-03-25 RX ADMIN — TRAZODONE HYDROCHLORIDE 150 MG: 100 TABLET ORAL at 20:40

## 2023-03-25 RX ADMIN — PANTOPRAZOLE SODIUM 40 MG: 40 TABLET, DELAYED RELEASE ORAL at 04:29

## 2023-03-25 RX ADMIN — METOPROLOL TARTRATE 12.5 MG: 25 TABLET, FILM COATED ORAL at 20:40

## 2023-03-25 RX ADMIN — PANTOPRAZOLE SODIUM 40 MG: 40 TABLET, DELAYED RELEASE ORAL at 16:55

## 2023-03-25 RX ADMIN — SODIUM CHLORIDE 75 ML/HR: 4.5 INJECTION, SOLUTION INTRAVENOUS at 00:01

## 2023-03-25 RX ADMIN — MORPHINE SULFATE 2 MG: 2 INJECTION, SOLUTION INTRAMUSCULAR; INTRAVENOUS at 22:05

## 2023-03-25 RX ADMIN — AMLODIPINE BESYLATE 10 MG: 5 TABLET ORAL at 12:26

## 2023-03-25 RX ADMIN — LEVOTHYROXINE SODIUM 100 MCG: 0.1 TABLET ORAL at 04:29

## 2023-03-25 RX ADMIN — NITROGLYCERIN 0.4 MG: 0.4 TABLET SUBLINGUAL at 11:03

## 2023-03-25 RX ADMIN — ASPIRIN 81 MG: 81 TABLET, CHEWABLE ORAL at 08:56

## 2023-03-25 RX ADMIN — MORPHINE SULFATE 2 MG: 2 INJECTION, SOLUTION INTRAMUSCULAR; INTRAVENOUS at 12:26

## 2023-03-25 RX ADMIN — POTASSIUM CHLORIDE AND SODIUM CHLORIDE 75 ML/HR: 900; 150 INJECTION, SOLUTION INTRAVENOUS at 12:26

## 2023-03-25 RX ADMIN — MORPHINE SULFATE 2 MG: 2 INJECTION, SOLUTION INTRAMUSCULAR; INTRAVENOUS at 16:55

## 2023-03-25 NOTE — PLAN OF CARE
Goal Outcome Evaluation:              Outcome Evaluation: A&Ox4, confused at times. RA. C/o chest pain, PRN nitro given. Pt states little improvement, cardio aware. Cardio ordered morphine for pain mangament, pt states an improvement. Stress test ordered, to be completed tomorrow. NPO at midnight. Potassium low at 3.1, IVF with 20 K+ infusing. Troponin trending down. ECHO. Incontinence care as needed, barrier cream applied. Turns as tolerated. No other compliants this shift. VSS.

## 2023-03-25 NOTE — PLAN OF CARE
Goal Outcome Evaluation:  Plan of Care Reviewed With: patient        Progress: no change  Outcome Evaluation: Patient newly admitted for weakness. Patient alert and oriented X4. On room air. states is bedridden at home d/t bad knees. Patient explains he has a friend that comes in and helps him although he feels he needs additional help possible placement to SNF. VSS. brief in use. Patient unable to recall the home medications.

## 2023-03-25 NOTE — H&P
History and physical    Primary care physician  Dr. Atkinson    Chief complaint  Generalized weakness    History of present illness  73 white male with history of hypertension hyperlipidemia hypothyroidism osteoarthritis and depression who has been noncompliant with medications presented to [facility] room with generalized weakness.  Patient unable to take care of himself.  Patient has not taken his medication for last several weeks.  Patient denies any headache dizziness chest pain shortness of breath abdominal pain nausea vomiting diarrhea.  Patient work-up in ER revealed severe hypothyroidism who has been noncompliant with medication and also elevated troponin with no chest pain admit for management.  Patient lives by himself and willing to go to nursing home after stabilization.    PAST MEDICAL HISTORY  • Arthritis     • Colon cancer (CMS/HCC) 2014   • Colon polyps 08/06/2014   • Depression     • Gout     • Hearing loss     • Hyperlipemia     • Hypertension     • Hypothyroidism     • Kidney stones     • Polio     • Rheumatoid arthritis (CMS/HCC)     • Sleep apnea        PAST SURGICAL HISTORY              Procedure Laterality Date   • CHOLECYSTECTOMY WITH INTRAOPERATIVE CHOLANGIOGRAM N/A 11/10/2017     Procedure: CHOLECYSTECTOMY LAPAROSCOPIC INTRAOPERATIVE CHOLANGIOGRAM;  Surgeon: Elian Carnes MD;  Location: [facility] MAIN OR;  Service:    • COLECTOMY PARTIAL / TOTAL Right 09/08/2014     Open right colectomy-Dr. Elian Carnes   • COLONOSCOPY N/A 3/15/2017     Procedure: COLONOSCOPY TO ANASTAMOSIS AND TI WITH COLD SNARE POLYPECTOMY ;  Surgeon: Elian Carnes MD;  Location: [facility] ENDOSCOPY;  Service:    • COLONOSCOPY N/A 08/06/2014     1 cm cecal polyp, 1 cm transverse polyp, 1 cm sigmoid colon polyp, mass in the ascending colon occupying 1/3 of the colonic circumference, mass proximal to the transverse colon occupying 1/4 colonic circumference-Dr. Elian Carnes   • COLONOSCOPY N/A 5/1/2019  "    Procedure: COLONOSCOPY to ILEOCOLIC ANASTAMOSIS WITH HOT SNARE/COLD BX POLYPECTOMY;  Surgeon: Elian Carnes MD;  Location: [facility] ENDOSCOPY;  Service: General   • FOOT SURGERY Right 2008, 2009   • GASTRIC BANDING N/A 12/28/2009   • NOSE SURGERY N/A 1989   • ORIF TIBIA/FIBULA FRACTURES Left 12/17/2017     Procedure: TIBIA/FIBULA OPEN REDUCTION INTERNAL FIXATION;  Surgeon: Lai Acevedo Jr., MD;  Location: [facility] MAIN OR;  Service:    • ORIF TIBIA/FIBULA FRACTURES Right     • TOTAL KNEE ARTHROPLASTY Bilateral 1/2002, 6/2002 1/2002-left knee 6/2002[facility]         FAMILY HISTORY           Problem Relation Age of Onset   • Lung cancer Mother     • Hypertension Father     • Diabetes Sister     • Hypertension Brother     • Hypertension Brother     • Lung cancer Brother        SOCIAL HISTORY           Socioeconomic History   • Marital status: Single   Tobacco Use   • Smoking status: Never   • Smokeless tobacco: Never   Vaping Use   • Vaping Use: Never used   Substance and Sexual Activity   • Alcohol use: No   • Drug use: No   • Sexual activity: Defer         ALLERGIES  Keflex [cephalexin]  Home medications reviewed     REVIEW OF SYSTEMS  All systems reviewed and negative except for those discussed in HPI.      PHYSICAL EXAM  Blood pressure 149/78, pulse 69, temperature 98.8 °F (37.1 °C), temperature source Oral, resp. rate 18, height 177.8 cm (70\"), weight (!) 137 kg (302 lb), SpO2 95 %.    Constitutional:       Appearance: Normal appearance. He is obese.   HENT:      Head: Normocephalic and atraumatic.      Mouth/Throat:      Mouth: Mucous membranes are moist.   Eyes:      Extraocular Movements: Extraocular movements intact.      Pupils: Pupils are equal, round, and reactive to light.   Cardiovascular:      Rate and Rhythm: Normal rate and regular rhythm.   Pulmonary:      Effort: Pulmonary effort is normal.      Breath sounds: Normal breath sounds.   Abdominal:      General: " There is no distension.      Palpations: Abdomen is soft.      Tenderness: There is no abdominal tenderness.   Musculoskeletal:      Cervical back: Normal range of motion and neck supple.   Neurological:      Mental Status: He is alert and oriented to person, place, and time. Mental status is at baseline.   Psychiatric:         Mood and Affect: Mood normal.         Behavior: Behavior normal.         Thought Content: Thought content normal.         Judgment: Judgment normal.      LAB RESULTS  Lab Results (last 24 hours)     Procedure Component Value Units Date/Time    BNP [[id number]]  (Normal) Collected: 03/25/23 1309    Specimen: Blood Updated: 03/25/23 1359     proBNP 200.0 pg/mL     Narrative:      Among patients with dyspnea, NT-proBNP is highly sensitive for the detection of acute congestive heart failure. In addition NT-proBNP of <300 pg/ml effectively rules out acute congestive heart failure with 99% negative predictive value.    Results may be falsely decreased if patient taking Biotin.      T4, Free [[id number]]  (Abnormal) Collected: 03/25/23 1309    Specimen: Blood Updated: 03/25/23 1359     Free T4 0.47 ng/dL     Narrative:      Results may be falsely increased if patient taking Biotin.      Magnesium [[id number]]  (Normal) Collected: 03/25/23 0815    Specimen: Blood Updated: 03/25/23 1118     Magnesium 2.1 mg/dL     High Sensitivity Troponin T 2Hr [[id number]]  (Abnormal) Collected: 03/25/23 0815    Specimen: Blood Updated: 03/25/23 0928     HS Troponin T 56 ng/L      Troponin T Delta -1 ng/L     Narrative:      High Sensitive Troponin T Reference Range:  <10.0 ng/L- Negative Female for AMI  <15.0 ng/L- Negative Male for AMI  >=10 - Abnormal Female indicating possible myocardial injury.  >=15 - Abnormal Male indicating possible myocardial injury.   Clinicians would have to utilize clinical acumen, EKG, Troponin, and serial changes to determine if it is an Acute Myocardial Infarction or myocardial injury due  to an underlying chronic condition.         High Sensitivity Troponin T [[id number]]  (Abnormal) Collected: 03/25/23 0613    Specimen: Blood Updated: 03/25/23 0736     HS Troponin T 57 ng/L     Narrative:      High Sensitive Troponin T Reference Range:  <10.0 ng/L- Negative Female for AMI  <15.0 ng/L- Negative Male for AMI  >=10 - Abnormal Female indicating possible myocardial injury.  >=15 - Abnormal Male indicating possible myocardial injury.   Clinicians would have to utilize clinical acumen, EKG, Troponin, and serial changes to determine if it is an Acute Myocardial Infarction or myocardial injury due to an underlying chronic condition.         Comprehensive Metabolic Panel [[id number]]  (Abnormal) Collected: 03/25/23 0613    Specimen: Blood Updated: 03/25/23 0733     Glucose 99 mg/dL      BUN 18 mg/dL      Creatinine 1.01 mg/dL      Sodium 140 mmol/L      Potassium 3.1 mmol/L      Chloride 101 mmol/L      CO2 28.3 mmol/L      Calcium 9.6 mg/dL      Total Protein 7.1 g/dL      Albumin 3.9 g/dL      ALT (SGPT) 25 U/L      AST (SGOT) 36 U/L      Alkaline Phosphatase 43 U/L      Total Bilirubin 1.0 mg/dL      Globulin 3.2 gm/dL      A/G Ratio 1.2 g/dL      BUN/Creatinine Ratio 17.8     Anion Gap 10.7 mmol/L      eGFR 78.5 mL/min/1.73     Narrative:      GFR Normal >60  Chronic Kidney Disease <60  Kidney Failure <15    The GFR formula is only valid for adults with stable renal function between ages 18 and 70.    CBC & Differential [[id number]]  (Abnormal) Collected: 03/25/23 0613    Specimen: Blood Updated: 03/25/23 0724    Narrative:      The following orders were created for panel order CBC & Differential.  Procedure                               Abnormality         Status                     ---------                               -----------         ------                     CBC Auto Differential[[id number]]        Abnormal            Final result                 Please view results for these tests on the  individual orders.    CBC Auto Differential [[id number]]  (Abnormal) Collected: 03/25/23 0613    Specimen: Blood Updated: 03/25/23 0724     WBC 5.51 10*3/mm3      RBC 4.30 10*6/mm3      Hemoglobin 12.5 g/dL      Hematocrit 38.6 %      MCV 89.8 fL      MCH 29.1 pg      MCHC 32.4 g/dL      RDW 13.6 %      RDW-SD 44.1 fl      MPV 10.7 fL      Platelets 132 10*3/mm3      Neutrophil % 61.4 %      Lymphocyte % 24.9 %      Monocyte % 12.2 %      Eosinophil % 0.7 %      Basophil % 0.4 %      Neutrophils, Absolute 3.39 10*3/mm3      Lymphocytes, Absolute 1.37 10*3/mm3      Monocytes, Absolute 0.67 10*3/mm3      Eosinophils, Absolute 0.04 10*3/mm3      Basophils, Absolute 0.02 10*3/mm3     Single High Sensitivity Troponin T [[id number]]  (Abnormal) Collected: 03/24/23 1729    Specimen: Blood Updated: 03/24/23 1831     HS Troponin T 69 ng/L     Narrative:      High Sensitive Troponin T Reference Range:  <10.0 ng/L- Negative Female for AMI  <15.0 ng/L- Negative Male for AMI  >=10 - Abnormal Female indicating possible myocardial injury.  >=15 - Abnormal Male indicating possible myocardial injury.   Clinicians would have to utilize clinical acumen, EKG, Troponin, and serial changes to determine if it is an Acute Myocardial Infarction or myocardial injury due to an underlying chronic condition.         Single High Sensitivity Troponin T [[id number]]  (Abnormal) Collected: 03/24/23 1515    Specimen: Blood Updated: 03/24/23 1624     HS Troponin T 47 ng/L     Narrative:      High Sensitive Troponin T Reference Range:  <10.0 ng/L- Negative Female for AMI  <15.0 ng/L- Negative Male for AMI  >=10 - Abnormal Female indicating possible myocardial injury.  >=15 - Abnormal Male indicating possible myocardial injury.   Clinicians would have to utilize clinical acumen, EKG, Troponin, and serial changes to determine if it is an Acute Myocardial Infarction or myocardial injury due to an underlying chronic condition.         Urinalysis With  Microscopic If Indicated (No Culture) - Urine, Clean Catch [[id number]]  (Abnormal) Collected: 03/24/23 1539    Specimen: Urine, Clean Catch Updated: 03/24/23 1608     Color, UA Yellow     Appearance, UA Cloudy     pH, UA 5.5     Specific Gravity, UA 1.022     Glucose, UA Negative     Ketones, UA Trace     Bilirubin, UA Negative     Blood, UA Large (3+)     Protein, UA Trace     Leuk Esterase, UA Negative     Nitrite, UA Negative     Urobilinogen, UA 1.0 E.U./dL    Urinalysis, Microscopic Only - Urine, Clean Catch [[id number]]  (Abnormal) Collected: 03/24/23 1539    Specimen: Urine, Clean Catch Updated: 03/24/23 1608     RBC, UA 13-20 /HPF      WBC, UA 0-2 /HPF      Bacteria, UA None Seen /HPF      Squamous Epithelial Cells, UA 0-2 /HPF      Hyaline Casts, UA 0-2 /LPF      Methodology Automated Microscopy    TSH [[id number]]  (Abnormal) Collected: 03/24/23 1515    Specimen: Blood Updated: 03/24/23 1549     TSH 65.400 uIU/mL     T4, Free [[id number]]  (Abnormal) Collected: 03/24/23 1515    Specimen: Blood Updated: 03/24/23 1549     Free T4 0.40 ng/dL     Narrative:      Results may be falsely increased if patient taking Biotin.      Comprehensive Metabolic Panel [[id number]]  (Abnormal) Collected: 03/24/23 1515    Specimen: Blood Updated: 03/24/23 1542     Glucose 112 mg/dL      BUN 18 mg/dL      Creatinine 1.19 mg/dL      Sodium 144 mmol/L      Potassium 3.6 mmol/L      Chloride 102 mmol/L      CO2 31.9 mmol/L      Calcium 10.0 mg/dL      Total Protein 7.7 g/dL      Albumin 4.0 g/dL      ALT (SGPT) 28 U/L      AST (SGOT) 29 U/L      Alkaline Phosphatase 45 U/L      Total Bilirubin 1.0 mg/dL      Globulin 3.7 gm/dL      A/G Ratio 1.1 g/dL      BUN/Creatinine Ratio 15.1     Anion Gap 10.1 mmol/L      eGFR 64.5 mL/min/1.73     Narrative:      GFR Normal >60  Chronic Kidney Disease <60  Kidney Failure <15    The GFR formula is only valid for adults with stable renal function between ages 18 and 70.    CBC & Differential  [[id number]]  (Abnormal) Collected: 03/24/23 1515    Specimen: Blood Updated: 03/24/23 1529    Narrative:      The following orders were created for panel order CBC & Differential.  Procedure                               Abnormality         Status                     ---------                               -----------         ------                     CBC Auto Differential[[id number]]        Abnormal            Final result                 Please view results for these tests on the individual orders.    CBC Auto Differential [[id number]]  (Abnormal) Collected: 03/24/23 1515    Specimen: Blood Updated: 03/24/23 1529     WBC 4.71 10*3/mm3      RBC 4.48 10*6/mm3      Hemoglobin 13.3 g/dL      Hematocrit 39.8 %      MCV 88.8 fL      MCH 29.7 pg      MCHC 33.4 g/dL      RDW 13.1 %      RDW-SD 42.9 fl      MPV 9.9 fL      Platelets 131 10*3/mm3      Neutrophil % 66.4 %      Lymphocyte % 21.0 %      Monocyte % 10.4 %      Eosinophil % 0.8 %      Basophil % 0.6 %      Neutrophils, Absolute 3.12 10*3/mm3      Lymphocytes, Absolute 0.99 10*3/mm3      Monocytes, Absolute 0.49 10*3/mm3      Eosinophils, Absolute 0.04 10*3/mm3      Basophils, Absolute 0.03 10*3/mm3         Imaging Results (Last 24 Hours)     Procedure Component Value Units Date/Time    XR Chest 1 View [[id number]] Collected: 03/24/23 1522     Updated: 03/24/23 1528    Narrative:      XR CHEST 1 VW-     HISTORY: Male who is 73 years-old,  weakness     TECHNIQUE: Frontal view of the chest     COMPARISON: 12/17/2017     FINDINGS: The heart size is borderline. Aorta is calcified. Pulmonary  vasculature is unremarkable. No focal pulmonary consolidation, pleural  effusion, or pneumothorax. No acute osseous process.       Impression:      No focal pulmonary consolidation. Borderline heart size.  Follow-up as clinical indications persist.     This report was finalized on 3/24/2023 3:25 PM by Dr. Eric Castellano M.D.           ECG 12 Lead         Component  Ref Range &  Units 09:23  (3/25/23) 1 d ago  (3/24/23) 1 d ago  (3/24/23)   QT Interval  ms 484 P  550  562    Resulting Agency  ECG  ECG  ECG             HEART RATE= 75  bpm  RR Interval= 800  ms  NE Interval= 185  ms  P Horizontal Axis= 0  deg  P Front Axis= 29  deg  QRSD Interval= 98  ms  QT Interval= 484  ms  QRS Axis= 38  deg  T Wave Axis= 85  deg  - ABNORMAL ECG -  Sinus rhythm  Low voltage, precordial leads  Nonspecific T abnrm, anterolateral leads  Prolonged QT interval             Current Facility-Administered Medications:   •  acetaminophen (TYLENOL) tablet 650 mg, 650 mg, Oral, Q6H PRN, Gregg Solorzano MD, 650 mg at 03/25/23 0911  •  amLODIPine (NORVASC) tablet 10 mg, 10 mg, Oral, Daily, Gregg Solorzano MD, 10 mg at 03/25/23 1226  •  aspirin chewable tablet 81 mg, 81 mg, Oral, Daily, Gregg Solorzano MD, 81 mg at 03/25/23 0856  •  atorvastatin (LIPITOR) tablet 10 mg, 10 mg, Oral, Nightly, Gregg Solorzano MD  •  DULoxetine (CYMBALTA) DR capsule 20 mg, 20 mg, Oral, Daily, Gregg Solorzano MD  •  Influenza Vac High-Dose Quad (FLUZONE HIGH DOSE) injection 0.7 mL, 0.7 mL, Intramuscular, During Hospitalization, Gregg Solorzano MD  •  isosorbide mononitrate (IMDUR) 24 hr tablet 30 mg, 30 mg, Oral, Daily, Gregg Solorzano MD  •  [START ON 3/26/2023] levothyroxine (SYNTHROID, LEVOTHROID) tablet 150 mcg, 150 mcg, Oral, Q AM, Gregg Solorzano MD  •  metoprolol tartrate (LOPRESSOR) tablet 12.5 mg, 12.5 mg, Oral, Q12H, Gregg Solorzano MD  •  morphine injection 2 mg, 2 mg, Intravenous, Q4H PRN, Crissy Melendrez MD, 2 mg at 03/25/23 1226  •  nitroglycerin (NITROSTAT) SL tablet 0.4 mg, 0.4 mg, Sublingual, Q5 Min PRN, Hillary Crane, APRN, 0.4 mg at 03/25/23 1112  •  pantoprazole (PROTONIX) EC tablet 40 mg, 40 mg, Oral, Q AM, Gregg Solorzano MD, 40 mg at 03/25/23 0429  •  sertraline (ZOLOFT) tablet 100 mg, 100 mg, Oral, Daily, Gregg Solorzano MD  •  sodium chloride 0.9 % with KCl 20 mEq/L infusion, 75 mL/hr, Intravenous, Continuous, Gregg Solorzano,  MD, Last Rate: 75 mL/hr at 03/25/23 1226, 75 mL/hr at 03/25/23 1226  •  traZODone (DESYREL) tablet 150 mg, 150 mg, Oral, Nightly, Christina Solorzano MD    ASSESSMENT  Severe hypothyroidism  Elevated troponin  Hypertension  Hyperlipidemia  Morbidly obese  Depression  Noncompliance with medications  Gastroesophageal reflux disease    PLAN  Admit  Serial cardiac enzymes and EKG  Check 2D echo  Cardiology consult  Resume home medications  Stress ulcer DVT prophylaxis  Psych to follow patient  Supportive care  PT OT  Patient is full code  Discussed with nursing staff  Follow closely further recommendation current hospital course    CHRISTINA SOLORZANO MD

## 2023-03-25 NOTE — THERAPY EVALUATION
Patient Name: Nicanor TAN Ar  : 1950    MRN: [id number]                              Today's Date: 3/25/2023       Admit Date: 3/24/2023    Visit Dx:     ICD-10-CM ICD-9-CM   1. Generalized weakness  R53.1 780.79   2. Elevated TSH  R79.89 794.5   3. Elevated troponin  R77.8 790.6     Patient Active Problem List   Diagnosis   • History of colon polyps   • Acute pancreatitis due to calculus of common bile duct   • Abdominal pain, generalized   • Closed left tibial fracture   • Closed bimalleolar fracture of left ankle   • Left tibial fracture   • Closed fracture of upper end of left fibula   • Immobility syndrome   • HTN (hypertension)   • Generalized abdominal pain   • Other constipation   • Diarrhea   • Hx of adenomatous colonic polyps   • Generalized weakness     Past Medical History:   Diagnosis Date   • Arthritis    • Colon cancer (HCC) 2014    tubulovillous adenomas with adenocarcinoma in situ s/p right open colectomy   • Colon polyps 2014    1) cecal polyps: fragments of adenomatous polyp with low grade-dysplasia 2) ascending colon mass bx: fragments of tubulovillous adenoma w/ low grade dysplasia 3) transverse colon polyp: fragments of adenomatous polyp with low grade dysplasia 4) sigmoid polyp: fragments of adenomatous polyp with low grade dysplasia   • Depression    • Gout    • Hearing loss    • Hyperlipemia    • Hypertension    • Hypothyroidism    • Kidney stones    • Polio    • Rheumatoid arthritis (HCC)    • Sleep apnea      Past Surgical History:   Procedure Laterality Date   • CHOLECYSTECTOMY WITH INTRAOPERATIVE CHOLANGIOGRAM N/A 11/10/2017    Procedure: CHOLECYSTECTOMY LAPAROSCOPIC INTRAOPERATIVE CHOLANGIOGRAM;  Surgeon: Elian Carnes MD;  Location: [facility];  Service:    • COLECTOMY PARTIAL / TOTAL Right 2014    Open right colectomy-Dr. Elian Carnes   • COLONOSCOPY N/A 3/15/2017    Procedure: COLONOSCOPY TO ANASTAMOSIS AND TI WITH COLD SNARE POLYPECTOMY ;  Surgeon:  Elian Carnes MD;  Location: [facility] ENDOSCOPY;  Service:    • COLONOSCOPY N/A 08/06/2014    1 cm cecal polyp, 1 cm transverse polyp, 1 cm sigmoid colon polyp, mass in the ascending colon occupying 1/3 of the colonic circumference, mass proximal to the transverse colon occupying 1/4 colonic circumference-Dr. Elian Carnes   • COLONOSCOPY N/A 5/1/2019    Procedure: COLONOSCOPY to ILEOCOLIC ANASTAMOSIS WITH HOT SNARE/COLD BX POLYPECTOMY;  Surgeon: Elian Carnes MD;  Location: [facility] ENDOSCOPY;  Service: General   • FOOT SURGERY Right 2008, 2009   • GASTRIC BANDING N/A 12/28/2009   • NOSE SURGERY N/A 1989   • ORIF TIBIA/FIBULA FRACTURES Left 12/17/2017    Procedure: TIBIA/FIBULA OPEN REDUCTION INTERNAL FIXATION;  Surgeon: Lai Acevedo Jr., MD;  Location: [facility] MAIN OR;  Service:    • ORIF TIBIA/FIBULA FRACTURES Right    • TOTAL KNEE ARTHROPLASTY Bilateral 1/2002, 6/2002 1/2002-left knee 6/2002-right knee-Dr. Jaun Sosa [facility], [place]      General Information     Row Name 03/25/23 1100          Physical Therapy Time and Intention    Document Type evaluation  Pt. admitted from home with general weakness and inability to care for himself  -MS     Mode of Treatment physical therapy;individual therapy  -MS     Row Name 03/25/23 1100          General Information    Patient Profile Reviewed yes  -MS     Prior Level of Function --  Pt. reports that he is basically bedridden at home and it's been ~ 1 year since he has been able to stand on his feet.  -MS     Existing Precautions/Restrictions fall   Exit alarm  -MS     Barriers to Rehab previous functional deficit  -MS     Row Name 03/25/23 1100          Cognition    Orientation Status (Cognition) oriented to;person;place  -MS           User Key  (r) = Recorded By, (t) = Taken By, (c) = Cosigned By    Initials Name Provider Type    MS Elvis Tejeda, PT Physical Therapist               Mobility     Row Name 03/25/23 1101          Bed Mobility    Bed Mobility  supine-sit;sit-supine  -MS     Supine-Sit Bailey (Bed Mobility) dependent (less than 25% patient effort);maximum assist (25% patient effort)  -MS     Comment, (Bed Mobility) Attempted bed mobility but pt. unable to sit fully upright due to weakness, fatigue, and overall pain.  -MS           User Key  (r) = Recorded By, (t) = Taken By, (c) = Cosigned By    Initials Name Provider Type    Elvis Cuevas, PT Physical Therapist               Obj/Interventions     Row Name 03/25/23 1101          Range of Motion Comprehensive    Comment, General Range of Motion BUE (WFL's);  BLE (Imp. 25%)  -MS     Row Name 03/25/23 1101          Strength Comprehensive (MMT)    Comment, General Manual Muscle Testing (MMT) Assessment BUE (3+/5); BLE (3-/5)  -MS     Row Name 03/25/23 1101          Motor Skills    Therapeutic Exercise --  BUE(AROM)/BLE (AAROM) ther. ex. program x 5 reps completed (Ankle pumps, Heel Slides, Hip Abduction, Shld Shrugs, Shld Flexion)  -MS           User Key  (r) = Recorded By, (t) = Taken By, (c) = Cosigned By    Initials Name Provider Type    MS Elvis Tejeda, PT Physical Therapist               Goals/Plan     Row Name 03/25/23 1103          Bed Mobility Goal 1 (PT)    Activity/Assistive Device (Bed Mobility Goal 1, PT) bed mobility activities, all  -MS     Bailey Level/Cues Needed (Bed Mobility Goal 1, PT) moderate assist (50-74% patient effort)  Assist x 2  -MS     Time Frame (Bed Mobility Goal 1, PT) long term goal (LTG);1 week  -MS     Row Name 03/25/23 1103          Transfer Goal 1 (PT)    Activity/Assistive Device (Transfer Goal 1, PT) bed-to-chair/chair-to-bed;sit-to-stand/stand-to-sit  With A.A.D.  -MS     Bailey Level/Cues Needed (Transfer Goal 1, PT) moderate assist (50-74% patient effort)  Assist x 2  -MS     Time Frame (Transfer Goal 1, PT) long term goal (LTG);1 week  -MS     Row Name 03/25/23 1103          Therapy Assessment/Plan (PT)    Planned Therapy Interventions  (PT) balance training;bed mobility training;home exercise program;patient/family education;postural re-education;transfer training;strengthening;ROM (range of motion)  -MS           User Key  (r) = Recorded By, (t) = Taken By, (c) = Cosigned By    Initials Name Provider Type    Elvis Cuevas, PT Physical Therapist               Clinical Impression     Row Name 03/25/23 1103          Pain    Pretreatment Pain Rating 8/10  -MS     Posttreatment Pain Rating 8/10  -MS     Pain Location generalized  -MS     Pain Location - back  -MS     Pre/Posttreatment Pain Comment Pt. reports pain in his back and entire BLE's.  -MS     Pain Intervention(s) Medication (See MAR);Nursing Notified;Repositioned  -MS     Row Name 03/25/23 1103          Plan of Care Review    Plan of Care Reviewed With patient  -MS     Row Name 03/25/23 1103          Therapy Assessment/Plan (PT)    Rehab Potential (PT) fair, will monitor progress closely  -MS     Criteria for Skilled Interventions Met (PT) meets criteria  -MS     Therapy Frequency (PT) 6 times/wk  -MS     Row Name 03/25/23 1103          Positioning and Restraints    Pre-Treatment Position in bed  -MS     Post Treatment Position bed  -MS     In Bed notified nsg;supine;call light within reach;encouraged to call for assist;exit alarm on  All lines intact.  -MS           User Key  (r) = Recorded By, (t) = Taken By, (c) = Cosigned By    Initials Name Provider Type    MS TejedaElvis, PT Physical Therapist               Outcome Measures     Row Name 03/25/23 1104          How much help from another person do you currently need...    Turning from your back to your side while in flat bed without using bedrails? 2  -MS     Moving from lying on back to sitting on the side of a flat bed without bedrails? 2  -MS     Moving to and from a bed to a chair (including a wheelchair)? 1  -MS     Standing up from a chair using your arms (e.g., wheelchair, bedside chair)? 1  -MS     Climbing 3-5 steps  with a railing? 1  -MS     To walk in hospital room? 1  -MS     AM-PAC 6 Clicks Score (PT) 8  -MS     Highest level of mobility 3 --> Sat at edge of bed  -MS     Row Name 03/25/23 1104          Functional Assessment    Outcome Measure Options AM-PAC 6 Clicks Basic Mobility (PT)  -MS           User Key  (r) = Recorded By, (t) = Taken By, (c) = Cosigned By    Initials Name Provider Type    MS Elvis Tejeda PT Physical Therapist                             Physical Therapy Education     Title: PT OT SLP Therapies (In Progress)     Topic: Physical Therapy (In Progress)     Point: Mobility training (Done)     Learning Progress Summary           Patient Acceptance, E,D, VU,NR by MS at 3/25/2023 1105                   Point: Home exercise program (Done)     Learning Progress Summary           Patient Acceptance, E,D, VU,NR by MS at 3/25/2023 1105                   Point: Body mechanics (Done)     Learning Progress Summary           Patient Acceptance, E,D, VU,NR by MS at 3/25/2023 1105                   Point: Precautions (Not Started)     Learner Progress:  Not documented in this visit.                      User Key     Initials Effective Dates Name Provider Type Discipline    MS 06/16/21 -  Elvis Tejeda PT Physical Therapist PT              PT Recommendation and Plan  Planned Therapy Interventions (PT): balance training, bed mobility training, home exercise program, patient/family education, postural re-education, transfer training, strengthening, ROM (range of motion)  Plan of Care Reviewed With: patient  Outcome Evaluation: Pt. is a 73 year old Male admitted to the hospital with general weakness and inability to care for himself at home. Pt. reports that prior to admission he was basically bedridden and hadn't stood fully upright on his feet in ~ 1 year.  Pt. currently presents with decreased strength decreased balance, decreased ROM, and decreased tolerance to fucntional activity.  This AM, bed mobility  attempted with Max-Dep. assist x 1 but pt. unable to sit fully upright due to weakness, fatigue, and pain.  BUE/LE ther. ex. program x 5 reps completed for general strengthening.  Pt. will benefit from skilled inpt. P.T. to address his functional deficits and to assist pt. in regaining his maximum level of independence with functional mobility.     Time Calculation:    PT Charges     Row Name 03/25/23 1109             Time Calculation    Start Time 0950  -MS      Stop Time 1005  -MS      Time Calculation (min) 15 min  -MS      PT Received On 03/25/23  -MS      PT - Next Appointment 03/27/23  -MS      PT Goal Re-Cert Due Date 04/01/23  -MS         Time Calculation- PT    Total Timed Code Minutes- PT 14 minute(s)  -MS            User Key  (r) = Recorded By, (t) = Taken By, (c) = Cosigned By    Initials Name Provider Type    Elvis Cuevas, PT Physical Therapist              Therapy Charges for Today     Code Description Service Date Service Provider Modifiers Qty    86689986186 HC PT EVAL MOD COMPLEXITY 2 3/25/2023 Elvis Tejeda, PT GP 1    [id number]  PT THER PROC EA 15 MIN 3/25/2023 Elvis Tejeda, PT GP 1          PT G-Codes  Outcome Measure Options: AM-PAC 6 Clicks Basic Mobility (PT)  AM-PAC 6 Clicks Score (PT): 8  PT Discharge Summary  Anticipated Discharge Disposition (PT): skilled nursing facility    Elvis Tejeda, PT  3/25/2023

## 2023-03-25 NOTE — PLAN OF CARE
Goal Outcome Evaluation:  Plan of Care Reviewed With: patient           Outcome Evaluation: Pt. is a 73 year old Male admitted to the hospital with general weakness and inability to care for himself at home. Pt. reports that prior to admission he was basically bedridden and hadn't stood fully upright on his feet in ~ 1 year.  Pt. currently presents with decreased strength decreased balance, decreased ROM, and decreased tolerance to fucntional activity.  This AM, bed mobility attempted with Max-Dep. assist x 1 but pt. unable to sit fully upright due to weakness, fatigue, and pain.  BUE/LE ther. ex. program x 5 reps completed for general strengthening.  Pt. will benefit from skilled inpt. P.T. to address his functional deficits and to assist pt. in regaining his maximum level of independence with functional mobility.    Patient was not wearing a face mask during this therapy encounter. Therapist used appropriate personal protective equipment including eye protection, mask, and gloves.  Mask used was standard procedure mask. Appropriate PPE was worn during the entire therapy session. Hand hygiene was completed before and after therapy session. Patient is not in enhanced droplet precautions.

## 2023-03-25 NOTE — NURSING NOTE
Pt received caffeine on breakfast tray this AM, notified cardiology. Cardiology said for pt to be NPO tonight at midnight for stress test in the morning 3/26.

## 2023-03-25 NOTE — PAYOR COMM NOTE
"[facility]   &  [facility] Caty Meza  [address]    [address] [place] [address]    [place] [address]    Jonathan Escoto - [phone]  Utilization Review/Room Reservations  Phone: [phone], [phone], [phone] or [phone]  Fax: [phone]  Email: [email or web address]  Please call, fax back, or email with authorization or any questions! Thanks!      REQUESTED CLINICAL  AUTH#[id number]        This fax contains any of the following:  Face Sheet, H&P, progress notes, consults, orders, meds, lab results, labor record, vitals, delivery worksheet, op note, d/c summary.  The information contained in this fax is confidential for the use of the Individual or entity named above. If the reader of this message is not the Intended recipient (or the employee or agent responsible to deliver it to the Intended recipient), you are hereby notified that any dissemination, distribution, or copy of this communication is prohibited. If you have received this communication in error, please notify us by collect telephone call and return the original message to us at the above address at our expense.  Nicanor Haley (73 y.o. Male)     Date of Birth   1950    Social Security Number       Address   [address] [place] [address] [address] [address]    Home Phone   [phone]    MRN   [id number]       [facility]    Marital Status   Single                            Admission Date   3/24/23    Admission Type   Emergency    Admitting Provider   Gregg Solorzano MD    Attending Provider   Gregg Solorzano MD    Department, Room/Bed   [facility], S606/1       Discharge Date       Discharge Disposition       Discharge Destination                               Attending Provider: Gregg Solorzano MD    Allergies: Keflex [Cephalexin]    Isolation: None   Infection: None   Code Status: Prior    Ht: 177.8 cm (70\")   Wt: 137 kg (302 lb)    " Admission Cmt: None   Principal Problem: Generalized weakness [R53.1]                 Active Insurance as of 3/24/2023     Primary Coverage     Payor Plan Insurance Group Employer/Plan Group    ANTHEM MEDICARE REPLACEMENT ANTHEM MEDICARE ADVANTAGE [id number]     Payor Plan Address Payor Plan Phone Number Payor Plan Fax Number Effective Dates    [address] [phone]  4/1/2016 - None Entered    [facility] [id number]       Subscriber Name Subscriber Birth Date Member ID       NIKITA DEE 1950 [id number]           Secondary Coverage     Payor Plan Insurance Group Employer/Plan Group    [place]      Payor Plan Address Payor Plan Phone Number Payor Plan Fax Number Effective Dates    [address] [phone]  3/15/2017 - None Entered    [facility] [id number]       Subscriber Name Subscriber Birth Date Member ID       NIKITA DEE 1950 [id number]                 Emergency Contacts      (Rel.) Home Phone Work Phone Mobile Phone    Gaviota Amezquita (Friend) [phone] -- --            History & Physical    No notes of this type exist for this encounter.            Emergency Department Notes      Liz Kearney RN at 03/24/23 1437        Pt presents to ED via [facility] from home with complaints of weakness, decreased PO and inability take his medications because he is bed bound and unable to care for himself.     Electronically signed by Liz Kearney RN at 03/24/23 1440     Liz Logan PA at 03/24/23 1506     Attestation signed by Jaun Bradley MD at 03/24/23 2117        SHARED APC FACE TO FACE: This visit was performed by both the physician and an APC. I personally evaluated and examined the patient. I performed the entirety of the physical exam and I documented this exam in this attestation or in accompanying documentation.    Jaun Bradley MD 3/24/2023 21:17 EDT                          EMERGENCY DEPARTMENT ENCOUNTER    Room Number:   01/01  Date seen:  3/24/2023  PCP: Gertrude Atkinson MD    Spoken Language:  English  Language interpretation services not needed     HPI:  Chief Complaint: generalized weakness    A complete HPI/ROS/PMH/PSH/SH/FH are unobtainable due to: n/a    Context: Nicanor Haley is a 73 y.o. male presenting to the emergency department complaining of generalized weakness and difficulty taking care of himself at home.  The history is being obtained by the patient and by review of the medical chart.  The patient states that he has had generalized weakness as well as decreased p.o. intake.  He states that he has not been able to physically get in and out of bed independently for the past year.  He states that his friends have been taking care of him, but that the friend that was most recently taking care of him quit yesterday.  He states that he now has nobody to take care of him at home, and is unable to take care of himself.  He states that none of his family members live locally.  He denies fever, sore throat, cough, chest pain, shortness of breath, abdominal pain, nausea, vomiting or diarrhea.  The patient states that he has been compliant with all home medications.    PAST MEDICAL HISTORY  Active Ambulatory Problems     Diagnosis Date Noted   • History of colon polyps 03/15/2017   • Acute pancreatitis due to calculus of common bile duct 11/07/2017   • Abdominal pain, generalized 11/07/2017   • Closed left tibial fracture 12/18/2017   • Closed bimalleolar fracture of left ankle 12/18/2017   • Left tibial fracture 12/20/2017   • Closed fracture of upper end of left fibula 10/16/2018   • Immobility syndrome 10/16/2018   • HTN (hypertension) 10/17/2018   • Generalized abdominal pain 04/15/2019   • Other constipation 04/15/2019   • Diarrhea 04/15/2019   • Hx of adenomatous colonic polyps 04/15/2019     Resolved Ambulatory Problems     Diagnosis Date Noted   • Fall 12/17/2017     Past Medical History:   Diagnosis Date   •  Arthritis    • Colon cancer (CMS/HCC) 2014   • Colon polyps 08/06/2014   • Depression    • Gout    • Hearing loss    • Hyperlipemia    • Hypertension    • Hypothyroidism    • Kidney stones    • Polio    • Rheumatoid arthritis (CMS/HCC)    • Sleep apnea        PAST SURGICAL HISTORY  Past Surgical History:   Procedure Laterality Date   • CHOLECYSTECTOMY WITH INTRAOPERATIVE CHOLANGIOGRAM N/A 11/10/2017    Procedure: CHOLECYSTECTOMY LAPAROSCOPIC INTRAOPERATIVE CHOLANGIOGRAM;  Surgeon: Elian Carnes MD;  Location: [facility] MAIN OR;  Service:    • COLECTOMY PARTIAL / TOTAL Right 09/08/2014    Open right colectomy-Dr. Elian Carnes   • COLONOSCOPY N/A 3/15/2017    Procedure: COLONOSCOPY TO ANASTAMOSIS AND TI WITH COLD SNARE POLYPECTOMY ;  Surgeon: Elian Carnes MD;  Location: [facility] ENDOSCOPY;  Service:    • COLONOSCOPY N/A 08/06/2014    1 cm cecal polyp, 1 cm transverse polyp, 1 cm sigmoid colon polyp, mass in the ascending colon occupying 1/3 of the colonic circumference, mass proximal to the transverse colon occupying 1/4 colonic circumference-Dr. Elian Carnes   • COLONOSCOPY N/A 5/1/2019    Procedure: COLONOSCOPY to ILEOCOLIC ANASTAMOSIS WITH HOT SNARE/COLD BX POLYPECTOMY;  Surgeon: Elian Carnes MD;  Location: [facility] ENDOSCOPY;  Service: General   • FOOT SURGERY Right 2008, 2009   • GASTRIC BANDING N/A 12/28/2009   • NOSE SURGERY N/A 1989   • ORIF TIBIA/FIBULA FRACTURES Left 12/17/2017    Procedure: TIBIA/FIBULA OPEN REDUCTION INTERNAL FIXATION;  Surgeon: Lai Acevedo Jr., MD;  Location: [facility] MAIN OR;  Service:    • ORIF TIBIA/FIBULA FRACTURES Right    • TOTAL KNEE ARTHROPLASTY Bilateral 1/2002, 6/2002 1/2002-left knee 6/2002-right knee-Dr. Jaun Sosa [facility]       FAMILY HISTORY  Family History   Problem Relation Age of Onset   • Lung cancer Mother    • Hypertension Father    • Diabetes Sister    • Hypertension Brother    • Hypertension Brother    • Lung cancer Brother        SOCIAL  HISTORY  Social History     Socioeconomic History   • Marital status: Single   Tobacco Use   • Smoking status: Never   • Smokeless tobacco: Never   Vaping Use   • Vaping Use: Never used   Substance and Sexual Activity   • Alcohol use: No   • Drug use: No   • Sexual activity: Defer       ALLERGIES  Keflex [cephalexin]    REVIEW OF SYSTEMS  All systems reviewed and negative except for those discussed in HPI.     PHYSICAL EXAM  ED Triage Vitals [03/24/23 1441]   Temp Heart Rate Resp BP SpO2   98.5 °F (36.9 °C) 84 16 143/76 97 %      Temp src Heart Rate Source Patient Position BP Location FiO2 (%)   Tympanic -- Sitting Right arm --       Physical Exam  Vitals and nursing note reviewed.   Constitutional:       Appearance: Normal appearance. He is obese.   HENT:      Head: Normocephalic and atraumatic.      Mouth/Throat:      Mouth: Mucous membranes are moist.   Eyes:      Extraocular Movements: Extraocular movements intact.      Pupils: Pupils are equal, round, and reactive to light.   Cardiovascular:      Rate and Rhythm: Normal rate and regular rhythm.   Pulmonary:      Effort: Pulmonary effort is normal.      Breath sounds: Normal breath sounds.   Abdominal:      General: There is no distension.      Palpations: Abdomen is soft.      Tenderness: There is no abdominal tenderness.   Musculoskeletal:      Cervical back: Normal range of motion and neck supple.   Neurological:      Mental Status: He is alert and oriented to person, place, and time. Mental status is at baseline.   Psychiatric:         Mood and Affect: Mood normal.         Behavior: Behavior normal.         Thought Content: Thought content normal.         Judgment: Judgment normal.       LAB RESULTS  Recent Results (from the past 24 hour(s))   Comprehensive Metabolic Panel    Collection Time: 03/24/23  3:15 PM    Specimen: Blood   Result Value Ref Range    Glucose 112 (H) 65 - 99 mg/dL    BUN 18 8 - 23 mg/dL    Creatinine 1.19 0.76 - 1.27 mg/dL    Sodium 144  136 - 145 mmol/L    Potassium 3.6 3.5 - 5.2 mmol/L    Chloride 102 98 - 107 mmol/L    CO2 31.9 (H) 22.0 - 29.0 mmol/L    Calcium 10.0 8.6 - 10.5 mg/dL    Total Protein 7.7 6.0 - 8.5 g/dL    Albumin 4.0 3.5 - 5.2 g/dL    ALT (SGPT) 28 1 - 41 U/L    AST (SGOT) 29 1 - 40 U/L    Alkaline Phosphatase 45 39 - 117 U/L    Total Bilirubin 1.0 0.0 - 1.2 mg/dL    Globulin 3.7 gm/dL    A/G Ratio 1.1 g/dL    BUN/Creatinine Ratio 15.1 7.0 - 25.0    Anion Gap 10.1 5.0 - 15.0 mmol/L    eGFR 64.5 >60.0 mL/min/1.73   TSH    Collection Time: 03/24/23  3:15 PM    Specimen: Blood   Result Value Ref Range    TSH 65.400 (H) 0.270 - 4.200 uIU/mL   T4, Free    Collection Time: 03/24/23  3:15 PM    Specimen: Blood   Result Value Ref Range    Free T4 0.40 (L) 0.93 - 1.70 ng/dL   CBC Auto Differential    Collection Time: 03/24/23  3:15 PM    Specimen: Blood   Result Value Ref Range    WBC 4.71 3.40 - 10.80 10*3/mm3    RBC 4.48 4.14 - 5.80 10*6/mm3    Hemoglobin 13.3 13.0 - 17.7 g/dL    Hematocrit 39.8 37.5 - 51.0 %    MCV 88.8 79.0 - 97.0 fL    MCH 29.7 26.6 - 33.0 pg    MCHC 33.4 31.5 - 35.7 g/dL    RDW 13.1 12.3 - 15.4 %    RDW-SD 42.9 37.0 - 54.0 fl    MPV 9.9 6.0 - 12.0 fL    Platelets 131 (L) 140 - 450 10*3/mm3    Neutrophil % 66.4 42.7 - 76.0 %    Lymphocyte % 21.0 19.6 - 45.3 %    Monocyte % 10.4 5.0 - 12.0 %    Eosinophil % 0.8 0.3 - 6.2 %    Basophil % 0.6 0.0 - 1.5 %    Neutrophils, Absolute 3.12 1.70 - 7.00 10*3/mm3    Lymphocytes, Absolute 0.99 0.70 - 3.10 10*3/mm3    Monocytes, Absolute 0.49 0.10 - 0.90 10*3/mm3    Eosinophils, Absolute 0.04 0.00 - 0.40 10*3/mm3    Basophils, Absolute 0.03 0.00 - 0.20 10*3/mm3   Single High Sensitivity Troponin T    Collection Time: 03/24/23  3:15 PM    Specimen: Blood   Result Value Ref Range    HS Troponin T 47 (H) <15 ng/L   Urinalysis With Microscopic If Indicated (No Culture) - Urine, Clean Catch    Collection Time: 03/24/23  3:39 PM    Specimen: Urine, Clean Catch   Result Value Ref Range     Color, UA Yellow Yellow, Straw    Appearance, UA Cloudy (A) Clear    pH, UA 5.5 5.0 - 8.0    Specific Gravity, UA 1.022 1.005 - 1.030    Glucose, UA Negative Negative    Ketones, UA Trace (A) Negative    Bilirubin, UA Negative Negative    Blood, UA Large (3+) (A) Negative    Protein, UA Trace (A) Negative    Leuk Esterase, UA Negative Negative    Nitrite, UA Negative Negative    Urobilinogen, UA 1.0 E.U./dL 0.2 - 1.0 E.U./dL   Urinalysis, Microscopic Only - Urine, Clean Catch    Collection Time: 03/24/23  3:39 PM    Specimen: Urine, Clean Catch   Result Value Ref Range    RBC, UA 13-20 (A) None Seen, 0-2 /HPF    WBC, UA 0-2 None Seen, 0-2 /HPF    Bacteria, UA None Seen None Seen /HPF    Squamous Epithelial Cells, UA 0-2 None Seen, 0-2 /HPF    Hyaline Casts, UA 0-2 None Seen /LPF    Methodology Automated Microscopy    ECG 12 Lead Other; generalized weakness    Collection Time: 03/24/23  3:46 PM   Result Value Ref Range    QT Interval 562 ms   Single High Sensitivity Troponin T    Collection Time: 03/24/23  5:29 PM    Specimen: Blood   Result Value Ref Range    HS Troponin T 69 (C) <15 ng/L   ECG 12 Lead Other; generalized weakness    Collection Time: 03/24/23  6:46 PM   Result Value Ref Range    QT Interval 550 ms     I ordered the above labs and reviewed the results.    RADIOLOGY  XR Chest 1 View    Result Date: 3/24/2023  XR CHEST 1 VW-  HISTORY: Male who is 73 years-old,  weakness  TECHNIQUE: Frontal view of the chest  COMPARISON: 12/17/2017  FINDINGS: The heart size is borderline. Aorta is calcified. Pulmonary vasculature is unremarkable. No focal pulmonary consolidation, pleural effusion, or pneumothorax. No acute osseous process.      No focal pulmonary consolidation. Borderline heart size. Follow-up as clinical indications persist.  This report was finalized on 3/24/2023 3:25 PM by Dr. Eric Castellano M.D.      I ordered the above noted radiological studies. Reviewed by me in PACS.       PROCEDURES  Procedures  None    MEDICATIONS GIVEN IN ER  Medications - No data to display    MEDICAL DECISION MAKING, PROGRESS, and CONSULTS  Vital signs and nursing notes have all been reviewed by me.    All laboratory results have been independently interpreted by me.      Orders placed during this visit:  Orders Placed This Encounter   Procedures   • XR Chest 1 View   • Comprehensive Metabolic Panel   • Urinalysis With Microscopic If Indicated (No Culture) - Urine, Clean Catch   • TSH   • T4, Free   • CBC Auto Differential   • Single High Sensitivity Troponin T   • Urinalysis, Microscopic Only - Urine, Clean Catch   • Single High Sensitivity Troponin T   • Monitor Blood Pressure   • Cardiac Monitoring   • Pulse Oximetry, Continuous   • LIPPS (on-call MD unless specified)   • ECG 12 Lead Other; generalized weakness   • ECG 12 Lead Other; generalized weakness   • Inpatient Admission   • CBC & Differential       Differential diagnosis includes but is not limited to:  -LAYTON  -hypothyroidism  -cardiac abnormality  -viral or bacterial infection    Independent interpretation of labs, radiology studies, and discussions with consultants: Dr. Solorzano with LIPPS    Discussion below represents my analysis of pertinent findings related to patient's condition, differential diagnosis, treatment plan and final disposition:    The patient presents with generalized weakness and decreased p.o. intake.  He lives at home by himself, is bedbound, and no longer has anyone to care for him.  I do not believe that it is reasonable to discharge him home, even if he had a normal ED work-up.  The patient's TSH is significantly elevated.  In addition, his troponin is elevated.  He denies chest pain.  He will need to be admitted to the hospital for further evaluation and management, and also potentially for placement.    ED Course as of 03/24/23 1928   Fri Mar 24, 2023   1625 Chest x-ray personally interpreted by me.  My personal  interpretation is: Heart size is borderline.  Lungs are clear.  No pneumothorax [WH]   1625 EKG personally interpreted by me.  My personal interpretation is:         EKG time: 1546  Rhythm/Rate: Sinus rhythm, rate 60  P waves and CA: First-degree AV block  QRS, axis: Normal axis, low voltage  ST and T waves:, Nonspecific ST/T wave changes in multiple leads    Interpreted Contemporaneously by me at 1551, independently viewed  EKG is not significantly changed compared to prior EKG done on 11/6/2017   [WH]   1626 HS Troponin T(!): 47 [WH]   1626 TSH Baseline(!): 65.400 [WH]   1626 Free T4(!): 0.40 [WH]   1626 BUN: 18 [WH]   1626 Creatinine: 1.19 [WH]   1836 HS Troponin T(!!): 69  Delta is +22 [WH]   1844 I discussed the patient's care with Dr. Solorzano with LIPPS.  He is agreeable to admission. [AR]   1902 Repeat EKG.  EKG personally interpreted by me.  My personal interpretation is:          EKG time: 1846  Rhythm/Rate: Sinus bradycardia, rate 59  P waves and CA: First-degree AV block  QRS, axis: Normal axis, low voltage  ST and T waves: Nonspecific ST/T wave changes     Interpreted Contemporaneously by me at 1849, independently viewed  EKG is not significantly changed compared to prior EKG done today at 1546   [WH]      ED Course User Index  [AR] Liz Logan PA  [WH] Jaun Bradley MD            Additional sources:  - Chronic or social conditions impacting care: Patient is bedbound and lives independently and states that he has no one to help take care of him    - Shared decision making: I discussed ED evaluation and treatment plan with patient who is in agreement.  Discussed at length ED testing.     PPE: The patient was placed in a face mask in first look. Patient was wearing facemask when I entered the room and throughout our encounter. I wore full protective equipment throughout this patient encounter including a face mask, eye protection and gloves. Hand hygiene was performed before donning  protective equipment and after removal when leaving the room.    DIAGNOSIS  Final diagnoses:   Generalized weakness   Elevated TSH   Elevated troponin       DISPOSITION  ED Disposition     ED Disposition   Decision to Admit    Condition   --    Comment   Level of Care: Telemetry [5]   Diagnosis: Generalized weakness [479786]   Admitting Physician: CHRISTINA VAUGHN [[id number]]   Attending Physician: CHRISTINA VAUGHN [[id number]]   Certification: I Certify That Inpatient Hospital Services Are Medically Necessary For Greater Than 2 Midnights             RX  Medications - No data to display       Medication List      No changes were made to your prescriptions during this visit.         Latest Documented Vital Signs:  As of 19:28 EDT  BP- 145/69 HR- 64 Temp- 98.5 °F (36.9 °C) (Tympanic) O2 sat- 93%    Provider Attestation:  I personally reviewed the past medical history, past surgical history, social history, family history, current medications and allergies as they appear in the chart. I reviewed the patient's history, physical, lab/imaging results and overall care with Dr. Bradley who is in agreement with the patient's treatment plan.    EMR Dragon/Transcription disclaimer:  Dictated using Dragon dictation    Provider note signed by:    Note Disclaimer: At [facility], we believe that sharing information builds trust and better relationships. You are receiving this note because you are receiving care at [facility] or recently visited. It is possible you will see health information before a provider has talked with you about it. This kind of information can be easy to misunderstand. To help you fully understand what it means for your health, we urge you to discuss this note with your provider.       Liz Logan PA  03/24/23 1930      Electronically signed by Jaun Bradley MD at 03/24/23 2117     Jaun Bradley MD at 03/24/23 1624          MD ATTESTATION NOTE    The PRINCESS and I have discussed this patient's  history, physical exam, and treatment plan.  I have reviewed the documentation and personally had a face to face interaction with the patient. I affirm the documentation and agree with the treatment and plan.  The attached note describes my personal findings.      I provided a substantive portion of the care of the patient.  I personally performed the physical exam in its entirety, and below are my findings.  For this patient encounter, the patient wore surgical mask, I wore full protective PPE including N95 and eye protection.      Brief HPI: Patient complains of generalized weakness and decreased p.o. intake.  Patient lives alone and is bedbound.  A friend has been helping take care of him but reportedly can no longer assist the patient.  Patient does not have any family that can help him.  Denies cough, fever, chest pain, shortness of breath, vomiting, or diarrhea.    PHYSICAL EXAM  ED Triage Vitals [03/24/23 1441]   Temp Heart Rate Resp BP SpO2   98.5 °F (36.9 °C) 84 16 143/76 97 %      Temp src Heart Rate Source Patient Position BP Location FiO2 (%)   Tympanic -- Sitting Right arm --         GENERAL: Awake, alert, oriented x3.  Well-developed obese male.  No acute distress  HENT: nares patent, moist mucous membranes  EYES: no scleral icterus  CV: regular rhythm, normal rate  RESPIRATORY: normal effort, clear to auscultation bilateral  ABDOMEN: soft, obese, nontender  MUSCULOSKELETAL: no deformity  NEURO: alert, moves all extremities, follows commands  PSYCH:  calm, cooperative  SKIN: warm, dry    Vital signs and nursing notes reviewed.        Plan: Obtain labs, EKG, and chest x-ray.    ED Course as of 03/24/23 1903   Fri Mar 24, 2023   1625 Chest x-ray personally interpreted by me.  My personal interpretation is: Heart size is borderline.  Lungs are clear.  No pneumothorax [WH]   1625 EKG personally interpreted by me.  My personal interpretation is:         EKG time: 1546  Rhythm/Rate: Sinus rhythm, rate 60  P  waves and OH: First-degree AV block  QRS, axis: Normal axis, low voltage  ST and T waves:, Nonspecific ST/T wave changes in multiple leads    Interpreted Contemporaneously by me at 1551, independently viewed  EKG is not significantly changed compared to prior EKG done on 11/6/2017   [WH]   1626 HS Troponin T(!): 47 [WH]   1626 TSH Baseline(!): 65.400 [WH]   1626 Free T4(!): 0.40 [WH]   1626 BUN: 18 [WH]   1626 Creatinine: 1.19 [WH]   1836 HS Troponin T(!!): 69  Delta is +22 [WH]   1844 I discussed the patient's care with Dr. Solorzano with LIPPS.  He is agreeable to admission. [AR]   1902 Repeat EKG.  EKG personally interpreted by me.  My personal interpretation is:          EKG time: 1846  Rhythm/Rate: Sinus bradycardia, rate 59  P waves and OH: First-degree AV block  QRS, axis: Normal axis, low voltage  ST and T waves: Nonspecific ST/T wave changes     Interpreted Contemporaneously by me at 1849, independently viewed  EKG is not significantly changed compared to prior EKG done today at 1546   [WH]      ED Course User Index  [AR] Liz Logan PA  [] Jaun Bradley MD Holland, William D, MD  03/24/23 1903      Electronically signed by Juan Bradley MD at 03/24/23 1903     Hong So RN at 03/24/23 1857          .Nursing report ED to floor  Nicanor Renteria  73 y.o.  male    HPI :   Chief Complaint   Patient presents with   • Weakness - Generalized       Admitting doctor:   Gregg Solorzano MD    Admitting diagnosis:   The primary encounter diagnosis was Generalized weakness. Diagnoses of Elevated TSH and Elevated troponin were also pertinent to this visit.    Code status:   Current Code Status       Date Active Code Status Order ID Comments User Context       Prior            Allergies:   Keflex [cephalexin]    Isolation:   No active isolations    Intake and Output  No intake or output data in the 24 hours ending 03/24/23 1859    Weight:   There were no vitals filed for this visit.    Most  "recent vitals:   Vitals:    03/24/23 1441 03/24/23 1443 03/24/23 1658   BP: 143/76  144/77   BP Location: Right arm     Patient Position: Sitting     Pulse: 84  64   Resp: 16     Temp: 98.5 °F (36.9 °C)     TempSrc: Tympanic     SpO2: 97%  93%   Height:  177.8 cm (70\")        Active LDAs/IV Access:   Lines, Drains & Airways       Active LDAs       Name Placement date Placement time Site Days    Peripheral IV 03/24/23 1518 Right Antecubital 03/24/23  1518  Antecubital  less than 1                    Labs (abnormal labs have a star):   Labs Reviewed   COMPREHENSIVE METABOLIC PANEL - Abnormal; Notable for the following components:       Result Value    Glucose 112 (*)     CO2 31.9 (*)     All other components within normal limits    Narrative:     GFR Normal >60  Chronic Kidney Disease <60  Kidney Failure <15    The GFR formula is only valid for adults with stable renal function between ages 18 and 70.   URINALYSIS W/ MICROSCOPIC IF INDICATED (NO CULTURE) - Abnormal; Notable for the following components:    Appearance, UA Cloudy (*)     Ketones, UA Trace (*)     Blood, UA Large (3+) (*)     Protein, UA Trace (*)     All other components within normal limits   TSH - Abnormal; Notable for the following components:    TSH 65.400 (*)     All other components within normal limits   T4, FREE - Abnormal; Notable for the following components:    Free T4 0.40 (*)     All other components within normal limits    Narrative:     Results may be falsely increased if patient taking Biotin.     CBC WITH AUTO DIFFERENTIAL - Abnormal; Notable for the following components:    Platelets 131 (*)     All other components within normal limits   SINGLE HSTROPONIN T - Abnormal; Notable for the following components:    HS Troponin T 47 (*)     All other components within normal limits    Narrative:     High Sensitive Troponin T Reference Range:  <10.0 ng/L- Negative Female for AMI  <15.0 ng/L- Negative Male for AMI  >=10 - Abnormal Female " indicating possible myocardial injury.  >=15 - Abnormal Male indicating possible myocardial injury.   Clinicians would have to utilize clinical acumen, EKG, Troponin, and serial changes to determine if it is an Acute Myocardial Infarction or myocardial injury due to an underlying chronic condition.        URINALYSIS, MICROSCOPIC ONLY - Abnormal; Notable for the following components:    RBC, UA 13-20 (*)     All other components within normal limits   SINGLE HSTROPONIN T - Abnormal; Notable for the following components:    HS Troponin T 69 (*)     All other components within normal limits    Narrative:     High Sensitive Troponin T Reference Range:  <10.0 ng/L- Negative Female for AMI  <15.0 ng/L- Negative Male for AMI  >=10 - Abnormal Female indicating possible myocardial injury.  >=15 - Abnormal Male indicating possible myocardial injury.   Clinicians would have to utilize clinical acumen, EKG, Troponin, and serial changes to determine if it is an Acute Myocardial Infarction or myocardial injury due to an underlying chronic condition.        CBC AND DIFFERENTIAL    Narrative:     The following orders were created for panel order CBC & Differential.  Procedure                               Abnormality         Status                     ---------                               -----------         ------                     CBC Auto Differential[[id number]]        Abnormal            Final result                 Please view results for these tests on the individual orders.       EKG:   ECG 12 Lead Other; generalized weakness   Preliminary Result   HEART RATE= 59  bpm   RR Interval= 1017  ms   MN Interval= 211  ms   P Horizontal Axis= 17  deg   P Front Axis= 40  deg   QRSD Interval= 92  ms   QT Interval= 550  ms   QRS Axis= 58  deg   T Wave Axis= 41  deg   - ABNORMAL ECG -   Sinus rhythm   Low voltage, precordial leads   Nonspecific T abnrm, anterolateral leads   Prolonged QT interval   Electronically Signed By:    Date  and Time of Study: 2023-03-24 18:46:13      ECG 12 Lead Other; generalized weakness   Final Result   HEART RATE= 60  bpm   RR Interval= 1000  ms   WA Interval= 211  ms   P Horizontal Axis= 23  deg   P Front Axis= 32  deg   QRSD Interval= 70  ms   QT Interval= 562  ms   QRS Axis= 33  deg   T Wave Axis= 87  deg   - ABNORMAL ECG -   Sinus rhythm   Low voltage, precordial leads   Nonspecific T abnrm, anterolateral leads - new   Prolonged QT interval   Electronically Signed By: Alanna Yusuf ([facility] 24-Mar-2023 16:31:24   Date and Time of Study: 2023-03-24 15:46:08          Meds given in ED:   Medications - No data to display    Imaging results:  XR Chest 1 View    Result Date: 3/24/2023  No focal pulmonary consolidation. Borderline heart size. Follow-up as clinical indications persist.  This report was finalized on 3/24/2023 3:25 PM by Dr. Eric Castellano M.D.       Ambulatory status:   -Max Assist    Social issues:   Social History     Socioeconomic History   • Marital status: Single   Tobacco Use   • Smoking status: Never   • Smokeless tobacco: Never   Vaping Use   • Vaping Use: Never used   Substance and Sexual Activity   • Alcohol use: No   • Drug use: No   • Sexual activity: Defer         Hong So RN  03/24/23 18:59 EDT         Electronically signed by Hong So RN at 03/24/23 1859       Orders (all)      Start     Ordered    03/25/23 0900  aspirin chewable tablet 81 mg  Daily         03/24/23 2320    03/25/23 0825  Stress Test With Myocardial Perfusion One Day  Once         03/25/23 0824    03/25/23 0825  Adult Transthoracic Echo Complete W/ Cont if Necessary Per Protocol  Once         03/25/23 0824    03/25/23 0813  High Sensitivity Troponin T 2Hr  PROCEDURE ONCE         03/25/23 0736    03/25/23 0714  Manual Differential  Once,   Status:  Canceled         03/25/23 0713    03/25/23 0600  levothyroxine (SYNTHROID, LEVOTHROID) tablet 100 mcg  Every Early Morning         03/24/23 2320    03/25/23 0600   pantoprazole (PROTONIX) EC tablet 40 mg  Every Early Morning         03/24/23 2320 03/25/23 0600  CBC & Differential  Morning Draw         03/24/23 2320 03/25/23 0600  Comprehensive Metabolic Panel  Morning Draw         03/24/23 2320 03/25/23 0600  CBC Auto Differential  PROCEDURE ONCE         03/24/23 2320 03/25/23 0600  High Sensitivity Troponin T  Morning Draw         03/24/23 2324    03/25/23 0015  sodium chloride 0.45 % infusion  Continuous         03/24/23 2320 03/24/23 2355  acetaminophen (TYLENOL) tablet 650 mg  Every 6 Hours PRN         03/24/23 2355    03/24/23 2324  Inpatient Cardiology Consult  Once        Specialty:  Cardiology  Provider:  Crissy Melendrez MD    03/24/23 2324 03/24/23 2322  PT Consult: Eval & Treat Functional Mobility Below Baseline  Once         03/24/23 2321 03/24/23 2322  OT Consult: Eval & Treat  Once         03/24/23 2321 03/24/23 2322  Place Sequential Compression Device  Once         03/24/23 2321 03/24/23 2322  Maintain Sequential Compression Device  Continuous         03/24/23 2321 03/24/23 2321  Diet: Regular/House Diet; Texture: Regular Texture (IDDSI 7); Fluid Consistency: Thin (IDDSI 0)  Diet Effective Now         03/24/23 2320 03/24/23 2111  Influenza Vac High-Dose Quad (FLUZONE HIGH DOSE) injection 0.7 mL  During Hospitalization         03/24/23 2111 03/24/23 2111  Inpatient Case Management  Consult  Once        Provider:  (Not yet assigned)    03/24/23 2111    03/24/23 1846  Inpatient Admission  Once         03/24/23 1846    03/24/23 1838  ECG 12 Lead Other; generalized weakness  Once         03/24/23 1837    03/24/23 1836  LIPPS (on-call MD unless specified)  Once        Specialty:  Internal Medicine  Provider:  (Not yet assigned)    03/24/23 1835    03/24/23 1715  Single High Sensitivity Troponin T  Timed         03/24/23 1700    03/24/23 1606  Urinalysis, Microscopic Only - Urine, Clean Catch  Once          03/24/23 1605    03/24/23 1601  ECG 12 Lead Chest Pain  Once,   Status:  Canceled         03/24/23 1600    03/24/23 1601  Single High Sensitivity Troponin T  Once         03/24/23 1600    03/24/23 1527  Manual Differential  Once,   Status:  Canceled         03/24/23 1526    03/24/23 1509  TSH  Once         03/24/23 1508    03/24/23 1509  T4, Free  Once         03/24/23 1508    03/24/23 1509  Monitor Blood Pressure  Per Hospital Policy         03/24/23 1508    03/24/23 1509  Cardiac Monitoring  Continuous        Comments: Follow Standing Orders As Outlined in Process Instructions (Open Order Report to View Full Instructions)    03/24/23 1508    03/24/23 1509  Pulse Oximetry, Continuous  Continuous         03/24/23 1508    03/24/23 1508  Urinalysis With Microscopic If Indicated (No Culture) - Urine, Clean Catch  Once         03/24/23 1508    03/24/23 1508  ECG 12 Lead Other; generalized weakness  Once         03/24/23 1508    03/24/23 1508  XR Chest 1 View  1 Time Imaging         03/24/23 1508    03/24/23 1507  CBC & Differential  Once         03/24/23 1508    03/24/23 1507  Comprehensive Metabolic Panel  Once         03/24/23 1508    03/24/23 1507  CBC Auto Differential  PROCEDURE ONCE         03/24/23 1508

## 2023-03-26 ENCOUNTER — APPOINTMENT (OUTPATIENT)
Dept: NUCLEAR MEDICINE | Facility: HOSPITAL | Age: 73
End: 2023-03-26
Payer: MEDICARE

## 2023-03-26 LAB
ALBUMIN SERPL-MCNC: 3.3 G/DL (ref 3.5–5.2)
ALBUMIN/GLOB SERPL: 1.1 G/DL
ALP SERPL-CCNC: 39 U/L (ref 39–117)
ALT SERPL W P-5'-P-CCNC: 22 U/L (ref 1–41)
ANION GAP SERPL CALCULATED.3IONS-SCNC: 12 MMOL/L (ref 5–15)
AST SERPL-CCNC: 24 U/L (ref 1–40)
BASOPHILS # BLD AUTO: 0.03 10*3/MM3 (ref 0–0.2)
BASOPHILS NFR BLD AUTO: 0.6 % (ref 0–1.5)
BH CV REST NUCLEAR ISOTOPE DOSE: 10.7 MCI
BH CV STRESS COMMENTS STAGE 1: NORMAL
BH CV STRESS DOSE REGADENOSON STAGE 1: 0.4
BH CV STRESS DURATION MIN STAGE 1: 0
BH CV STRESS DURATION SEC STAGE 1: 10
BH CV STRESS NUCLEAR ISOTOPE DOSE: 30 MCI
BH CV STRESS PROTOCOL 1: NORMAL
BH CV STRESS RECOVERY BP: NORMAL MMHG
BH CV STRESS RECOVERY HR: 71 BPM
BH CV STRESS STAGE 1: 1
BILIRUB SERPL-MCNC: 0.8 MG/DL (ref 0–1.2)
BUN SERPL-MCNC: 18 MG/DL (ref 8–23)
BUN/CREAT SERPL: 20.5 (ref 7–25)
CALCIUM SPEC-SCNC: 8.8 MG/DL (ref 8.6–10.5)
CHLORIDE SERPL-SCNC: 105 MMOL/L (ref 98–107)
CHOLEST SERPL-MCNC: 148 MG/DL (ref 0–200)
CO2 SERPL-SCNC: 24 MMOL/L (ref 22–29)
CREAT SERPL-MCNC: 0.88 MG/DL (ref 0.76–1.27)
DEPRECATED RDW RBC AUTO: 43.6 FL (ref 37–54)
EGFRCR SERPLBLD CKD-EPI 2021: 90.8 ML/MIN/1.73
EOSINOPHIL # BLD AUTO: 0.05 10*3/MM3 (ref 0–0.4)
EOSINOPHIL NFR BLD AUTO: 1.1 % (ref 0.3–6.2)
ERYTHROCYTE [DISTWIDTH] IN BLOOD BY AUTOMATED COUNT: 13.5 % (ref 12.3–15.4)
GLOBULIN UR ELPH-MCNC: 2.9 GM/DL
GLUCOSE SERPL-MCNC: 92 MG/DL (ref 65–99)
HBA1C MFR BLD: 5.3 % (ref 4.8–5.6)
HCT VFR BLD AUTO: 35 % (ref 37.5–51)
HDLC SERPL-MCNC: 47 MG/DL (ref 40–60)
HGB BLD-MCNC: 11.5 G/DL (ref 13–17.7)
LDLC SERPL CALC-MCNC: 81 MG/DL (ref 0–100)
LDLC/HDLC SERPL: 1.67 {RATIO}
LV EF NUC BP: 70 %
LYMPHOCYTES # BLD AUTO: 1.6 10*3/MM3 (ref 0.7–3.1)
LYMPHOCYTES NFR BLD AUTO: 33.9 % (ref 19.6–45.3)
MAXIMAL PREDICTED HEART RATE: 147 BPM
MCH RBC QN AUTO: 29.3 PG (ref 26.6–33)
MCHC RBC AUTO-ENTMCNC: 32.9 G/DL (ref 31.5–35.7)
MCV RBC AUTO: 89.1 FL (ref 79–97)
MONOCYTES # BLD AUTO: 0.51 10*3/MM3 (ref 0.1–0.9)
MONOCYTES NFR BLD AUTO: 10.8 % (ref 5–12)
NEUTROPHILS NFR BLD AUTO: 2.52 10*3/MM3 (ref 1.7–7)
NEUTROPHILS NFR BLD AUTO: 53.4 % (ref 42.7–76)
PERCENT MAX PREDICTED HR: 59.86 %
PLATELET # BLD AUTO: 116 10*3/MM3 (ref 140–450)
PMV BLD AUTO: 10.5 FL (ref 6–12)
POTASSIUM SERPL-SCNC: 3.4 MMOL/L (ref 3.5–5.2)
PROT SERPL-MCNC: 6.2 G/DL (ref 6–8.5)
RBC # BLD AUTO: 3.93 10*6/MM3 (ref 4.14–5.8)
SODIUM SERPL-SCNC: 141 MMOL/L (ref 136–145)
STRESS BASELINE BP: NORMAL MMHG
STRESS BASELINE HR: 61 BPM
STRESS PERCENT HR: 70 %
STRESS POST PEAK BP: NORMAL MMHG
STRESS POST PEAK HR: 88 BPM
STRESS TARGET HR: 125 BPM
TRIGL SERPL-MCNC: 112 MG/DL (ref 0–150)
TROPONIN T SERPL HS-MCNC: 53 NG/L
TSH SERPL DL<=0.05 MIU/L-ACNC: 66.3 UIU/ML (ref 0.27–4.2)
VLDLC SERPL-MCNC: 20 MG/DL (ref 5–40)
WBC NRBC COR # BLD: 4.72 10*3/MM3 (ref 3.4–10.8)

## 2023-03-26 PROCEDURE — 96376 TX/PRO/DX INJ SAME DRUG ADON: CPT

## 2023-03-26 PROCEDURE — 78452 HT MUSCLE IMAGE SPECT MULT: CPT | Performed by: INTERNAL MEDICINE

## 2023-03-26 PROCEDURE — 78452 HT MUSCLE IMAGE SPECT MULT: CPT

## 2023-03-26 PROCEDURE — 90791 PSYCH DIAGNOSTIC EVALUATION: CPT

## 2023-03-26 PROCEDURE — 80053 COMPREHEN METABOLIC PANEL: CPT | Performed by: HOSPITALIST

## 2023-03-26 PROCEDURE — 84484 ASSAY OF TROPONIN QUANT: CPT | Performed by: HOSPITALIST

## 2023-03-26 PROCEDURE — 93017 CV STRESS TEST TRACING ONLY: CPT

## 2023-03-26 PROCEDURE — 25010000002 REGADENOSON 0.4 MG/5ML SOLUTION: Performed by: HOSPITALIST

## 2023-03-26 PROCEDURE — 83036 HEMOGLOBIN GLYCOSYLATED A1C: CPT | Performed by: HOSPITALIST

## 2023-03-26 PROCEDURE — 85025 COMPLETE CBC W/AUTO DIFF WBC: CPT | Performed by: HOSPITALIST

## 2023-03-26 PROCEDURE — 99232 SBSQ HOSP IP/OBS MODERATE 35: CPT | Performed by: INTERNAL MEDICINE

## 2023-03-26 PROCEDURE — 0 TECHNETIUM SESTAMIBI: Performed by: HOSPITALIST

## 2023-03-26 PROCEDURE — 25010000002 SODIUM CHLORIDE 0.9 % WITH KCL 20 MEQ 20-0.9 MEQ/L-% SOLUTION: Performed by: HOSPITALIST

## 2023-03-26 PROCEDURE — A9500 TC99M SESTAMIBI: HCPCS | Performed by: HOSPITALIST

## 2023-03-26 PROCEDURE — 84443 ASSAY THYROID STIM HORMONE: CPT | Performed by: HOSPITALIST

## 2023-03-26 PROCEDURE — 93018 CV STRESS TEST I&R ONLY: CPT | Performed by: INTERNAL MEDICINE

## 2023-03-26 PROCEDURE — 96361 HYDRATE IV INFUSION ADD-ON: CPT

## 2023-03-26 PROCEDURE — 80061 LIPID PANEL: CPT | Performed by: HOSPITALIST

## 2023-03-26 PROCEDURE — 25010000002 MORPHINE PER 10 MG: Performed by: INTERNAL MEDICINE

## 2023-03-26 RX ADMIN — MORPHINE SULFATE 2 MG: 2 INJECTION, SOLUTION INTRAMUSCULAR; INTRAVENOUS at 02:30

## 2023-03-26 RX ADMIN — TECHNETIUM TC 99M SESTAMIBI 1 DOSE: 1 INJECTION INTRAVENOUS at 10:20

## 2023-03-26 RX ADMIN — ISOSORBIDE MONONITRATE 30 MG: 30 TABLET, EXTENDED RELEASE ORAL at 13:15

## 2023-03-26 RX ADMIN — PANTOPRAZOLE SODIUM 40 MG: 40 TABLET, DELAYED RELEASE ORAL at 18:48

## 2023-03-26 RX ADMIN — LEVOTHYROXINE SODIUM 150 MCG: 0.15 TABLET ORAL at 05:08

## 2023-03-26 RX ADMIN — MORPHINE SULFATE 2 MG: 2 INJECTION, SOLUTION INTRAMUSCULAR; INTRAVENOUS at 19:03

## 2023-03-26 RX ADMIN — METOPROLOL TARTRATE 12.5 MG: 25 TABLET, FILM COATED ORAL at 13:15

## 2023-03-26 RX ADMIN — TECHNETIUM TC 99M SESTAMIBI 1 DOSE: 1 INJECTION INTRAVENOUS at 06:40

## 2023-03-26 RX ADMIN — ATORVASTATIN CALCIUM 10 MG: 20 TABLET, FILM COATED ORAL at 20:03

## 2023-03-26 RX ADMIN — METOPROLOL TARTRATE 12.5 MG: 25 TABLET, FILM COATED ORAL at 20:03

## 2023-03-26 RX ADMIN — POTASSIUM CHLORIDE AND SODIUM CHLORIDE 75 ML/HR: 900; 150 INJECTION, SOLUTION INTRAVENOUS at 18:48

## 2023-03-26 RX ADMIN — AMLODIPINE BESYLATE 10 MG: 5 TABLET ORAL at 13:15

## 2023-03-26 RX ADMIN — TRAZODONE HYDROCHLORIDE 150 MG: 100 TABLET ORAL at 20:03

## 2023-03-26 RX ADMIN — SERTRALINE 100 MG: 100 TABLET, FILM COATED ORAL at 13:15

## 2023-03-26 RX ADMIN — REGADENOSON 0.4 MG: 0.08 INJECTION, SOLUTION INTRAVENOUS at 10:20

## 2023-03-26 RX ADMIN — ASPIRIN 81 MG: 81 TABLET, CHEWABLE ORAL at 13:15

## 2023-03-26 RX ADMIN — PANTOPRAZOLE SODIUM 40 MG: 40 TABLET, DELAYED RELEASE ORAL at 05:08

## 2023-03-26 RX ADMIN — DULOXETINE HYDROCHLORIDE 20 MG: 20 CAPSULE, DELAYED RELEASE ORAL at 13:15

## 2023-03-26 RX ADMIN — POTASSIUM CHLORIDE AND SODIUM CHLORIDE 75 ML/HR: 900; 150 INJECTION, SOLUTION INTRAVENOUS at 01:15

## 2023-03-26 NOTE — PLAN OF CARE
Goal Outcome Evaluation:           Progress: improving  Outcome Evaluation: A&Ox4. RA. Multiple BMs this shift. NPO for stress test this AM, test resulted negative. Regular, cardiac diet resumed. ECHO needing to be completed. IVFs with 20K+ infusing, potassium increased from yesterday. Pt states struggles with depression and requesting access consult. No other complaints this shift. VSS.

## 2023-03-26 NOTE — PROGRESS NOTES
[facility]  Cardiology Progress Note    Patient Identification:  Name: Nicanor Haley  Age: 73 y.o.  Sex: male  :  1950  MRN: [id number]                 Follow Up / Chief Complaint: Chest pain    Interval History:  Continues to complains of chest     Subjective:      Objective:    Past Medical History:  Past Medical History:   Diagnosis Date   • Arthritis    • Colon cancer (HCC)     tubulovillous adenomas with adenocarcinoma in situ s/p right open colectomy   • Colon polyps 2014    1) cecal polyps: fragments of adenomatous polyp with low grade-dysplasia 2) ascending colon mass bx: fragments of tubulovillous adenoma w/ low grade dysplasia 3) transverse colon polyp: fragments of adenomatous polyp with low grade dysplasia 4) sigmoid polyp: fragments of adenomatous polyp with low grade dysplasia   • Depression    • Gout    • Hearing loss    • Hyperlipemia    • Hypertension    • Hypothyroidism    • Kidney stones    • Polio    • Rheumatoid arthritis (HCC)    • Sleep apnea      Past Surgical History:  Past Surgical History:   Procedure Laterality Date   • CHOLECYSTECTOMY WITH INTRAOPERATIVE CHOLANGIOGRAM N/A 11/10/2017    Procedure: CHOLECYSTECTOMY LAPAROSCOPIC INTRAOPERATIVE CHOLANGIOGRAM;  Surgeon: Elian Carnes MD;  Location: [facility] OR;  Service:    • COLECTOMY PARTIAL / TOTAL Right 2014    Open right colectomy-Dr. Elian Carnes   • COLONOSCOPY N/A 3/15/2017    Procedure: COLONOSCOPY TO ANASTAMOSIS AND TI WITH COLD SNARE POLYPECTOMY ;  Surgeon: Elian Carnes MD;  Location: [facility] ENDOSCOPY;  Service:    • COLONOSCOPY N/A 2014    1 cm cecal polyp, 1 cm transverse polyp, 1 cm sigmoid colon polyp, mass in the ascending colon occupying 1/3 of the colonic circumference, mass proximal to the transverse colon occupying 1/4 colonic circumference-Dr. Elian Carnes   • COLONOSCOPY N/A 2019    Procedure: COLONOSCOPY to ILEOCOLIC ANASTAMOSIS WITH HOT SNARE/COLD BX  POLYPECTOMY;  Surgeon: Elian Carnes MD;  Location: [facility] ENDOSCOPY;  Service: General   • FOOT SURGERY Right 2008, 2009   • GASTRIC BANDING N/A 12/28/2009   • NOSE SURGERY N/A 1989   • ORIF TIBIA/FIBULA FRACTURES Left 12/17/2017    Procedure: TIBIA/FIBULA OPEN REDUCTION INTERNAL FIXATION;  Surgeon: Lai Acevedo Jr., MD;  Location: [facility] MAIN OR;  Service:    • ORIF TIBIA/FIBULA FRACTURES Right    • TOTAL KNEE ARTHROPLASTY Bilateral 1/2002, 6/2002 1/2002-left knee 6[facility]        Social History:   Social History     Tobacco Use   • Smoking status: Never   • Smokeless tobacco: Never   Substance Use Topics   • Alcohol use: No      Family History:  Family History   Problem Relation Age of Onset   • Lung cancer Mother    • Hypertension Father    • Diabetes Sister    • Hypertension Brother    • Hypertension Brother    • Lung cancer Brother           Allergies:  Allergies   Allergen Reactions   • Keflex [Cephalexin] Mental Status Change     Pt reports that it makes him violent (12/17/17)      Scheduled Meds:  amLODIPine, 10 mg, Daily  aspirin, 81 mg, Daily  atorvastatin, 10 mg, Nightly  DULoxetine, 20 mg, Daily  isosorbide mononitrate, 30 mg, Daily  levothyroxine, 150 mcg, Q AM  metoprolol tartrate, 12.5 mg, Q12H  pantoprazole, 40 mg, BID AC  sertraline, 100 mg, Daily  traZODone, 150 mg, Nightly            INTAKE AND OUTPUT:    Intake/Output Summary (Last 24 hours) at 3/26/2023 1147  Last data filed at 3/26/2023 0500  Gross per 24 hour   Intake 480 ml   Output --   Net 480 ml       Review of Systems:   GI:  Cardiac: Still have chest pain  Pulmonary:    Constitutional:  Temp:  [97.7 °F (36.5 °C)-98.8 °F (37.1 °C)] 98.3 °F (36.8 °C)  Heart Rate:  [61-81] 61  Resp:  [18] 18  BP: (122-149)/(63-78) 136/76    Physical Exam:  General:  Appears in no acute distress  Eyes: PERTL,  HEENT:  No JVD. Thyroid not visibly enlarged. No mucosal pallor or cyanosis  Respiratory: Respirations  "regular and unlabored at rest. BBS with good air entry in all fields. No crackles, rubs or wheezes auscultated  Cardiovascular: S1S2 Regular rate and rhythm. No murmur, rub or gallop. No carotid bruits. DP/PT pulses     . No pretibial pitting edema  Gastrointestinal: Abdomen soft, flat, non tender. Bowel sounds present. No hepatosplenomegaly. No ascites  Musculoskeletal: LYNN x4. No abnormal movements  Extremities: No digital clubbing or cyanosis  Skin: Color pink. Skin warm and dry to touch. No rashes    Neuro: AAO x3 CN II-XII grossly intact  Psych: Mood and affect normal, pleasant and cooperative          Cardiographics  Telemetry:     ECG:     Echocardiogram:     Lab Review   Results from last 7 days   Lab Units 03/26/23  0526 03/25/23  0815 03/25/23  0613   HSTROP T ng/L 53* 56* 57*     Results from last 7 days   Lab Units 03/25/23  0815   MAGNESIUM mg/dL 2.1     Results from last 7 days   Lab Units 03/26/23  0526   SODIUM mmol/L 141   POTASSIUM mmol/L 3.4*   BUN mg/dL 18   CREATININE mg/dL 0.88   CALCIUM mg/dL 8.8     @[id number]@  Results from last 7 days   Lab Units 03/26/23  0526 03/25/23  0613 03/24/23  1515   WBC 10*3/mm3 4.72 5.51 4.71   HEMOGLOBIN g/dL 11.5* 12.5* 13.3   HEMATOCRIT % 35.0* 38.6 39.8   PLATELETS 10*3/mm3 116* 132* 131*             Assessment:  Musculoskeletal chest pain worse with pressure  Borderline elevated troponin  Abnormal EKG  Obesity  Hypertension      Plan:    Continues to complains of chest  Appears musculoskeletal  Overweight for the nuclear table  May have to do PET Cardiolite tomorrow      )3/26/2023  MD SHADI Personon/Transcription:   \"Dictated utilizing Dragon dictation\".     "

## 2023-03-26 NOTE — PLAN OF CARE
Goal Outcome Evaluation:  Plan of Care Reviewed With: patient        Progress: improving  Outcome Evaluation: Patient alert and oriented X4. C/o chronic pain in the lower back and bilateral lower extremities, managed with IV morphine. Patient NPO after midnight for stress test,. ECHO to be completed. IVFs continued with K+. VSS. Patient incont of urine and stool

## 2023-03-26 NOTE — CONSULTS
Pt states he is concerned about his living situation and about his health. He has strong geraldine in God and is involved with a local [employer] mostly through phone calls since he is currently unable to get to [employer]. He says his geraldine gives him strength.

## 2023-03-26 NOTE — CONSULTS
"Patient seen by [facility] d/t depression. Patient interviewed alone in room 606 (6S). Introduced self and role. Patient agreed to be evaluated. Patient is A/OX4.     The patient is a 73 y.o.  male living alone in a house. The patient stated he is on [address] housing and has concerns about being able to return where he was living. Patient encouraged to discuss with .  Nondenominational/Spiritual: Anabaptism. Used to attend Taoism \"when I was healthier.\" The patient did state people from his Taoism call him and pray with him. The patient considers his Taoism community a good support for him.  Children: 2 daughters. The patient stated he hasn't seen them in years.  Occupation: Retired. 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  Hobbies: \"not really.\" Watching any sports on t.v.  Education: College- broadcasting school.  Legal: Denies  : Gaviota Amezquita (friend)  : No  Support system: Friend Chepe, [facility]  Hx of Violence: Denies  Hx of Abuse/Trauma: Denies  Feel safe at home: Yes    The patient presented to the ED on 3/24/23 with complaints of weakness, decreased PO intake, and inability to take his medications d/t being bed bound. The patient was found to have hypothyroidism and elevated troponin. Underwent stress test today which was negative.     The patient has a history of depression and is currently on Cymbalta, Zoloft, and trazadone. The patient stated he has been on these medications for years and they are prescribed by his PCP. The patient stated his medications work well for him but his depression has increased d/t having \"trouble living by myself, not being able to take care of myself.\" The patient stated he is bed bound and has not been able to walk for the past 1.5 years. The patient talked about having polio as a child which has cause [facility] to have severe back pain and leg pain which contribute to " "him not being able to care for himself.The patient stated previously a \"lady friend\" stayed with him and helped take care of him and things around the house. The patient stated his his \"lady friend\" recently had to go live with her daughter d/t having dementia and the patient has been alone in the house since. The patient stated all his family lives on the [facility]. The patient stated he believes his mental health medications \"is what's keeping me together.\"     The patient rated his depression 8/10 and anxiety 7/10. The patient denied SI, stating \"I'm going to fight through it.\" the patient denied any past suicidal thoughts or attempts. The patient denied wish to be dead, HI, or hallucinations. The patient reported poor sleep. The patient stated his appetite has been decreased in the past 2 weeks. The patient voiced he saw a therapist 10-15 years ago. He denied any other mental health treatment.      The patient denied any substance use. No UDS upon admission.     The patient is currently focused on housing situation and improving medically/functionally but is interested in outpatient mental health treatment. Psychiatrist to see. Access to follow.       "

## 2023-03-27 ENCOUNTER — APPOINTMENT (OUTPATIENT)
Dept: CARDIOLOGY | Facility: HOSPITAL | Age: 73
End: 2023-03-27
Payer: MEDICARE

## 2023-03-27 LAB
AORTIC DIMENSIONLESS INDEX: 0.7 (DI)
ASCENDING AORTA: 2.9 CM
BH CV ECHO MEAS - ACS: 1.83 CM
BH CV ECHO MEAS - AO MAX PG: 8.6 MMHG
BH CV ECHO MEAS - AO MEAN PG: 4.7 MMHG
BH CV ECHO MEAS - AO V2 MAX: 146.4 CM/SEC
BH CV ECHO MEAS - AO V2 VTI: 35.6 CM
BH CV ECHO MEAS - AVA(I,D): 2.08 CM2
BH CV ECHO MEAS - CONTRAST EF 4CH: 68 CM2
BH CV ECHO MEAS - EDV(CUBED): 118.2 ML
BH CV ECHO MEAS - EDV(MOD-SP2): 117 ML
BH CV ECHO MEAS - EDV(MOD-SP4): 112 ML
BH CV ECHO MEAS - EF(MOD-BP): 67.7 %
BH CV ECHO MEAS - EF(MOD-SP2): 67.5 %
BH CV ECHO MEAS - EF(MOD-SP4): 67.9 %
BH CV ECHO MEAS - ESV(CUBED): 35.9 ML
BH CV ECHO MEAS - ESV(MOD-SP2): 38 ML
BH CV ECHO MEAS - ESV(MOD-SP4): 36 ML
BH CV ECHO MEAS - FS: 32.8 %
BH CV ECHO MEAS - IVS/LVPW: 0.9 CM
BH CV ECHO MEAS - IVSD: 1.04 CM
BH CV ECHO MEAS - LAT PEAK E' VEL: 7.1 CM/SEC
BH CV ECHO MEAS - LV DIASTOLIC VOL/BSA (35-75): 45 CM2
BH CV ECHO MEAS - LV MASS(C)D: 199 GRAMS
BH CV ECHO MEAS - LV MAX PG: 4.4 MMHG
BH CV ECHO MEAS - LV MEAN PG: 2.32 MMHG
BH CV ECHO MEAS - LV SYSTOLIC VOL/BSA (12-30): 14.5 CM2
BH CV ECHO MEAS - LV V1 MAX: 105.4 CM/SEC
BH CV ECHO MEAS - LV V1 VTI: 23.5 CM
BH CV ECHO MEAS - LVIDD: 4.9 CM
BH CV ECHO MEAS - LVIDS: 3.3 CM
BH CV ECHO MEAS - LVOT AREA: 3.1 CM2
BH CV ECHO MEAS - LVOT DIAM: 2 CM
BH CV ECHO MEAS - LVPWD: 1.15 CM
BH CV ECHO MEAS - MED PEAK E' VEL: 6.7 CM/SEC
BH CV ECHO MEAS - MV A DUR: 0.14 SEC
BH CV ECHO MEAS - MV A MAX VEL: 70.3 CM/SEC
BH CV ECHO MEAS - MV DEC SLOPE: 307.1 CM/SEC2
BH CV ECHO MEAS - MV DEC TIME: 180 MSEC
BH CV ECHO MEAS - MV E MAX VEL: 85.3 CM/SEC
BH CV ECHO MEAS - MV E/A: 1.21
BH CV ECHO MEAS - MV MAX PG: 4.5 MMHG
BH CV ECHO MEAS - MV MEAN PG: 1.61 MMHG
BH CV ECHO MEAS - MV P1/2T: 97.3 MSEC
BH CV ECHO MEAS - MV V2 VTI: 34.1 CM
BH CV ECHO MEAS - MVA(P1/2T): 2.26 CM2
BH CV ECHO MEAS - MVA(VTI): 2.17 CM2
BH CV ECHO MEAS - PA ACC TIME: 0.12 SEC
BH CV ECHO MEAS - PA PR(ACCEL): 25.5 MMHG
BH CV ECHO MEAS - PA V2 MAX: 112.7 CM/SEC
BH CV ECHO MEAS - PULM A REVS DUR: 0.14 SEC
BH CV ECHO MEAS - PULM A REVS VEL: 23.3 CM/SEC
BH CV ECHO MEAS - PULM DIAS VEL: 22.9 CM/SEC
BH CV ECHO MEAS - PULM S/D: 1.23
BH CV ECHO MEAS - PULM SYS VEL: 28.2 CM/SEC
BH CV ECHO MEAS - RAP SYSTOLE: 8 MMHG
BH CV ECHO MEAS - RV MAX PG: 1.46 MMHG
BH CV ECHO MEAS - RV V1 MAX: 60.4 CM/SEC
BH CV ECHO MEAS - RV V1 VTI: 12.9 CM
BH CV ECHO MEAS - RVSP: 22.4 MMHG
BH CV ECHO MEAS - SI(MOD-SP2): 31.8 ML/M2
BH CV ECHO MEAS - SI(MOD-SP4): 30.6 ML/M2
BH CV ECHO MEAS - SV(LVOT): 73.9 ML
BH CV ECHO MEAS - SV(MOD-SP2): 79 ML
BH CV ECHO MEAS - SV(MOD-SP4): 76 ML
BH CV ECHO MEAS - TAPSE (>1.6): 2.04 CM
BH CV ECHO MEAS - TR MAX PG: 14.4 MMHG
BH CV ECHO MEAS - TR MAX VEL: 190 CM/SEC
BH CV ECHO MEASUREMENTS AVERAGE E/E' RATIO: 12.36
BH CV XLRA - RV BASE: 3.6 CM
BH CV XLRA - RV LENGTH: 7.7 CM
BH CV XLRA - RV MID: 2.28 CM
BH CV XLRA - TDI S': 14.1 CM/SEC
LEFT ATRIUM VOLUME INDEX: 20.6 ML/M2
MAXIMAL PREDICTED HEART RATE: 147 BPM
SINUS: 2.9 CM
STRESS TARGET HR: 125 BPM

## 2023-03-27 PROCEDURE — 93306 TTE W/DOPPLER COMPLETE: CPT

## 2023-03-27 PROCEDURE — 97530 THERAPEUTIC ACTIVITIES: CPT

## 2023-03-27 PROCEDURE — 97166 OT EVAL MOD COMPLEX 45 MIN: CPT

## 2023-03-27 PROCEDURE — 25510000001 PERFLUTREN (DEFINITY) 8.476 MG IN SODIUM CHLORIDE (PF) 0.9 % 10 ML INJECTION: Performed by: INTERNAL MEDICINE

## 2023-03-27 PROCEDURE — 25010000002 MORPHINE PER 10 MG: Performed by: INTERNAL MEDICINE

## 2023-03-27 PROCEDURE — 96376 TX/PRO/DX INJ SAME DRUG ADON: CPT

## 2023-03-27 PROCEDURE — 25010000002 SODIUM CHLORIDE 0.9 % WITH KCL 20 MEQ 20-0.9 MEQ/L-% SOLUTION: Performed by: HOSPITALIST

## 2023-03-27 PROCEDURE — 99232 SBSQ HOSP IP/OBS MODERATE 35: CPT | Performed by: NURSE PRACTITIONER

## 2023-03-27 PROCEDURE — 93306 TTE W/DOPPLER COMPLETE: CPT | Performed by: INTERNAL MEDICINE

## 2023-03-27 PROCEDURE — 96361 HYDRATE IV INFUSION ADD-ON: CPT

## 2023-03-27 RX ORDER — POTASSIUM CHLORIDE 750 MG/1
10 TABLET, FILM COATED, EXTENDED RELEASE ORAL DAILY
Status: DISCONTINUED | OUTPATIENT
Start: 2023-03-27 | End: 2023-03-27

## 2023-03-27 RX ORDER — AMLODIPINE BESYLATE 5 MG/1
5 TABLET ORAL DAILY
Status: DISCONTINUED | OUTPATIENT
Start: 2023-03-28 | End: 2023-03-29 | Stop reason: HOSPADM

## 2023-03-27 RX ORDER — DULOXETIN HYDROCHLORIDE 20 MG/1
40 CAPSULE, DELAYED RELEASE ORAL DAILY
Status: DISCONTINUED | OUTPATIENT
Start: 2023-03-28 | End: 2023-03-29 | Stop reason: HOSPADM

## 2023-03-27 RX ORDER — POTASSIUM CHLORIDE 750 MG/1
40 TABLET, FILM COATED, EXTENDED RELEASE ORAL ONCE
Status: COMPLETED | OUTPATIENT
Start: 2023-03-27 | End: 2023-03-27

## 2023-03-27 RX ORDER — ISOSORBIDE MONONITRATE 30 MG/1
30 TABLET, EXTENDED RELEASE ORAL
Status: DISCONTINUED | OUTPATIENT
Start: 2023-03-27 | End: 2023-03-29 | Stop reason: HOSPADM

## 2023-03-27 RX ADMIN — LEVOTHYROXINE SODIUM 150 MCG: 0.15 TABLET ORAL at 05:14

## 2023-03-27 RX ADMIN — MORPHINE SULFATE 2 MG: 2 INJECTION, SOLUTION INTRAMUSCULAR; INTRAVENOUS at 00:04

## 2023-03-27 RX ADMIN — ASPIRIN 81 MG: 81 TABLET, CHEWABLE ORAL at 08:46

## 2023-03-27 RX ADMIN — TRAZODONE HYDROCHLORIDE 150 MG: 100 TABLET ORAL at 19:51

## 2023-03-27 RX ADMIN — POTASSIUM CHLORIDE 40 MEQ: 750 TABLET, EXTENDED RELEASE ORAL at 16:09

## 2023-03-27 RX ADMIN — PANTOPRAZOLE SODIUM 40 MG: 40 TABLET, DELAYED RELEASE ORAL at 17:17

## 2023-03-27 RX ADMIN — METOPROLOL TARTRATE 12.5 MG: 25 TABLET, FILM COATED ORAL at 19:51

## 2023-03-27 RX ADMIN — METOPROLOL TARTRATE 12.5 MG: 25 TABLET, FILM COATED ORAL at 08:46

## 2023-03-27 RX ADMIN — POTASSIUM CHLORIDE AND SODIUM CHLORIDE 75 ML/HR: 900; 150 INJECTION, SOLUTION INTRAVENOUS at 05:15

## 2023-03-27 RX ADMIN — MORPHINE SULFATE 2 MG: 2 INJECTION, SOLUTION INTRAMUSCULAR; INTRAVENOUS at 09:45

## 2023-03-27 RX ADMIN — PERFLUTREN 2 ML: 6.52 INJECTION, SUSPENSION INTRAVENOUS at 08:09

## 2023-03-27 RX ADMIN — ISOSORBIDE MONONITRATE 30 MG: 30 TABLET, EXTENDED RELEASE ORAL at 08:46

## 2023-03-27 RX ADMIN — PANTOPRAZOLE SODIUM 40 MG: 40 TABLET, DELAYED RELEASE ORAL at 05:13

## 2023-03-27 RX ADMIN — AMLODIPINE BESYLATE 10 MG: 5 TABLET ORAL at 08:46

## 2023-03-27 RX ADMIN — ISOSORBIDE MONONITRATE 30 MG: 30 TABLET, EXTENDED RELEASE ORAL at 17:17

## 2023-03-27 RX ADMIN — DULOXETINE HYDROCHLORIDE 20 MG: 20 CAPSULE, DELAYED RELEASE ORAL at 08:46

## 2023-03-27 RX ADMIN — ATORVASTATIN CALCIUM 10 MG: 20 TABLET, FILM COATED ORAL at 19:51

## 2023-03-27 RX ADMIN — MORPHINE SULFATE 2 MG: 2 INJECTION, SOLUTION INTRAMUSCULAR; INTRAVENOUS at 23:53

## 2023-03-27 RX ADMIN — MORPHINE SULFATE 2 MG: 2 INJECTION, SOLUTION INTRAMUSCULAR; INTRAVENOUS at 19:50

## 2023-03-27 RX ADMIN — SERTRALINE 100 MG: 100 TABLET, FILM COATED ORAL at 08:46

## 2023-03-27 NOTE — PLAN OF CARE
Goal Outcome Evaluation:  Plan of Care Reviewed With: patient           Outcome Evaluation: Echo completed this am.  Morphine administered this am per PRN order for back and BLE pain and effective.  BM this shift and patient incontinent of bowel.  Continuous fluids with 20 K.  Access rounded on patient and medication adjusted.  VS and labs monitored.

## 2023-03-27 NOTE — THERAPY TREATMENT NOTE
Patient Name: Nicanor TAN Ar  : 1950    MRN: [id number]                              Today's Date: 3/27/2023       Admit Date: 3/24/2023    Visit Dx:     ICD-10-CM ICD-9-CM   1. Generalized weakness  R53.1 780.79   2. Elevated TSH  R79.89 794.5   3. Elevated troponin  R77.8 790.6     Patient Active Problem List   Diagnosis   • History of colon polyps   • Acute pancreatitis due to calculus of common bile duct   • Abdominal pain, generalized   • Closed left tibial fracture   • Closed bimalleolar fracture of left ankle   • Left tibial fracture   • Closed fracture of upper end of left fibula   • Immobility syndrome   • HTN (hypertension)   • Generalized abdominal pain   • Other constipation   • Diarrhea   • Hx of adenomatous colonic polyps   • Generalized weakness     Past Medical History:   Diagnosis Date   • Arthritis    • Colon cancer (HCC) 2014    tubulovillous adenomas with adenocarcinoma in situ s/p right open colectomy   • Colon polyps 2014    1) cecal polyps: fragments of adenomatous polyp with low grade-dysplasia 2) ascending colon mass bx: fragments of tubulovillous adenoma w/ low grade dysplasia 3) transverse colon polyp: fragments of adenomatous polyp with low grade dysplasia 4) sigmoid polyp: fragments of adenomatous polyp with low grade dysplasia   • Depression    • Gout    • Hearing loss    • Hyperlipemia    • Hypertension    • Hypothyroidism    • Kidney stones    • Polio    • Rheumatoid arthritis (HCC)    • Sleep apnea      Past Surgical History:   Procedure Laterality Date   • CHOLECYSTECTOMY WITH INTRAOPERATIVE CHOLANGIOGRAM N/A 11/10/2017    Procedure: CHOLECYSTECTOMY LAPAROSCOPIC INTRAOPERATIVE CHOLANGIOGRAM;  Surgeon: Elian Carnes MD;  Location: [facility];  Service:    • COLECTOMY PARTIAL / TOTAL Right 2014    Open right colectomy-Dr. Elian Carnes   • COLONOSCOPY N/A 3/15/2017    Procedure: COLONOSCOPY TO ANASTAMOSIS AND TI WITH COLD SNARE POLYPECTOMY ;  Surgeon:  Elian Carnes MD;  Location: [facility] ENDOSCOPY;  Service:    • COLONOSCOPY N/A 08/06/2014    1 cm cecal polyp, 1 cm transverse polyp, 1 cm sigmoid colon polyp, mass in the ascending colon occupying 1/3 of the colonic circumference, mass proximal to the transverse colon occupying 1/4 colonic circumference-Dr. Elian Carnes   • COLONOSCOPY N/A 5/1/2019    Procedure: COLONOSCOPY to ILEOCOLIC ANASTAMOSIS WITH HOT SNARE/COLD BX POLYPECTOMY;  Surgeon: Elian Carnes MD;  Location: [facility] ENDOSCOPY;  Service: General   • FOOT SURGERY Right 2008, 2009   • GASTRIC BANDING N/A 12/28/2009   • NOSE SURGERY N/A 1989   • ORIF TIBIA/FIBULA FRACTURES Left 12/17/2017    Procedure: TIBIA/FIBULA OPEN REDUCTION INTERNAL FIXATION;  Surgeon: Lai Acevedo Jr., MD;  Location: [facility] MAIN OR;  Service:    • ORIF TIBIA/FIBULA FRACTURES Right    • TOTAL KNEE ARTHROPLASTY Bilateral 1/2002, 6/2002 1/2002-left knee 6/2002[facility]      General Information     Row Name 03/27/23 1534          Physical Therapy Time and Intention    Document Type therapy note (daily note)  -     Mode of Treatment physical therapy;co-treatment;occupational therapy  -     Row Name 03/27/23 1535          General Information    Existing Precautions/Restrictions fall  -     Row Name 03/27/23 153          Cognition    Orientation Status (Cognition) oriented to;person;place  -     Row Name 03/27/23 1530          Safety Issues, Functional Mobility    Safety Issues Affecting Function (Mobility) judgment;ability to follow commands;problem-solving;insight into deficits/self-awareness  -     Impairments Affecting Function (Mobility) balance;postural/trunk control;endurance/activity tolerance;strength  -           User Key  (r) = Recorded By, (t) = Taken By, (c) = Cosigned By    Initials Name Provider Type    EB Mercedes Syed PTA Physical Therapist Assistant               Mobility     Row Name 03/27/23 1538          Bed  Mobility    Supine-Sit [place] (Bed Mobility) moderate assist (50% patient effort);2 person assist;verbal cues;nonverbal cues (demo/gesture)  -EB     Sit-Supine [place] (Bed Mobility) maximum assist (25% patient effort);2 person assist  -EB     Assistive Device (Bed Mobility) bed rails;head of bed elevated;draw sheet  -EB     Comment, (Bed Mobility) pt felt dizzy with sitting EOB.  -EB     Row Name 03/27/23 153          Transfers    Comment, (Transfers) not assessed. Pt with poor sitting balance but has been bed ridden for about a year.  -EB           User Key  (r) = Recorded By, (t) = Taken By, (c) = Cosigned By    Initials Name Provider Type    Mercedes Arreola PTA Physical Therapist Assistant               Obj/Interventions     Row Name 03/27/23 1536          Balance    Balance Assessment sitting static balance  -EB     Static Sitting Balance moderate assist;maximum assist  -EB     Position, Sitting Balance sitting edge of bed;supported  -EB     Comment, Balance pt with posterior lean, difficulty following commands to hold self up.  -EB           User Key  (r) = Recorded By, (t) = Taken By, (c) = Cosigned By    Initials Name Provider Type    Mercedes Arreola PTA Physical Therapist Assistant               Goals/Plan    No documentation.                Clinical Impression     Row Name 03/27/23 7900          Plan of Care Review    Plan of Care Reviewed With patient  -EB     Progress no change  -EB     Outcome Evaluation Pt seen for PT/OT co treatment today to maximize therapeutic benefit. Pt reports of being bedridden for about a year. Pt claims to have no help when asked but notes from earlier, said to have friends helping patient. Pt required ModAX2 with supine to sit and Max assist of 2 with sit to supine. Verbal cues givent. Pt required ModA/Max assist with sitting balance. Pt with posterior lean and had difficulty holding self on EOB without support. Pt feeling dizzy with sitting and throughout  treatment. pt requested to lay back down. Will continue to follow pt for d/c needs and to improve mobility.  -EB     Row Name 03/27/23 1537          Therapy Assessment/Plan (PT)    Therapy Frequency (PT) 3 times/wk  -EB     Row Name 03/27/23 1537          Positioning and Restraints    Pre-Treatment Position in bed  -EB     Post Treatment Position bed  -EB     In Bed fowlers;call light within reach;encouraged to call for assist;exit alarm on  -EB           User Key  (r) = Recorded By, (t) = Taken By, (c) = Cosigned By    Initials Name Provider Type    Mercedes Arreola PTA Physical Therapist Assistant               Outcome Measures     Row Name 03/27/23 1541          How much help from another person do you currently need...    Turning from your back to your side while in flat bed without using bedrails? 2  -EB     Moving from lying on back to sitting on the side of a flat bed without bedrails? 2  -EB     Moving to and from a bed to a chair (including a wheelchair)? 1  -EB     Standing up from a chair using your arms (e.g., wheelchair, bedside chair)? 1  -EB     Climbing 3-5 steps with a railing? 1  -EB     To walk in hospital room? 1  -EB     AM-PAC 6 Clicks Score (PT) 8  -EB     Highest level of mobility 3 --> Sat at edge of bed  -EB           User Key  (r) = Recorded By, (t) = Taken By, (c) = Cosigned By    Initials Name Provider Type    Mercedes Arreola PTA Physical Therapist Assistant                             Physical Therapy Education     Title: PT OT SLP Therapies (Done)     Topic: Physical Therapy (Done)     Point: Mobility training (Done)     Learning Progress Summary           Patient Acceptance, E,D, VU,NR by EB at 3/27/2023 1541    Acceptance, E,D, VU,NR by MS at 3/25/2023 1105                   Point: Home exercise program (Done)     Learning Progress Summary           Patient Acceptance, E,D, VU,NR by MS at 3/25/2023 1105                   Point: Body mechanics (Done)     Learning Progress Summary            Patient Acceptance, E,D, VU,NR by  at 3/27/2023 1541    Acceptance, E,D, VU,NR by MS at 3/25/2023 1105                   Point: Precautions (Done)     Learning Progress Summary           Patient Acceptance, E,D, VU,NR by  at 3/27/2023 1541                               User Key     Initials Effective Dates Name Provider Type Discipline    MS 06/16/21 -  Elvis Tejeda PT Physical Therapist PT     02/14/23 -  Mercedes Syed PTA Physical Therapist Assistant PT              PT Recommendation and Plan     Plan of Care Reviewed With: patient  Progress: no change  Outcome Evaluation: Pt seen for PT/OT co treatment today to maximize therapeutic benefit. Pt reports of being bedridden for about a year. Pt claims to have no help when asked but notes from earlier, said to have friends helping patient. Pt required ModAX2 with supine to sit and Max assist of 2 with sit to supine. Verbal cues givent. Pt required ModA/Max assist with sitting balance. Pt with posterior lean and had difficulty holding self on EOB without support. Pt feeling dizzy with sitting and throughout treatment. pt requested to lay back down. Will continue to follow pt for d/c needs and to improve mobility.     Time Calculation:    PT Charges     Row Name 03/27/23 1534             Time Calculation    Start Time 1349  -EB      Stop Time 1405  -EB      Time Calculation (min) 16 min  -EB      PT Received On 03/27/23  -EB      PT - Next Appointment 03/29/23  -EB         Time Calculation- PT    Total Timed Code Minutes- PT 16 minute(s)  -EB            User Key  (r) = Recorded By, (t) = Taken By, (c) = Cosigned By    Initials Name Provider Type     Mercedes Syed PTA Physical Therapist Assistant              Therapy Charges for Today     Code Description Service Date Service Provider Modifiers Qty    69293895402 HC PT THERAPEUTIC ACT EA 15 MIN 3/27/2023 Mercedes Syed PTA GP 1    49112798870 HC PT THER SUPP EA 15 MIN 3/27/2023 Mercedes Syed PTA  GP 1          PT G-Codes  Outcome Measure Options: AM-PAC 6 Clicks Basic Mobility (PT)  AM-PAC 6 Clicks Score (PT): 8       Mercedes Syed, PTA  3/27/2023

## 2023-03-27 NOTE — PROGRESS NOTES
[facility]  Cardiology Progress Note    Patient Identification:  Name: Nicanor Haley  Age: 73 y.o.  Sex: male  :  1950  MRN: [id number]                 Follow Up / Chief Complaint: Chest pain    Interval History: Stress test was a negative study.  No shortness of breath.  Some muscular chest pain.       Objective:    Past Medical History:  Past Medical History:   Diagnosis Date   • Arthritis    • Colon cancer (HCC)     tubulovillous adenomas with adenocarcinoma in situ s/p right open colectomy   • Colon polyps 2014    1) cecal polyps: fragments of adenomatous polyp with low grade-dysplasia 2) ascending colon mass bx: fragments of tubulovillous adenoma w/ low grade dysplasia 3) transverse colon polyp: fragments of adenomatous polyp with low grade dysplasia 4) sigmoid polyp: fragments of adenomatous polyp with low grade dysplasia   • Depression    • Gout    • Hearing loss    • Hyperlipemia    • Hypertension    • Hypothyroidism    • Kidney stones    • Polio    • Rheumatoid arthritis (HCC)    • Sleep apnea      Past Surgical History:  Past Surgical History:   Procedure Laterality Date   • CHOLECYSTECTOMY WITH INTRAOPERATIVE CHOLANGIOGRAM N/A 11/10/2017    Procedure: CHOLECYSTECTOMY LAPAROSCOPIC INTRAOPERATIVE CHOLANGIOGRAM;  Surgeon: Elian Carnes MD;  Location: [facility] OR;  Service:    • COLECTOMY PARTIAL / TOTAL Right 2014    Open right colectomy-Dr. Elian Carnes   • COLONOSCOPY N/A 3/15/2017    Procedure: COLONOSCOPY TO ANASTAMOSIS AND TI WITH COLD SNARE POLYPECTOMY ;  Surgeon: Elian Carnes MD;  Location: [facility] ENDOSCOPY;  Service:    • COLONOSCOPY N/A 2014    1 cm cecal polyp, 1 cm transverse polyp, 1 cm sigmoid colon polyp, mass in the ascending colon occupying 1/3 of the colonic circumference, mass proximal to the transverse colon occupying 1/4 colonic circumference-Dr. Eilan aCrnes   • COLONOSCOPY N/A 2019    Procedure: COLONOSCOPY to ILEOCOLIC  ANASTAMOSIS WITH HOT SNARE/COLD BX POLYPECTOMY;  Surgeon: Elian Carnes MD;  Location: [facility] ENDOSCOPY;  Service: General   • FOOT SURGERY Right 2008, 2009   • GASTRIC BANDING N/A 12/28/2009   • NOSE SURGERY N/A 1989   • ORIF TIBIA/FIBULA FRACTURES Left 12/17/2017    Procedure: TIBIA/FIBULA OPEN REDUCTION INTERNAL FIXATION;  Surgeon: Lai Acevedo Jr., MD;  Location: [facility] MAIN OR;  Service:    • ORIF TIBIA/FIBULA FRACTURES Right    • TOTAL KNEE ARTHROPLASTY Bilateral 1/2002, 6/2002 1/2002-left knee 6[facility]        Social History:   Social History     Tobacco Use   • Smoking status: Never   • Smokeless tobacco: Never   Substance Use Topics   • Alcohol use: No      Family History:  Family History   Problem Relation Age of Onset   • Lung cancer Mother    • Hypertension Father    • Diabetes Sister    • Hypertension Brother    • Hypertension Brother    • Lung cancer Brother           Allergies:  Allergies   Allergen Reactions   • Keflex [Cephalexin] Mental Status Change     Pt reports that it makes him violent (12/17/17)      Scheduled Meds:  amLODIPine, 10 mg, Daily  aspirin, 81 mg, Daily  atorvastatin, 10 mg, Nightly  [START ON 3/28/2023] DULoxetine, 40 mg, Daily  levothyroxine, 150 mcg, Q AM  metoprolol tartrate, 12.5 mg, Q12H  pantoprazole, 40 mg, BID AC  potassium chloride, 40 mEq, Once  sertraline, 100 mg, Daily  traZODone, 150 mg, Nightly            INTAKE AND OUTPUT:    Intake/Output Summary (Last 24 hours) at 3/27/2023 1536  Last data filed at 3/27/2023 0847  Gross per 24 hour   Intake 240 ml   Output 500 ml   Net -260 ml       Review of Systems:   GI: Denies nausea vomiting  Cardiac: Still have chest pain which could be muscular  Pulmonary: Denies shortness of breath    Constitutional:  Temp:  [97.9 °F (36.6 °C)-98.8 °F (37.1 °C)] 98.4 °F (36.9 °C)  Heart Rate:  [60-68] 61  Resp:  [18] 18  BP: (118-138)/(69-79) 127/73    Physical Exam:  General:  Appears in no acute  distress, resting in bed  Eyes: eom normal no conjunctival drainage  HEENT:  No JVD. Thyroid not visibly enlarged. No mucosal pallor or cyanosis  Respiratory: Respirations regular and unlabored at rest. BBS with good air entry in all fields. No crackles, rubs or wheezes auscultated  Cardiovascular: S1S2 Regular rate and rhythm. No murmur, rub or gallop. No carotid bruits. DP/PT pulses     . No pretibial pitting edema  Gastrointestinal: Abdomen soft, flat, non tender. Bowel sounds present. No hepatosplenomegaly. No ascites  Skin:   Skin warm and dry to touch. No rashes    Neuro: AAO x3 CN II-XII grossly intact  Psych: Mood and affect normal, pleasant and cooperative          I reviewed the patient's new clinical results, and personally reviewed and interpreted the patient's ECG and telemetry data from the last 24 hours    Cardiographics  Telemetry: Sinus rhythm    ECG:   Interpretation Summary       •  Left ventricular ejection fraction appears to be 66 - 70%.  •  Left ventricular diastolic function was normal.  •  Estimated right ventricular systolic pressure from tricuspid regurgitation is normal (<35 mmHg).     Interpretation Summary       •  Findings consistent with a normal ECG stress test.  •  Left ventricular ejection fraction is normal (Calculated EF = 70%).  •  Myocardial perfusion imaging indicates a normal myocardial perfusion study with no evidence of ischemia.  •  Impressions are consistent with a low risk study.    Echocardiogram:     Lab Review   Results from last 7 days   Lab Units 03/26/23  0526 03/25/23  0815 03/25/23  0613   HSTROP T ng/L 53* 56* 57*     Results from last 7 days   Lab Units 03/25/23  0815   MAGNESIUM mg/dL 2.1     Results from last 7 days   Lab Units 03/26/23  0526   SODIUM mmol/L 141   POTASSIUM mmol/L 3.4*   BUN mg/dL 18   CREATININE mg/dL 0.88   CALCIUM mg/dL 8.8     @[id number]@  Results from last 7 days   Lab Units 03/26/23  0526 03/25/23  0613 03/24/23  1515   WBC 10*3/mm3  "4.72 5.51 4.71   HEMOGLOBIN g/dL 11.5* 12.5* 13.3   HEMATOCRIT % 35.0* 38.6 39.8   PLATELETS 10*3/mm3 116* 132* 131*            The following medical decision was discussed in detail with Dr. Melendrez        Assessment:  Musculoskeletal chest pain worse with pressure  Borderline elevated troponin  Abnormal EKG  Obesity  Hypertension      Plan:  Sinus rhythm on the monitor without any events overnight.  Continues to complains of chest, appears to be muscular.    Stress test was a negative study.  Add Imdur and decrease amlodipine.      It was explained to the patient that stress testing carries 85% specificity/sensitivity, and does not rule out future cardiac event.    )3/27/2023  REZA Calhoun      EMR Dragon/Transcription:   \"Dictated utilizing Dragon dictation\".     "

## 2023-03-27 NOTE — CASE MANAGEMENT/SOCIAL WORK
Discharge Planning Assessment  [facility]     Patient Name: Nicanor Haley  MRN: [id number]  Today's Date: 3/27/2023    Admit Date: 3/24/2023    Plan: SNF referrals pending.   Discharge Needs Assessment    No documentation.                Discharge Plan     Row Name 03/27/23 1524       Plan    Plan SNF referrals pending.    Patient/Family in Agreement with Plan yes    Plan Comments Spoke with patient at bedside. Introduced self and explained CCP role. Verified facesheet and local pharmacy [facility], patient is enrolled in meds to bed. Denies difficulty with medication cost/ management. Patient lives alone in first floor apartment with a ramp to enter. Patient denies prior HH and SNF. Patient has a wheelchair. Patient states that he does not cook for himself; friends and family help provide, he currently still drives. Patient given SNF list for referral choices, patient request referrals to facilities close to where he currently lives.  Referral to the [facility] [place]/ Kirstin [phone] and [facility]/ [place] [phone]. CCP to follow for SNF needs.              Continued Care and Services - Admitted Since 3/24/2023     Destination     Service Provider Request Status Selected Services Address Phone Fax Patient Preferred    [facility] Pending - Request Sent N/A [address] [address] [phone] --    [facility] Pending - Request Sent N/A [address] [address] [phone] --    [facility] Pending - Request Sent N/A [address] [facility] [id number] [phone] --              Expected Discharge Date and Time     Expected Discharge Date Expected Discharge Time    Mar 28, 2023          Demographic Summary     Row Name 03/27/23 1523       General Information    Admission Type inpatient    Arrived From emergency department    Required  Notices Provided Important Message from Medicare    Referral Source admission list    Reason for Consult discharge planning    Preferred Language English       Contact Information    Permission Granted to Share Info With                Functional Status     Row Name 03/27/23 1524       Functional Status    Usual Activity Tolerance moderate    Current Activity Tolerance fair       Functional Status, IADL    Medications independent    Meal Preparation completely dependent    Housekeeping completely dependent    Laundry completely dependent    Shopping assistive equipment       Mental Status    General Appearance WDL WDL               Psychosocial    No documentation.                Abuse/Neglect    No documentation.                Legal    No documentation.                Substance Abuse    No documentation.                Patient Forms    No documentation.                   Libertad Moore RN  Case Management Discharge Note                Selected Continued Care - Admitted Since 3/24/2023     Destination    No services have been selected for the patient.              Durable Medical Equipment    No services have been selected for the patient.              Dialysis/Infusion    No services have been selected for the patient.              Home Medical Care    No services have been selected for the patient.              Therapy    No services have been selected for the patient.              Community Resources    No services have been selected for the patient.              Community & DME    No services have been selected for the patient.

## 2023-03-27 NOTE — PROGRESS NOTES
"Daily progress note    Primary care physician  Dr. Atkinson    Chief complaint  Doing same with no new complaint and denies any chest pain shortness of breath palpitation.    History of present illness  73 white male with history of hypertension hyperlipidemia hypothyroidism osteoarthritis and depression who has been noncompliant with medications presented to [facility] emergency room with generalized weakness.  Patient unable to take care of himself.  Patient has not taken his medication for last several weeks.  Patient denies any headache dizziness chest pain shortness of breath abdominal pain nausea vomiting diarrhea.  Patient work-up in ER revealed severe hypothyroidism who has been noncompliant with medication and also elevated troponin with no chest pain admit for management.  Patient lives by himself and willing to go to nursing home after stabilization.     REVIEW OF SYSTEMS  Unremarkable except weakness     PHYSICAL EXAM  Blood pressure 127/73, pulse 61, temperature 98.4 °F (36.9 °C), temperature source Oral, resp. rate 18, height 177.8 cm (70\"), weight (!) 137 kg (302 lb), SpO2 93 %.    Constitutional:       Appearance: Normal appearance. He is obese.   HENT:      Head: Normocephalic and atraumatic.      Mouth/Throat:      Mouth: Mucous membranes are moist.   Eyes:      Extraocular Movements: Extraocular movements intact.      Pupils: Pupils are equal, round, and reactive to light.   Cardiovascular:      Rate and Rhythm: Normal rate and regular rhythm.   Pulmonary:      Effort: Pulmonary effort is normal.      Breath sounds: Normal breath sounds.   Abdominal:      General: There is no distension.      Palpations: Abdomen is soft.      Tenderness: There is no abdominal tenderness.   Musculoskeletal:      Cervical back: Normal range of motion and neck supple.   Neurological:      Mental Status: He is alert and oriented to person, place, and time. Mental status is at baseline.   Psychiatric:         " Mood and Affect: Mood normal.         Behavior: Behavior normal.         Thought Content: Thought content normal.         Judgment: Judgment normal.      LAB RESULTS  Lab Results (last 24 hours)     ** No results found for the last 24 hours. **        Imaging Results (Last 24 Hours)     ** No results found for the last 24 hours. **        ECG 12 Lead         Component  Ref Range & Units 09:23  (3/25/23) 1 d ago  (3/24/23) 1 d ago  (3/24/23)   QT Interval  ms 484 P  550  562    Resulting Agency  ECG  ECG  ECG             HEART RATE= 75  bpm  RR Interval= 800  ms  MT Interval= 185  ms  P Horizontal Axis= 0  deg  P Front Axis= 29  deg  QRSD Interval= 98  ms  QT Interval= 484  ms  QRS Axis= 38  deg  T Wave Axis= 85  deg  - ABNORMAL ECG -  Sinus rhythm  Low voltage, precordial leads  Nonspecific T abnrm, anterolateral leads  Prolonged QT interval           2D echo showed normal ejection fraction  Stress Cardiolite showed no ischemia    Current Facility-Administered Medications:   •  acetaminophen (TYLENOL) tablet 650 mg, 650 mg, Oral, Q6H PRN, Gregg Solorzano MD, 650 mg at 03/25/23 0911  •  amLODIPine (NORVASC) tablet 10 mg, 10 mg, Oral, Daily, Gregg Solorzano MD, 10 mg at 03/27/23 0846  •  aspirin chewable tablet 81 mg, 81 mg, Oral, Daily, Gregg Solorzano MD, 81 mg at 03/27/23 0846  •  atorvastatin (LIPITOR) tablet 10 mg, 10 mg, Oral, Nightly, Gregg Solorzano MD, 10 mg at 03/26/23 2003  •  [START ON 3/28/2023] DULoxetine (CYMBALTA) DR capsule 40 mg, 40 mg, Oral, Daily, David Vásquez III, MD  •  Influenza Vac High-Dose Quad (FLUZONE HIGH DOSE) injection 0.7 mL, 0.7 mL, Intramuscular, During Hospitalization, Gregg Solorzano MD  •  ipratropium-albuterol (DUO-NEB) nebulizer solution 3 mL, 3 mL, Nebulization, Q4H PRN, Gregg Solorzano MD  •  isosorbide mononitrate (IMDUR) 24 hr tablet 30 mg, 30 mg, Oral, Daily, Gregg Solorzano MD, 30 mg at 03/27/23 0846  •  levothyroxine (SYNTHROID, LEVOTHROID) tablet 150 mcg, 150 mcg,  Oral, Q AM, Christina Vaughn MD, 150 mcg at 03/27/23 0514  •  metoprolol tartrate (LOPRESSOR) tablet 12.5 mg, 12.5 mg, Oral, Q12H, Christina Vaughn MD, 12.5 mg at 03/27/23 0846  •  morphine injection 2 mg, 2 mg, Intravenous, Q4H PRN, Crissy Melendrez MD, 2 mg at 03/27/23 0945  •  nitroglycerin (NITROSTAT) SL tablet 0.4 mg, 0.4 mg, Sublingual, Q5 Min PRN, Hillary Crane APRN, 0.4 mg at 03/25/23 1112  •  pantoprazole (PROTONIX) EC tablet 40 mg, 40 mg, Oral, BID AC, Christina Vaughn MD, 40 mg at 03/27/23 0513  •  sertraline (ZOLOFT) tablet 100 mg, 100 mg, Oral, Daily, Christina Vaughn MD, 100 mg at 03/27/23 0846  •  sodium chloride 0.9 % with KCl 20 mEq/L infusion, 75 mL/hr, Intravenous, Continuous, Christina Vaughn MD, Last Rate: 75 mL/hr at 03/27/23 0515, 75 mL/hr at 03/27/23 0515  •  traZODone (DESYREL) tablet 150 mg, 150 mg, Oral, Nightly, Christina Vaughn MD, 150 mg at 03/26/23 2003    ASSESSMENT  Severe hypothyroidism  Elevated troponin  Hypertension  Hyperlipidemia  Morbidly obese  Depression  Noncompliance with medications  Gastroesophageal reflux disease    PLAN  CPM  Home medication adjusted  Stress ulcer DVT prophylaxis  Psych input appreciated   Cardiology to follow patient  Supportive care  PT OT  Discussed with nursing staff  Discharge planning    CHRISTINA VAUGHN MD    Copied text in this note has been reviewed and is accurate as of 03/27/23

## 2023-03-27 NOTE — PROGRESS NOTES
"Daily progress note    Primary care physician  Dr. Atkinson    Chief complaint  Doing same with no new complaint    History of present illness  73 white male with history of hypertension hyperlipidemia hypothyroidism osteoarthritis and depression who has been noncompliant with medications presented to [facility] emergency room with generalized weakness.  Patient unable to take care of himself.  Patient has not taken his medication for last several weeks.  Patient denies any headache dizziness chest pain shortness of breath abdominal pain nausea vomiting diarrhea.  Patient work-up in ER revealed severe hypothyroidism who has been noncompliant with medication and also elevated troponin with no chest pain admit for management.  Patient lives by himself and willing to go to nursing home after stabilization.     REVIEW OF SYSTEMS  All systems reviewed and negative except for those discussed in HPI.      PHYSICAL EXAM  Blood pressure 127/73, pulse 61, temperature 98.4 °F (36.9 °C), temperature source Oral, resp. rate 18, height 177.8 cm (70\"), weight (!) 137 kg (302 lb), SpO2 93 %.    Constitutional:       Appearance: Normal appearance. He is obese.   HENT:      Head: Normocephalic and atraumatic.      Mouth/Throat:      Mouth: Mucous membranes are moist.   Eyes:      Extraocular Movements: Extraocular movements intact.      Pupils: Pupils are equal, round, and reactive to light.   Cardiovascular:      Rate and Rhythm: Normal rate and regular rhythm.   Pulmonary:      Effort: Pulmonary effort is normal.      Breath sounds: Normal breath sounds.   Abdominal:      General: There is no distension.      Palpations: Abdomen is soft.      Tenderness: There is no abdominal tenderness.   Musculoskeletal:      Cervical back: Normal range of motion and neck supple.   Neurological:      Mental Status: He is alert and oriented to person, place, and time. Mental status is at baseline.   Psychiatric:         Mood and Affect: " Mood normal.         Behavior: Behavior normal.         Thought Content: Thought content normal.         Judgment: Judgment normal.      LAB RESULTS  Lab Results (last 24 hours)     ** No results found for the last 24 hours. **        Imaging Results (Last 24 Hours)     ** No results found for the last 24 hours. **        ECG 12 Lead         Component  Ref Range & Units 09:23  (3/25/23) 1 d ago  (3/24/23) 1 d ago  (3/24/23)   QT Interval  ms 484 P  550  562    Resulting Agency  ECG  ECG  ECG             HEART RATE= 75  bpm  RR Interval= 800  ms  MN Interval= 185  ms  P Horizontal Axis= 0  deg  P Front Axis= 29  deg  QRSD Interval= 98  ms  QT Interval= 484  ms  QRS Axis= 38  deg  T Wave Axis= 85  deg  - ABNORMAL ECG -  Sinus rhythm  Low voltage, precordial leads  Nonspecific T abnrm, anterolateral leads  Prolonged QT interval             Current Facility-Administered Medications:   •  acetaminophen (TYLENOL) tablet 650 mg, 650 mg, Oral, Q6H PRN, Gregg Solorzano MD, 650 mg at 03/25/23 0911  •  amLODIPine (NORVASC) tablet 10 mg, 10 mg, Oral, Daily, Gregg Solorzano MD, 10 mg at 03/27/23 0846  •  aspirin chewable tablet 81 mg, 81 mg, Oral, Daily, Gregg Solorzano MD, 81 mg at 03/27/23 0846  •  atorvastatin (LIPITOR) tablet 10 mg, 10 mg, Oral, Nightly, Gregg Solorzano MD, 10 mg at 03/26/23 2003  •  [START ON 3/28/2023] DULoxetine (CYMBALTA) DR capsule 40 mg, 40 mg, Oral, Daily, David Vásquez III, MD  •  Influenza Vac High-Dose Quad (FLUZONE HIGH DOSE) injection 0.7 mL, 0.7 mL, Intramuscular, During Hospitalization, Gregg Solorzano MD  •  ipratropium-albuterol (DUO-NEB) nebulizer solution 3 mL, 3 mL, Nebulization, Q4H PRN, Gregg Solorzano MD  •  isosorbide mononitrate (IMDUR) 24 hr tablet 30 mg, 30 mg, Oral, Daily, Gregg Solorzano MD, 30 mg at 03/27/23 0846  •  levothyroxine (SYNTHROID, LEVOTHROID) tablet 150 mcg, 150 mcg, Oral, Q AM, Gregg Solorzano MD, 150 mcg at 03/27/23 0514  •  metoprolol tartrate (LOPRESSOR) tablet  12.5 mg, 12.5 mg, Oral, Q12H, Christina Vaughn MD, 12.5 mg at 03/27/23 0846  •  morphine injection 2 mg, 2 mg, Intravenous, Q4H PRN, Crissy Melendrez MD, 2 mg at 03/27/23 0945  •  nitroglycerin (NITROSTAT) SL tablet 0.4 mg, 0.4 mg, Sublingual, Q5 Min PRN, Hillary Crane APRN, 0.4 mg at 03/25/23 1112  •  pantoprazole (PROTONIX) EC tablet 40 mg, 40 mg, Oral, BID AC, Christina Vaughn MD, 40 mg at 03/27/23 0513  •  sertraline (ZOLOFT) tablet 100 mg, 100 mg, Oral, Daily, Christina Vaughn MD, 100 mg at 03/27/23 0846  •  sodium chloride 0.9 % with KCl 20 mEq/L infusion, 75 mL/hr, Intravenous, Continuous, Christina Vaughn MD, Last Rate: 75 mL/hr at 03/27/23 0515, 75 mL/hr at 03/27/23 0515  •  traZODone (DESYREL) tablet 150 mg, 150 mg, Oral, Nightly, Christina Vaughn MD, 150 mg at 03/26/23 2003    ASSESSMENT  Severe hypothyroidism  Elevated troponin  Hypertension  Hyperlipidemia  Morbidly obese  Depression  Noncompliance with medications  Gastroesophageal reflux disease    PLAN  CPM  Serial cardiac enzymes and EKG  Check 2D echo and stress Cardiolite  Cardiology consult appreciated  Resume home medications  Stress ulcer DVT prophylaxis  Psych to follow patient  Supportive care  PT OT  Discussed with nursing staff  Follow closely further recommendation current hospital course    CHRISTINA VAUGHN MD    Copied text in this note has been reviewed and is accurate as of 03/27/23

## 2023-03-27 NOTE — DISCHARGE PLACEMENT REQUEST
"Nikita Dee (73 y.o. Male)     Date of Birth   1950    Social Security Number       Address   [address] [facility] [address]    Home Phone   [phone]    MRN   [id number]       Gnosticism   [facility]    Marital Status   Single                            Admission Date   3/24/23    Admission Type   Emergency    Admitting Provider   Gregg Solorzano MD    Attending Provider   Gregg Solorzano MD    Department, Room/Bed   [address], [address]/1       Discharge Date       Discharge Disposition       Discharge Destination                               Attending Provider: Gregg Solorzano MD    Allergies: Keflex [Cephalexin]    Isolation: None   Infection: None   Code Status: CPR    Ht: 177.8 cm (70\")   Wt: 137 kg (302 lb)    Admission Cmt: None   Principal Problem: Generalized weakness [R53.1]                 Active Insurance as of 3/24/2023     Primary Coverage     Payor Plan Insurance Group Employer/Plan Group    ANTHEM MEDICARE REPLACEMENT ANTHEM MEDICARE ADVANTAGE [id number]     Payor Plan Address Payor Plan Phone Number Payor Plan Fax Number Effective Dates    [address] [phone]  4/1/2016 - None Entered    [facility] [id number]       Subscriber Name Subscriber Birth Date Member ID       NIKITA DEE 1950 [id number]           Secondary Coverage     Payor Plan Insurance Group Employer/Plan Group    [place]      Payor Plan Address Payor Plan Phone Number Payor Plan Fax Number Effective Dates    [address] [phone]  3/15/2017 - None Entered    [facility] [id number]       Subscriber Name Subscriber Birth Date Member ID       NIKITA DEE 1950 [id number]                 Emergency Contacts      (Rel.) Home Phone Work Phone Mobile Phone    AlirioGaviota (Friend) [phone] -- --              "

## 2023-03-27 NOTE — PLAN OF CARE
Goal Outcome Evaluation:  Plan of Care Reviewed With: patient        Progress: no change  Outcome Evaluation: Pt seen for PT/OT co treatment today to maximize therapeutic benefit. Pt reports of being bedridden for about a year. Pt claims to have no help when asked but notes from earlier, said to have friends helping patient. Pt required ModAX2 with supine to sit and Max assist of 2 with sit to supine. Verbal cues givent. Pt required ModA/Max assist with sitting balance. Pt with posterior lean and had difficulty holding self on EOB without support. Pt feeling dizzy with sitting and throughout treatment. pt requested to lay back down. Will continue to follow pt for d/c needs and to improve mobility.

## 2023-03-27 NOTE — PROGRESS NOTES
"IDENTIFYING INFORMATION: The patient is a 73-year-old white male admitted on [id number] with complaints of generalized weakness and shortness of air.  He is seen by psychiatry related to depression and various social issues    CHIEF COMPLAINT: None given    INFORMANT: Patient and chart    RELIABILITY: Good    HISTORY OF PRESENT ILLNESS: The patient is a 73-year-old white male admitted on 3/24/2023 with complaints of generalized weakness and shortness of air.  He has multiple medical problems including morbid obesity, postpolio syndrome, hypertension, hyperlipidemia, hypothyroidism and osteoarthritis.  The patient reports that he is unable to attend to activities of daily living often soiling himself while in bed.  He had a caregiver but has apparently lost this individual and additionally has learned that he will lose his section 8 housing in the near future.  These have been a source of tremendous distress to him.  He denies prior suicide attempts or gestures and denies current suicidal or homicidal ideation.  He denies prior psychiatric contact but reports that he has been prescribed duloxetine and Zoloft by his primary care physician.  The patient is originally from [place] but moved to the [place] area for \"a woman\".  He is currently  and has little contact with his twin daughters.  He denies abuse of any psychoactive substances.  He complains of occasional poor sleep and loss of appetite.    PAST PSYCHIATRIC HISTORY: As above    PAST MEDICAL HISTORY: See above    MEDICATIONS:   Current Facility-Administered Medications   Medication Dose Route Frequency Provider Last Rate Last Admin   • acetaminophen (TYLENOL) tablet 650 mg  650 mg Oral Q6H PRN Gregg Solorzano MD   650 mg at 03/25/23 0911   • amLODIPine (NORVASC) tablet 10 mg  10 mg Oral Daily Gregg Solorzano MD   10 mg at 03/27/23 0846   • aspirin chewable tablet 81 mg  81 mg Oral Daily Gregg Solorzano MD   81 mg at 03/27/23 0846   • atorvastatin " (LIPITOR) tablet 10 mg  10 mg Oral Nightly Gregg Solorzano MD   10 mg at 03/26/23 2003   • [START ON 3/28/2023] DULoxetine (CYMBALTA) DR capsule 40 mg  40 mg Oral Daily David Vásquez III, MD       • Influenza Vac High-Dose Quad (FLUZONE HIGH DOSE) injection 0.7 mL  0.7 mL Intramuscular During Hospitalization Gregg Solorzano MD       • ipratropium-albuterol (DUO-NEB) nebulizer solution 3 mL  3 mL Nebulization Q4H PRN Gregg Solorzano MD       • isosorbide mononitrate (IMDUR) 24 hr tablet 30 mg  30 mg Oral Daily Gergg Solorzano MD   30 mg at 03/27/23 0846   • levothyroxine (SYNTHROID, LEVOTHROID) tablet 150 mcg  150 mcg Oral Q AM Gregg Solorzano MD   150 mcg at 03/27/23 0514   • metoprolol tartrate (LOPRESSOR) tablet 12.5 mg  12.5 mg Oral Q12H Gregg Solorzano MD   12.5 mg at 03/27/23 0846   • morphine injection 2 mg  2 mg Intravenous Q4H PRN Crissy Melendrez MD   2 mg at 03/27/23 0945   • nitroglycerin (NITROSTAT) SL tablet 0.4 mg  0.4 mg Sublingual Q5 Min PRN Hillary Crane APRN   0.4 mg at 03/25/23 1112   • pantoprazole (PROTONIX) EC tablet 40 mg  40 mg Oral BID AC Gregg Solorzano MD   40 mg at 03/27/23 0513   • sertraline (ZOLOFT) tablet 100 mg  100 mg Oral Daily Gregg Solorzano MD   100 mg at 03/27/23 0846   • sodium chloride 0.9 % with KCl 20 mEq/L infusion  75 mL/hr Intravenous Continuous Gregg Solorzano MD 75 mL/hr at 03/27/23 0515 75 mL/hr at 03/27/23 0515   • traZODone (DESYREL) tablet 150 mg  150 mg Oral Nightly Gregg Solorzano MD   150 mg at 03/26/23 2003         ALLERGIES: Keflex    FAMILY HISTORY: Noncontributory    SOCIAL HISTORY: The patient lives alone and is .  He reports that he is about to lose his section 8 housing.  He denies abuse of any psychoactive substances.  He is a native of [place].    MENTAL STATUS EXAM: The patient is a morbidly obese white male appearing his stated age.  He has no apparent physical distress at the time of examination.  He is awake alert and oriented all  spheres.  His mood is dysphoric his affect constricted.  Speech is well coherent.  There are no deficits memory or cognition noted.  Intelligence is judged to be in the average range based on fund of knowledge, the patient is cooperative with interview.  He is currently reporting no suicidal or homicidal ideation or psychotic features.  Judgement and insight are intact.    ASSETS/LIABILITIES: Motivation for change/multiple health and social issues    DIAGNOSTIC IMPRESSION: Major depressive disorder recurrent moderate, medical problems described previously    PLAN: I have increased the patient's Cymbalta from 20 to 40 mg daily and will consider further upward titration to at least 60 mg in the next couple of weeks.  I will leave Zoloft at its current dose as well as trazodone at its current dose.  Much of the patient's depression seems to be situational related to his housing and care situation.  I would suggest that  engaged the patient with regards to possible solutions to these issues.  I will continue to follow with you.

## 2023-03-27 NOTE — THERAPY EVALUATION
Patient Name: Nicanor TAN Ar  : 1950    MRN: [id number]                              Today's Date: 3/27/2023       Admit Date: 3/24/2023    Visit Dx:     ICD-10-CM ICD-9-CM   1. Generalized weakness  R53.1 780.79   2. Elevated TSH  R79.89 794.5   3. Elevated troponin  R77.8 790.6     Patient Active Problem List   Diagnosis   • History of colon polyps   • Acute pancreatitis due to calculus of common bile duct   • Abdominal pain, generalized   • Closed left tibial fracture   • Closed bimalleolar fracture of left ankle   • Left tibial fracture   • Closed fracture of upper end of left fibula   • Immobility syndrome   • HTN (hypertension)   • Generalized abdominal pain   • Other constipation   • Diarrhea   • Hx of adenomatous colonic polyps   • Generalized weakness     Past Medical History:   Diagnosis Date   • Arthritis    • Colon cancer (HCC) 2014    tubulovillous adenomas with adenocarcinoma in situ s/p right open colectomy   • Colon polyps 2014    1) cecal polyps: fragments of adenomatous polyp with low grade-dysplasia 2) ascending colon mass bx: fragments of tubulovillous adenoma w/ low grade dysplasia 3) transverse colon polyp: fragments of adenomatous polyp with low grade dysplasia 4) sigmoid polyp: fragments of adenomatous polyp with low grade dysplasia   • Depression    • Gout    • Hearing loss    • Hyperlipemia    • Hypertension    • Hypothyroidism    • Kidney stones    • Polio    • Rheumatoid arthritis (HCC)    • Sleep apnea      Past Surgical History:   Procedure Laterality Date   • CHOLECYSTECTOMY WITH INTRAOPERATIVE CHOLANGIOGRAM N/A 11/10/2017    Procedure: CHOLECYSTECTOMY LAPAROSCOPIC INTRAOPERATIVE CHOLANGIOGRAM;  Surgeon: Elian Carnes MD;  Location: [facility];  Service:    • COLECTOMY PARTIAL / TOTAL Right 2014    Open right colectomy-Dr. Elian Carnes   • COLONOSCOPY N/A 3/15/2017    Procedure: COLONOSCOPY TO ANASTAMOSIS AND TI WITH COLD SNARE POLYPECTOMY ;  Surgeon:  Elian Carnes MD;  Location: [facility] ENDOSCOPY;  Service:    • COLONOSCOPY N/A 08/06/2014    1 cm cecal polyp, 1 cm transverse polyp, 1 cm sigmoid colon polyp, mass in the ascending colon occupying 1/3 of the colonic circumference, mass proximal to the transverse colon occupying 1/4 colonic circumference-Dr. Elian Carnes   • COLONOSCOPY N/A 5/1/2019    Procedure: COLONOSCOPY to ILEOCOLIC ANASTAMOSIS WITH HOT SNARE/COLD BX POLYPECTOMY;  Surgeon: Elian Carnes MD;  Location: [facility] ENDOSCOPY;  Service: General   • FOOT SURGERY Right 2008, 2009   • GASTRIC BANDING N/A 12/28/2009   • NOSE SURGERY N/A 1989   • ORIF TIBIA/FIBULA FRACTURES Left 12/17/2017    Procedure: TIBIA/FIBULA OPEN REDUCTION INTERNAL FIXATION;  Surgeon: Lai Acevedo Jr., MD;  Location: [facility] MAIN OR;  Service:    • ORIF TIBIA/FIBULA FRACTURES Right    • TOTAL KNEE ARTHROPLASTY Bilateral 1/2002, 6/2002 1/2002-left knee 6/2002[facility]      General Information     Row Name 03/27/23 1553          OT Time and Intention    Document Type evaluation  -     Mode of Treatment physical therapy;co-treatment;occupational therapy  -     Row Name 03/27/23 3736          General Information    Patient Profile Reviewed yes  -KA     Prior Level of Function --  Pt reports being bedridden at home and about a year since he has stood up. Pt also stated he lived alone and no one assisted him with ADLs. Told therapist he did not use a bedpan. Per chart pt had a friend assisting him with care that recently quit.  -KA     Existing Precautions/Restrictions fall  -     Row Name 03/27/23 1557          Living Environment    People in Home alone  -     Row Name 03/27/23 4209          Cognition    Orientation Status (Cognition) oriented to;person;place  -     Row Name 03/27/23 9773          Safety Issues, Functional Mobility    Safety Issues Affecting Function (Mobility) judgment;insight into  deficits/self-awareness;problem-solving;sequencing abilities  -     Impairments Affecting Function (Mobility) balance;postural/trunk control;endurance/activity tolerance;strength;cognition  -           User Key  (r) = Recorded By, (t) = Taken By, (c) = Cosigned By    Initials Name Provider Type    Sammie Humphreys OT Occupational Therapist                 Mobility/ADL's     Row Name 03/27/23 [facility]6          Bed Mobility    Supine-Sit [place] (Bed Mobility) moderate assist (50% patient effort);2 person assist;verbal cues;nonverbal cues (demo/gesture)  -     Sit-Supine [place] (Bed Mobility) maximum assist (25% patient effort);2 person assist  -     Assistive Device (Bed Mobility) bed rails;head of bed elevated;draw sheet  -     Comment, (Bed Mobility) dizzy seated EOB  -     Row Name 03/27/23 [facility]          Transfers    Comment, (Transfers) Poor sitting balance. Did not attempt STS  -[facility] Name 03/27/23 [facility]          Functional Mobility    Functional Mobility- Ind. Level not appropriate to assess  -     Row Name 03/27/23 [facility]          Activities of Daily Living    BADL Assessment/Intervention lower body dressing;toileting;grooming  -[facility] Name 03/27/23 [facility]          Lower Body Dressing Assessment/Training    [place] Level (Lower Body Dressing) lower body dressing skills;don;doff;socks;dependent (less than 25% patient effort)  -     Position (Lower Body Dressing) supine  -[facility] Name 03/27/23 [facility]          Toileting Assessment/Training    [place] Level (Toileting) toileting skills;perform perineal hygiene;change pad/brief;adjust/manage clothing;dependent (less than 25% patient effort)  -     Position (Toileting) supine  -[facility] Name 03/27/23 [facility]          Grooming Assessment/Training    Comment, (Grooming) Anticipate SBA sitting up in bed, BUE WFL  -           User Key  (r) = Recorded By, (t) = Taken By, (c) = Cosigned By    Initials Name Provider Type    YAYO  Sammie Celestin OT Occupational Therapist               Obj/Interventions     Row Name 03/27/23 1557          Range of Motion Comprehensive    General Range of Motion bilateral lower extremity ROM WFL  -KA     Comment, General Range of Motion --  RUE increased tone from prev stroke. no AROM. LUE WFL  -KA     Row Name 03/27/23 1557          Strength Comprehensive (MMT)    General Manual Muscle Testing (MMT) Assessment upper extremity strength deficits identified  -KA     Comment, General Manual Muscle Testing (MMT) Assessment BUE 3+/5  -KA     Row Name 03/27/23 1557          Balance    Static Sitting Balance moderate assist;maximum assist  -KA     Position, Sitting Balance sitting edge of bed  -KA     Comment, Balance Posterior lean and difficulty following commands seated EOB  -KA           User Key  (r) = Recorded By, (t) = Taken By, (c) = Cosigned By    Initials Name Provider Type    Sammie Humphreys OT Occupational Therapist               Goals/Plan     Row Name 03/27/23 1608          Bed Mobility Goal 1 (OT)    Activity/Assistive Device (Bed Mobility Goal 1, OT) bed mobility activities, all  -KA     [place] Level/Cues Needed (Bed Mobility Goal 1, OT) moderate assist (50-74% patient effort)  -KA     Time Frame (Bed Mobility Goal 1, OT) 2 weeks;short term goal (STG)  -KA     Progress/Outcomes (Bed Mobility Goal 1, OT) goal ongoing  -     Row Name 03/27/23 1609          Bathing Goal 1 (OT)    Activity/Device (Bathing Goal 1, OT) bathing skills, all  -KA     [place] Level/Cues Needed (Bathing Goal 1, OT) moderate assist (50-74% patient effort)  -KA     Time Frame (Bathing Goal 1, OT) 2 weeks;short term goal (STG)  -KA     Progress/Outcomes (Bathing Goal 1, OT) goal ongoing  -     Row Name 03/27/23 1609          Grooming Goal 1 (OT)    Activity/Device (Grooming Goal 1, OT) grooming skills, all  -KA     [place] (Grooming Goal 1, OT) minimum assist (75% or more patient effort)  -KA     Time  Frame (Grooming Goal 1, OT) short term goal (STG);2 weeks  -KA     Progress/Outcome (Grooming Goal 1, OT) goal ongoing  -     Row Name 03/27/23 1605          Strength Goal 1 (OT)    Strength Goal 1 (OT) pt to demo understanding of HEP in order to improve overall strength and endurance.  -     Time Frame (Strength Goal 1, OT) short term goal (STG);2 weeks  -KA     Progress/Outcome (Strength Goal 1, OT) goal ongoing  -     Row Name 03/27/23 1601          Therapy Assessment/Plan (OT)    Planned Therapy Interventions (OT) activity tolerance training;functional balance retraining;occupation/activity based interventions;ROM/therapeutic exercise;strengthening exercise;transfer/mobility retraining;patient/caregiver education/training;BADL retraining  -           User Key  (r) = Recorded By, (t) = Taken By, (c) = Cosigned By    Initials Name Provider Type    Sammie Humphreys, OT Occupational Therapist               Clinical Impression     Row Name 03/27/23 160          Pain Assessment    Pretreatment Pain Rating 0/10 - no pain  -     Posttreatment Pain Rating 0/10 - no pain  -     Row Name 03/27/23 1601          Plan of Care Review    Plan of Care Reviewed With patient  -     Outcome Evaluation Pt seen for OT eval this PM. Admitted with generalized weakness and inability to care for self at home. When asked pt reports he lives alone and has no assistance at home. Earlier notes state pt had a friend assisting him and recently quit. Pt also denies using a bedpan and states he hasn't stood in about a year. Required mod-max A x2 to transition to sit EOB. Mod-max A for sit balance EOB. Pt with difficulty following commands sitting EOB to hold self up. Pt demo significant posterior lean. Dep to don socks, LBD and toileting tasks. Pt to benefit from skilled OT to address deficits and maximize independence with ADLs.  -     Row Name 03/27/23 1601          Therapy Assessment/Plan (OT)    Criteria for Skilled  Therapeutic Interventions Met (OT) yes  -KA     Therapy Frequency (OT) 3 times/wk  -     Row Name 03/27/23 1605          Therapy Plan Review/Discharge Plan (OT)    Anticipated Discharge Disposition (OT) skilled nursing facility  -     Row Name 03/27/23 1605          Vital Signs    Pre Patient Position Supine  -KA     Intra Patient Position Sitting  -KA     Post Patient Position Supine  -     Row Name 03/27/23 1605          Positioning and Restraints    Pre-Treatment Position in bed  -KA     Post Treatment Position bed  -KA     In Bed supine;call light within reach;encouraged to call for assist;exit alarm on  -KA           User Key  (r) = Recorded By, (t) = Taken By, (c) = Cosigned By    Initials Name Provider Type    Sammie Humphreys, BRANDY Occupational Therapist               Outcome Measures     Row Name 03/27/23 1610          How much help from another is currently needed...    Putting on and taking off regular lower body clothing? 1  -KA     Bathing (including washing, rinsing, and drying) 1  -KA     Toileting (which includes using toilet bed pan or urinal) 1  -KA     Putting on and taking off regular upper body clothing 2  -KA     Taking care of personal grooming (such as brushing teeth) 2  -KA     Eating meals 2  -KA     AM-PAC 6 Clicks Score (OT) 9  -KA     Row Name 03/27/23 1541          How much help from another person do you currently need...    Turning from your back to your side while in flat bed without using bedrails? 2  -EB     Moving from lying on back to sitting on the side of a flat bed without bedrails? 2  -EB     Moving to and from a bed to a chair (including a wheelchair)? 1  -EB     Standing up from a chair using your arms (e.g., wheelchair, bedside chair)? 1  -EB     Climbing 3-5 steps with a railing? 1  -EB     To walk in hospital room? 1  -EB     AM-PAC 6 Clicks Score (PT) 8  -EB     Highest level of mobility 3 --> Sat at edge of bed  -EB     Row Name 03/27/23 1610          Functional  Assessment    Outcome Measure Options AM-PAC 6 Clicks Daily Activity (OT)  -           User Key  (r) = Recorded By, (t) = Taken By, (c) = Cosigned By    Initials Name Provider Type    Mercedes Arreola PTA Physical Therapist Assistant    Sammie Humphreys OT Occupational Therapist                Occupational Therapy Education     Title: PT OT SLP Therapies (Done)     Topic: Occupational Therapy (Done)     Point: ADL training (Done)     Description:   Instruct learner(s) on proper safety adaptation and remediation techniques during self care or transfers.   Instruct in proper use of assistive devices.              Learning Progress Summary           Patient Acceptance, E, VU,DU by YAYO at 3/27/2023 1611                   Point: Home exercise program (Done)     Description:   Instruct learner(s) on appropriate technique for monitoring, assisting and/or progressing therapeutic exercises/activities.              Learning Progress Summary           Patient Acceptance, E, VU,DU by YAYO at 3/27/2023 1611                               User Key     Initials Effective Dates Name Provider Type Discipline    YAYO 09/22/22 -  Sammie Celestin OT Occupational Therapist OT              OT Recommendation and Plan  Planned Therapy Interventions (OT): activity tolerance training, functional balance retraining, occupation/activity based interventions, ROM/therapeutic exercise, strengthening exercise, transfer/mobility retraining, patient/caregiver education/training, BADL retraining  Therapy Frequency (OT): 3 times/wk  Plan of Care Review  Plan of Care Reviewed With: patient  Outcome Evaluation: Pt seen for OT eval this PM. Admitted with generalized weakness and inability to care for self at home. When asked pt reports he lives alone and has no assistance at home. Earlier notes state pt had a friend assisting him and recently quit. Pt also denies using a bedpan and states he hasn't stood in about a year. Required mod-max A x2 to  transition to sit EOB. Mod-max A for sit balance EOB. Pt with difficulty following commands sitting EOB to hold self up. Pt demo significant posterior lean. Dep to don socks, LBD and toileting tasks. Pt to benefit from skilled OT to address deficits and maximize independence with ADLs.     Time Calculation:    Time Calculation- OT     Row Name 03/27/23 1611             Time Calculation- OT    OT Start Time 1349  -KA      OT Stop Time 1405  -KA      OT Time Calculation (min) 16 min  -KA      Total Timed Code Minutes- OT 8 minute(s)  -KA      OT Received On 03/27/23  -KA      OT - Next Appointment 03/29/23  -KA      OT Goal Re-Cert Due Date 04/10/23  -KA         Timed Charges    23273 - OT Therapeutic Activity Minutes 8  -KA         Total Minutes    Timed Charges Total Minutes 8  -KA       Total Minutes 8  -KA            User Key  (r) = Recorded By, (t) = Taken By, (c) = Cosigned By    Initials Name Provider Type    KA Sammie Celestin OT Occupational Therapist              Therapy Charges for Today     Code Description Service Date Service Provider Modifiers Qty    [id number]  OT THERAPEUTIC ACT EA 15 MIN 3/27/2023 Sammie Celestin OT GO 1    [id number]  OT EVAL MOD COMPLEXITY 2 3/27/2023 Sammie Celestin OT GO 1               Sammie Celestin OT  3/27/2023

## 2023-03-27 NOTE — NURSING NOTE
"Access center follow up note, regarding depression.  Patient rates depression as 3 out of 10 and anxiety 5 out of 10.  Patient states he slept fairly well last night.  Patient has a flat affect and slow speech during evaluation.  He does admit to feeling \"kind of down \" and states that he needs some help because he has a reason to be depressed since he may be soon evicted from his apartment.  He states he just has so much going on.    Dr. Vásquez to see patient today.    Access will continue to follow.    "

## 2023-03-27 NOTE — PLAN OF CARE
Goal Outcome Evaluation:  Plan of Care Reviewed With: patient        Progress: no change  Outcome Evaluation: Patient alert and oriented X4. On RA. IVFs continued with K+. Patient continues to c/o pain in back and bilateral lower extremities, controlled with Morphine. Able to use urinal with assist. VSS.

## 2023-03-27 NOTE — PLAN OF CARE
Goal Outcome Evaluation:  Plan of Care Reviewed With: patient           Outcome Evaluation: Pt seen for OT gladis this PM. Admitted with generalized weakness and inability to care for self at home. When asked pt reports he lives alone and has no assistance at home. Earlier notes state pt had a friend assisting him and recently quit. Pt also denies using a bedpan and states he hasn't stood in about a year. Required mod-max A x2 to transition to sit EOB. Mod-max A for sit balance EOB. Pt with difficulty following commands sitting EOB to hold self up. Pt demo significant posterior lean. Dep to don socks, LBD and toileting tasks. Pt to benefit from skilled OT to address deficits and maximize independence with ADLs.

## 2023-03-28 LAB
ANION GAP SERPL CALCULATED.3IONS-SCNC: 9 MMOL/L (ref 5–15)
BASOPHILS # BLD AUTO: 0.03 10*3/MM3 (ref 0–0.2)
BASOPHILS NFR BLD AUTO: 0.7 % (ref 0–1.5)
BUN SERPL-MCNC: 16 MG/DL (ref 8–23)
BUN/CREAT SERPL: 17 (ref 7–25)
CALCIUM SPEC-SCNC: 8.7 MG/DL (ref 8.6–10.5)
CHLORIDE SERPL-SCNC: 106 MMOL/L (ref 98–107)
CO2 SERPL-SCNC: 25 MMOL/L (ref 22–29)
CREAT SERPL-MCNC: 0.94 MG/DL (ref 0.76–1.27)
DEPRECATED RDW RBC AUTO: 43.2 FL (ref 37–54)
EGFRCR SERPLBLD CKD-EPI 2021: 85.6 ML/MIN/1.73
EOSINOPHIL # BLD AUTO: 0.12 10*3/MM3 (ref 0–0.4)
EOSINOPHIL NFR BLD AUTO: 2.8 % (ref 0.3–6.2)
ERYTHROCYTE [DISTWIDTH] IN BLOOD BY AUTOMATED COUNT: 13.3 % (ref 12.3–15.4)
GLUCOSE SERPL-MCNC: 86 MG/DL (ref 65–99)
HCT VFR BLD AUTO: 36.6 % (ref 37.5–51)
HGB BLD-MCNC: 12.3 G/DL (ref 13–17.7)
LYMPHOCYTES # BLD AUTO: 1.33 10*3/MM3 (ref 0.7–3.1)
LYMPHOCYTES NFR BLD AUTO: 31.1 % (ref 19.6–45.3)
MCH RBC QN AUTO: 30.1 PG (ref 26.6–33)
MCHC RBC AUTO-ENTMCNC: 33.6 G/DL (ref 31.5–35.7)
MCV RBC AUTO: 89.5 FL (ref 79–97)
MONOCYTES # BLD AUTO: 0.4 10*3/MM3 (ref 0.1–0.9)
MONOCYTES NFR BLD AUTO: 9.3 % (ref 5–12)
NEUTROPHILS NFR BLD AUTO: 2.37 10*3/MM3 (ref 1.7–7)
NEUTROPHILS NFR BLD AUTO: 55.4 % (ref 42.7–76)
PLATELET # BLD AUTO: 114 10*3/MM3 (ref 140–450)
PMV BLD AUTO: 10.9 FL (ref 6–12)
POTASSIUM SERPL-SCNC: 3.7 MMOL/L (ref 3.5–5.2)
RBC # BLD AUTO: 4.09 10*6/MM3 (ref 4.14–5.8)
SODIUM SERPL-SCNC: 140 MMOL/L (ref 136–145)
WBC NRBC COR # BLD: 4.28 10*3/MM3 (ref 3.4–10.8)

## 2023-03-28 PROCEDURE — 25010000002 MORPHINE PER 10 MG: Performed by: INTERNAL MEDICINE

## 2023-03-28 PROCEDURE — 96376 TX/PRO/DX INJ SAME DRUG ADON: CPT

## 2023-03-28 PROCEDURE — 80048 BASIC METABOLIC PNL TOTAL CA: CPT | Performed by: HOSPITALIST

## 2023-03-28 PROCEDURE — 85025 COMPLETE CBC W/AUTO DIFF WBC: CPT | Performed by: HOSPITALIST

## 2023-03-28 RX ADMIN — AMLODIPINE BESYLATE 5 MG: 5 TABLET ORAL at 09:15

## 2023-03-28 RX ADMIN — PANTOPRAZOLE SODIUM 40 MG: 40 TABLET, DELAYED RELEASE ORAL at 05:36

## 2023-03-28 RX ADMIN — ISOSORBIDE MONONITRATE 30 MG: 30 TABLET, EXTENDED RELEASE ORAL at 09:15

## 2023-03-28 RX ADMIN — PANTOPRAZOLE SODIUM 40 MG: 40 TABLET, DELAYED RELEASE ORAL at 17:00

## 2023-03-28 RX ADMIN — MORPHINE SULFATE 2 MG: 2 INJECTION, SOLUTION INTRAMUSCULAR; INTRAVENOUS at 13:02

## 2023-03-28 RX ADMIN — SERTRALINE 100 MG: 100 TABLET, FILM COATED ORAL at 09:15

## 2023-03-28 RX ADMIN — METOPROLOL TARTRATE 12.5 MG: 25 TABLET, FILM COATED ORAL at 20:10

## 2023-03-28 RX ADMIN — MORPHINE SULFATE 2 MG: 2 INJECTION, SOLUTION INTRAMUSCULAR; INTRAVENOUS at 19:27

## 2023-03-28 RX ADMIN — MORPHINE SULFATE 2 MG: 2 INJECTION, SOLUTION INTRAMUSCULAR; INTRAVENOUS at 05:42

## 2023-03-28 RX ADMIN — ATORVASTATIN CALCIUM 10 MG: 20 TABLET, FILM COATED ORAL at 20:10

## 2023-03-28 RX ADMIN — ASPIRIN 81 MG: 81 TABLET, CHEWABLE ORAL at 09:14

## 2023-03-28 RX ADMIN — LEVOTHYROXINE SODIUM 150 MCG: 0.15 TABLET ORAL at 05:36

## 2023-03-28 RX ADMIN — TRAZODONE HYDROCHLORIDE 150 MG: 100 TABLET ORAL at 20:10

## 2023-03-28 RX ADMIN — METOPROLOL TARTRATE 12.5 MG: 25 TABLET, FILM COATED ORAL at 09:14

## 2023-03-28 RX ADMIN — DULOXETINE HYDROCHLORIDE 40 MG: 20 CAPSULE, DELAYED RELEASE ORAL at 09:15

## 2023-03-28 NOTE — PROGRESS NOTES
The patient seems marginally brighter when seen today.  He tolerated the increased dose of Cymbalta without complaint and offers no new complaints when seen today.  I have discussed with the patient the fact that he is probably going to be going to physical rehabilitation upon discharge and this will buy a considerable amount of time to resolve the social issues about which he is currently very concerned.

## 2023-03-28 NOTE — PLAN OF CARE
Goal Outcome Evaluation:  Plan of Care Reviewed With: patient        Progress: no change  Outcome Evaluation: patient alert and oriented x4. RA. continues to have back and bilateral lower extremity pain, controlled with morphine. urinal and inc of stool. VSS.

## 2023-03-28 NOTE — PROGRESS NOTES
"[facility] Center f/u d/t depression. Chart reviewed, spoke w/ RN and met w/ pt. RN reports pt \"pleasant\" today. Pt RIB upon entry, A&Ox4. Pt rates anxiety as a \"7\" and depression as a \"8\" today. Pt denies SI/HI, hallucinations or wish to be dead. Pt reports, \"getting better physically will help me get better mentally, but I live in a lonely world\". Pt reports unsure r/t wanting resources at this time. Provided psychoeducation r/t benefits of counseling. Pt acknowledged. Psychiatry following, yesterday increased Cymbalta to 40mg, received first dose today. Psychiatry following.   ACCESS following.   "

## 2023-03-28 NOTE — PROGRESS NOTES
[facility]  Cardiology Progress Note    Patient Identification:  Name: Nicanor Haley  Age: 73 y.o.  Sex: male  :  1950  MRN: [id number]                 Follow Up / Chief Complaint: Chest pain    Interval History: Stress test.  Denies shortness of breath.         Objective:    Past Medical History:  Past Medical History:   Diagnosis Date   • Arthritis    • Colon cancer (HCC)     tubulovillous adenomas with adenocarcinoma in situ s/p right open colectomy   • Colon polyps 2014    1) cecal polyps: fragments of adenomatous polyp with low grade-dysplasia 2) ascending colon mass bx: fragments of tubulovillous adenoma w/ low grade dysplasia 3) transverse colon polyp: fragments of adenomatous polyp with low grade dysplasia 4) sigmoid polyp: fragments of adenomatous polyp with low grade dysplasia   • Depression    • Gout    • Hearing loss    • Hyperlipemia    • Hypertension    • Hypothyroidism    • Kidney stones    • Polio    • Rheumatoid arthritis (HCC)    • Sleep apnea      Past Surgical History:  Past Surgical History:   Procedure Laterality Date   • CHOLECYSTECTOMY WITH INTRAOPERATIVE CHOLANGIOGRAM N/A 11/10/2017    Procedure: CHOLECYSTECTOMY LAPAROSCOPIC INTRAOPERATIVE CHOLANGIOGRAM;  Surgeon: Elian Carnes MD;  Location: [facility] OR;  Service:    • COLECTOMY PARTIAL / TOTAL Right 2014    Open right colectomy-Dr. Elian Carnes   • COLONOSCOPY N/A 3/15/2017    Procedure: COLONOSCOPY TO ANASTAMOSIS AND TI WITH COLD SNARE POLYPECTOMY ;  Surgeon: Elian Carnes MD;  Location: [facility] ENDOSCOPY;  Service:    • COLONOSCOPY N/A 2014    1 cm cecal polyp, 1 cm transverse polyp, 1 cm sigmoid colon polyp, mass in the ascending colon occupying 1/3 of the colonic circumference, mass proximal to the transverse colon occupying 1/4 colonic circumference-Dr. Elian Carnes   • COLONOSCOPY N/A 2019    Procedure: COLONOSCOPY to ILEOCOLIC ANASTAMOSIS WITH HOT SNARE/COLD BX  POLYPECTOMY;  Surgeon: Elian Carnes MD;  Location: [facility] ENDOSCOPY;  Service: General   • FOOT SURGERY Right 2008, 2009   • GASTRIC BANDING N/A 12/28/2009   • NOSE SURGERY N/A 1989   • ORIF TIBIA/FIBULA FRACTURES Left 12/17/2017    Procedure: TIBIA/FIBULA OPEN REDUCTION INTERNAL FIXATION;  Surgeon: Lai Acevedo Jr., MD;  Location: [facility] MAIN OR;  Service:    • ORIF TIBIA/FIBULA FRACTURES Right    • TOTAL KNEE ARTHROPLASTY Bilateral 1/2002, 6/2002 1/2002-left knee 6[facility]        Social History:   Social History     Tobacco Use   • Smoking status: Never   • Smokeless tobacco: Never   Substance Use Topics   • Alcohol use: No      Family History:  Family History   Problem Relation Age of Onset   • Lung cancer Mother    • Hypertension Father    • Diabetes Sister    • Hypertension Brother    • Hypertension Brother    • Lung cancer Brother           Allergies:  Allergies   Allergen Reactions   • Keflex [Cephalexin] Mental Status Change     Pt reports that it makes him violent (12/17/17)      Scheduled Meds:  amLODIPine, 5 mg, Daily  aspirin, 81 mg, Daily  atorvastatin, 10 mg, Nightly  DULoxetine, 40 mg, Daily  isosorbide mononitrate, 30 mg, Q24H  levothyroxine, 150 mcg, Q AM  metoprolol tartrate, 12.5 mg, Q12H  pantoprazole, 40 mg, BID AC  sertraline, 100 mg, Daily  traZODone, 150 mg, Nightly            INTAKE AND OUTPUT:    Intake/Output Summary (Last 24 hours) at 3/28/2023 1056  Last data filed at 3/28/2023 0915  Gross per 24 hour   Intake 690 ml   Output 400 ml   Net 290 ml       Review of Systems:   GI: Denies nausea vomiting  Cardiac: Still have chest pain which could be muscular  Pulmonary: Denies shortness of breath    Constitutional:  Temp:  [98 °F (36.7 °C)-98.9 °F (37.2 °C)] 98.2 °F (36.8 °C)  Heart Rate:  [59-76] 59  Resp:  [18-20] 20  BP: (114-141)/(68-76) 141/76    Physical Exam:  General:  Appears in no acute distress, resting in bed  Eyes: eom normal no  conjunctival drainage  HEENT:  No JVD. Thyroid not visibly enlarged. No mucosal pallor or cyanosis  Respiratory: Respirations regular and unlabored at rest. BBS with good air entry in all fields. No crackles, rubs or wheezes auscultated  Cardiovascular: S1S2 Regular rate and rhythm. No murmur, rub or gallop. No carotid bruits. DP/PT pulses     . No pretibial pitting edema  Gastrointestinal: Abdomen soft, flat, non tender. Bowel sounds present. No hepatosplenomegaly. No ascites  Skin:   Skin warm and dry to touch. No rashes    Neuro: AAO x3 CN II-XII grossly intact  Psych: Mood and affect normal, pleasant and cooperative          I reviewed the patient's new clinical results, and personally reviewed and interpreted the patient's ECG and telemetry data from the last 24 hours    Cardiographics  Telemetry: Sinus rhythm    ECG:   Interpretation Summary       •  Left ventricular ejection fraction appears to be 66 - 70%.  •  Left ventricular diastolic function was normal.  •  Estimated right ventricular systolic pressure from tricuspid regurgitation is normal (<35 mmHg).     Interpretation Summary       •  Findings consistent with a normal ECG stress test.  •  Left ventricular ejection fraction is normal (Calculated EF = 70%).  •  Myocardial perfusion imaging indicates a normal myocardial perfusion study with no evidence of ischemia.  •  Impressions are consistent with a low risk study.    Echocardiogram:     Lab Review   Results from last 7 days   Lab Units 03/26/23  0526 03/25/23  0815 03/25/23  0613   HSTROP T ng/L 53* 56* 57*     Results from last 7 days   Lab Units 03/25/23  0815   MAGNESIUM mg/dL 2.1     Results from last 7 days   Lab Units 03/28/23  0629   SODIUM mmol/L 140   POTASSIUM mmol/L 3.7   BUN mg/dL 16   CREATININE mg/dL 0.94   CALCIUM mg/dL 8.7     @[id number]@  Results from last 7 days   Lab Units 03/28/23  0629 03/26/23  0526 03/25/23  0613   WBC 10*3/mm3 4.28 4.72 5.51   HEMOGLOBIN g/dL 12.3* 11.5*  "12.5*   HEMATOCRIT % 36.6* 35.0* 38.6   PLATELETS 10*3/mm3 114* 116* 132*            The following medical decision was discussed in detail with Dr. Melendrez        Assessment:  Musculoskeletal chest pain worse with pressure  Borderline elevated troponin  Abnormal EKG  Obesity  Hypertension      Plan:  SR on the monitor and no events overnight.   No discomfort today. It appears to be muscular.  Added imdur yesterday. Blood pressure stable.    Discussed with nurse.    It was explained to the patient that stress testing carries 85% specificity/sensitivity, and does not rule out future cardiac event.    He will follow up with REZA Mcgowan on April 21 at 9:15 AM at the [facility] office.       )3/28/2023  REZA Calhoun      EMR Shahidon/Transcription:   \"Dictated utilizing Dragon dictation\".     "

## 2023-03-28 NOTE — CASE MANAGEMENT/SOCIAL WORK
Continued Stay Note  [facility]     Patient Name: Nicanor Haley  MRN: [id number]  Today's Date: 3/28/2023    Admit Date: 3/24/2023    Plan: Referral to [facility]   Discharge Plan     Row Name 03/28/23 1018       Plan    Plan Referral to [facility]    Patient/Family in Agreement with Plan yes    Plan Comments Spoke with patient at bedside, patient is agreeable to referral to [facility]/ [place] [phone]. Patient states that he will need long term placement at discharge. Patient will need stretcher at discharge. 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               Discharge Codes    No documentation.               Expected Discharge Date and Time     Expected Discharge Date Expected Discharge Time    Mar 29, 2023             Libertad Moore RN

## 2023-03-28 NOTE — PROGRESS NOTES
"Daily progress note    Primary care physician  Dr. Atkinson    Chief complaint  Doing same with no new complaint and denies any chest pain shortness of breath palpitation.    History of present illness  73 white male with history of hypertension hyperlipidemia hypothyroidism osteoarthritis and depression who has been noncompliant with medications presented to [facility] emergency room with generalized weakness.  Patient unable to take care of himself.  Patient has not taken his medication for last several weeks.  Patient denies any headache dizziness chest pain shortness of breath abdominal pain nausea vomiting diarrhea.  Patient work-up in ER revealed severe hypothyroidism who has been noncompliant with medication and also elevated troponin with no chest pain admit for management.  Patient lives by himself and willing to go to nursing home after stabilization.     REVIEW OF SYSTEMS  Unremarkable except weakness     PHYSICAL EXAM  Blood pressure 119/66, pulse 65, temperature 98 °F (36.7 °C), temperature source Oral, resp. rate 18, height 177.8 cm (70\"), weight (!) 137 kg (302 lb), SpO2 93 %.    Constitutional:       Appearance: Normal appearance. He is obese.   HENT:      Head: Normocephalic and atraumatic.      Mouth/Throat:      Mouth: Mucous membranes are moist.   Eyes:      Extraocular Movements: Extraocular movements intact.      Pupils: Pupils are equal, round, and reactive to light.   Cardiovascular:      Rate and Rhythm: Normal rate and regular rhythm.   Pulmonary:      Effort: Pulmonary effort is normal.      Breath sounds: Normal breath sounds.   Abdominal:      General: There is no distension.      Palpations: Abdomen is soft.      Tenderness: There is no abdominal tenderness.   Musculoskeletal:      Cervical back: Normal range of motion and neck supple.   Neurological:      Mental Status: He is alert and oriented to person, place, and time. Mental status is at baseline.   Psychiatric:         Mood " and Affect: Mood normal.         Behavior: Behavior normal.         Thought Content: Thought content normal.         Judgment: Judgment normal.      LAB RESULTS  Lab Results (last 24 hours)     Procedure Component Value Units Date/Time    CBC & Differential [[id number]]  (Abnormal) Collected: 03/28/23 0629    Specimen: Blood Updated: 03/28/23 0830    Narrative:      The following orders were created for panel order CBC & Differential.  Procedure                               Abnormality         Status                     ---------                               -----------         ------                     CBC Auto Differential[[id number]]        Abnormal            Final result                 Please view results for these tests on the individual orders.    CBC Auto Differential [[id number]]  (Abnormal) Collected: 03/28/23 0629    Specimen: Blood Updated: 03/28/23 0830     WBC 4.28 10*3/mm3      RBC 4.09 10*6/mm3      Hemoglobin 12.3 g/dL      Hematocrit 36.6 %      MCV 89.5 fL      MCH 30.1 pg      MCHC 33.6 g/dL      RDW 13.3 %      RDW-SD 43.2 fl      MPV 10.9 fL      Platelets 114 10*3/mm3      Neutrophil % 55.4 %      Lymphocyte % 31.1 %      Monocyte % 9.3 %      Eosinophil % 2.8 %      Basophil % 0.7 %      Neutrophils, Absolute 2.37 10*3/mm3      Lymphocytes, Absolute 1.33 10*3/mm3      Monocytes, Absolute 0.40 10*3/mm3      Eosinophils, Absolute 0.12 10*3/mm3      Basophils, Absolute 0.03 10*3/mm3     Basic Metabolic Panel [[id number]]  (Normal) Collected: 03/28/23 0629    Specimen: Blood Updated: 03/28/23 0829     Glucose 86 mg/dL      BUN 16 mg/dL      Creatinine 0.94 mg/dL      Sodium 140 mmol/L      Potassium 3.7 mmol/L      Chloride 106 mmol/L      CO2 25.0 mmol/L      Calcium 8.7 mg/dL      BUN/Creatinine Ratio 17.0     Anion Gap 9.0 mmol/L      eGFR 85.6 mL/min/1.73     Narrative:      GFR Normal >60  Chronic Kidney Disease <60  Kidney Failure <15    The GFR formula is only valid for adults with stable  renal function between ages 18 and 70.        Imaging Results (Last 24 Hours)     ** No results found for the last 24 hours. **        ECG 12 Lead         Component  Ref Range & Units 09:23  (3/25/23) 1 d ago  (3/24/23) 1 d ago  (3/24/23)   QT Interval  ms 484 P  550  562    Resulting Agency BH ECG BH ECG BH ECG             HEART RATE= 75  bpm  RR Interval= 800  ms  IA Interval= 185  ms  P Horizontal Axis= 0  deg  P Front Axis= 29  deg  QRSD Interval= 98  ms  QT Interval= 484  ms  QRS Axis= 38  deg  T Wave Axis= 85  deg  - ABNORMAL ECG -  Sinus rhythm  Low voltage, precordial leads  Nonspecific T abnrm, anterolateral leads  Prolonged QT interval           2D echo showed normal ejection fraction  Stress Cardiolite showed no ischemia    Current Facility-Administered Medications:   •  acetaminophen (TYLENOL) tablet 650 mg, 650 mg, Oral, Q6H PRN, Gregg Solorzano MD, 650 mg at 03/25/23 0911  •  amLODIPine (NORVASC) tablet 5 mg, 5 mg, Oral, Daily, Dori Castanon APRN, 5 mg at 03/28/23 0915  •  aspirin chewable tablet 81 mg, 81 mg, Oral, Daily, Gregg Solorzano MD, 81 mg at 03/28/23 0914  •  atorvastatin (LIPITOR) tablet 10 mg, 10 mg, Oral, Nightly, Gregg Solorzano MD, 10 mg at 03/27/23 1951  •  DULoxetine (CYMBALTA) DR capsule 40 mg, 40 mg, Oral, Daily, David Vásquez III, MD, 40 mg at 03/28/23 0915  •  Influenza Vac High-Dose Quad (FLUZONE HIGH DOSE) injection 0.7 mL, 0.7 mL, Intramuscular, During Hospitalization, Gregg Solorzano MD  •  ipratropium-albuterol (DUO-NEB) nebulizer solution 3 mL, 3 mL, Nebulization, Q4H PRN, Gregg Solorzano MD  •  isosorbide mononitrate (IMDUR) 24 hr tablet 30 mg, 30 mg, Oral, Q24H, Dori Castanon APRN, 30 mg at 03/28/23 0915  •  levothyroxine (SYNTHROID, LEVOTHROID) tablet 150 mcg, 150 mcg, Oral, Q AM, Gregg Solorzano MD, 150 mcg at 03/28/23 0536  •  metoprolol tartrate (LOPRESSOR) tablet 12.5 mg, 12.5 mg, Oral, Q12H, Gregg Solorzano MD, 12.5 mg at 03/28/23 0914  •  morphine  injection 2 mg, 2 mg, Intravenous, Q4H PRN, Crissy Melendrez MD, 2 mg at 03/28/23 1302  •  pantoprazole (PROTONIX) EC tablet 40 mg, 40 mg, Oral, BID AC, Christina Solorzano MD, 40 mg at 03/28/23 0536  •  sertraline (ZOLOFT) tablet 100 mg, 100 mg, Oral, Daily, Christina Solorzano MD, 100 mg at 03/28/23 0915  •  traZODone (DESYREL) tablet 150 mg, 150 mg, Oral, Nightly, Christina Solorzano MD, 150 mg at 03/27/23 1951    ASSESSMENT  Severe hypothyroidism  Elevated troponin  Hypertension  Hyperlipidemia  Morbidly obese  Depression  Noncompliance with medications  Gastroesophageal reflux disease    PLAN  CPM  Home medication adjusted  Stress ulcer DVT prophylaxis  Psych input appreciated   Cardiology to follow patient  Supportive care  PT OT  Discussed with nursing staff  Subacute rehab once bed available    CHRISTINA SOLORZANO MD    Copied text in this note has been reviewed and is accurate as of 03/28/23

## 2023-03-29 VITALS
DIASTOLIC BLOOD PRESSURE: 77 MMHG | HEIGHT: 70 IN | RESPIRATION RATE: 18 BRPM | SYSTOLIC BLOOD PRESSURE: 143 MMHG | HEART RATE: 67 BPM | OXYGEN SATURATION: 93 % | BODY MASS INDEX: 43.23 KG/M2 | TEMPERATURE: 98 F | WEIGHT: 302 LBS

## 2023-03-29 PROBLEM — E03.9 SEVERE HYPOTHYROIDISM: Status: ACTIVE | Noted: 2023-03-29

## 2023-03-29 PROCEDURE — 25010000002 MORPHINE PER 10 MG: Performed by: INTERNAL MEDICINE

## 2023-03-29 PROCEDURE — 63710000001 PANTOPRAZOLE 40 MG TABLET DELAYED-RELEASE: Performed by: HOSPITALIST

## 2023-03-29 PROCEDURE — A9270 NON-COVERED ITEM OR SERVICE: HCPCS | Performed by: HOSPITALIST

## 2023-03-29 PROCEDURE — G0378 HOSPITAL OBSERVATION PER HR: HCPCS

## 2023-03-29 PROCEDURE — 97535 SELF CARE MNGMENT TRAINING: CPT

## 2023-03-29 PROCEDURE — 99232 SBSQ HOSP IP/OBS MODERATE 35: CPT

## 2023-03-29 PROCEDURE — 97110 THERAPEUTIC EXERCISES: CPT

## 2023-03-29 PROCEDURE — 96376 TX/PRO/DX INJ SAME DRUG ADON: CPT

## 2023-03-29 RX ORDER — DULOXETINE 40 MG/1
40 CAPSULE, DELAYED RELEASE ORAL DAILY
Qty: 30 CAPSULE | Refills: 0 | Status: SHIPPED | OUTPATIENT
Start: 2023-03-30 | End: 2023-04-29

## 2023-03-29 RX ORDER — PANTOPRAZOLE SODIUM 40 MG/1
40 TABLET, DELAYED RELEASE ORAL
Qty: 60 TABLET | Refills: 0 | Status: SHIPPED | OUTPATIENT
Start: 2023-03-29 | End: 2023-04-28

## 2023-03-29 RX ORDER — LEVOTHYROXINE SODIUM 0.15 MG/1
150 TABLET ORAL
Qty: 30 TABLET | Refills: 0 | Status: SHIPPED | OUTPATIENT
Start: 2023-03-30 | End: 2023-04-29

## 2023-03-29 RX ORDER — ATORVASTATIN CALCIUM 10 MG/1
10 TABLET, FILM COATED ORAL NIGHTLY
Qty: 30 TABLET | Refills: 0 | Status: SHIPPED | OUTPATIENT
Start: 2023-03-29 | End: 2023-04-28

## 2023-03-29 RX ORDER — ASPIRIN 81 MG/1
81 TABLET, CHEWABLE ORAL DAILY
Qty: 30 TABLET | Refills: 0 | Status: SHIPPED | OUTPATIENT
Start: 2023-03-30 | End: 2023-04-29

## 2023-03-29 RX ORDER — AMLODIPINE BESYLATE 5 MG/1
5 TABLET ORAL DAILY
Qty: 30 TABLET | Refills: 0 | Status: SHIPPED | OUTPATIENT
Start: 2023-03-30 | End: 2023-04-29

## 2023-03-29 RX ADMIN — PANTOPRAZOLE SODIUM 40 MG: 40 TABLET, DELAYED RELEASE ORAL at 05:51

## 2023-03-29 RX ADMIN — MORPHINE SULFATE 2 MG: 2 INJECTION, SOLUTION INTRAMUSCULAR; INTRAVENOUS at 15:31

## 2023-03-29 RX ADMIN — ASPIRIN 81 MG: 81 TABLET, CHEWABLE ORAL at 08:23

## 2023-03-29 RX ADMIN — AMLODIPINE BESYLATE 5 MG: 5 TABLET ORAL at 08:23

## 2023-03-29 RX ADMIN — ISOSORBIDE MONONITRATE 30 MG: 30 TABLET, EXTENDED RELEASE ORAL at 08:23

## 2023-03-29 RX ADMIN — METOPROLOL TARTRATE 12.5 MG: 25 TABLET, FILM COATED ORAL at 08:24

## 2023-03-29 RX ADMIN — LEVOTHYROXINE SODIUM 150 MCG: 0.15 TABLET ORAL at 05:51

## 2023-03-29 RX ADMIN — DULOXETINE HYDROCHLORIDE 40 MG: 20 CAPSULE, DELAYED RELEASE ORAL at 08:23

## 2023-03-29 RX ADMIN — SERTRALINE 100 MG: 100 TABLET, FILM COATED ORAL at 08:23

## 2023-03-29 RX ADMIN — MORPHINE SULFATE 2 MG: 2 INJECTION, SOLUTION INTRAMUSCULAR; INTRAVENOUS at 02:20

## 2023-03-29 RX ADMIN — PANTOPRAZOLE SODIUM 40 MG: 40 TABLET, DELAYED RELEASE ORAL at 18:07

## 2023-03-29 RX ADMIN — MORPHINE SULFATE 2 MG: 2 INJECTION, SOLUTION INTRAMUSCULAR; INTRAVENOUS at 08:48

## 2023-03-29 NOTE — PROGRESS NOTES
"Daily progress note    Primary care physician  Dr. Atkinson    Chief complaint  Doing same with no new complaint and denies any chest pain shortness of breath palpitation.    History of present illness  73 white male with history of hypertension hyperlipidemia hypothyroidism osteoarthritis and depression who has been noncompliant with medications presented to [facility] emergency room with generalized weakness.  Patient unable to take care of himself.  Patient has not taken his medication for last several weeks.  Patient denies any headache dizziness chest pain shortness of breath abdominal pain nausea vomiting diarrhea.  Patient work-up in ER revealed severe hypothyroidism who has been noncompliant with medication and also elevated troponin with no chest pain admit for management.  Patient lives by himself and willing to go to nursing home after stabilization.     REVIEW OF SYSTEMS  Unremarkable      PHYSICAL EXAM  Blood pressure 142/77, pulse 64, temperature 98.6 °F (37 °C), temperature source Oral, resp. rate 16, height 177.8 cm (70\"), weight (!) 137 kg (302 lb), SpO2 91 %.    Constitutional:       Appearance: Normal appearance. He is obese.   HENT:      Head: Normocephalic and atraumatic.      Mouth/Throat:      Mouth: Mucous membranes are moist.   Eyes:      Extraocular Movements: Extraocular movements intact.      Pupils: Pupils are equal, round, and reactive to light.   Cardiovascular:      Rate and Rhythm: Normal rate and regular rhythm.   Pulmonary:      Effort: Pulmonary effort is normal.      Breath sounds: Normal breath sounds.   Abdominal:      General: There is no distension.      Palpations: Abdomen is soft.      Tenderness: There is no abdominal tenderness.   Musculoskeletal:      Cervical back: Normal range of motion and neck supple.   Neurological:      Mental Status: He is alert and oriented to person, place, and time. Mental status is at baseline.   Psychiatric:         Mood and Affect: " Mood normal.         Behavior: Behavior normal.         Thought Content: Thought content normal.         Judgment: Judgment normal.      LAB RESULTS  Lab Results (last 24 hours)     ** No results found for the last 24 hours. **        Imaging Results (Last 24 Hours)     ** No results found for the last 24 hours. **        ECG 12 Lead         Component  Ref Range & Units 09:23  (3/25/23) 1 d ago  (3/24/23) 1 d ago  (3/24/23)   QT Interval  ms 484 P  550  562    Resulting Agency  ECG  ECG  ECG             HEART RATE= 75  bpm  RR Interval= 800  ms  TX Interval= 185  ms  P Horizontal Axis= 0  deg  P Front Axis= 29  deg  QRSD Interval= 98  ms  QT Interval= 484  ms  QRS Axis= 38  deg  T Wave Axis= 85  deg  - ABNORMAL ECG -  Sinus rhythm  Low voltage, precordial leads  Nonspecific T abnrm, anterolateral leads  Prolonged QT interval           2D echo showed normal ejection fraction  Stress Cardiolite showed no ischemia    Current Facility-Administered Medications:   •  acetaminophen (TYLENOL) tablet 650 mg, 650 mg, Oral, Q6H PRN, Gregg Solorzano MD, 650 mg at 03/25/23 0911  •  amLODIPine (NORVASC) tablet 5 mg, 5 mg, Oral, Daily, Dori Castanon APRN, 5 mg at 03/29/23 0823  •  aspirin chewable tablet 81 mg, 81 mg, Oral, Daily, Gregg Solorzano MD, 81 mg at 03/29/23 0823  •  atorvastatin (LIPITOR) tablet 10 mg, 10 mg, Oral, Nightly, Gregg Solorzano MD, 10 mg at 03/28/23 2010  •  DULoxetine (CYMBALTA) DR capsule 40 mg, 40 mg, Oral, Daily, David Vásquez III, MD, 40 mg at 03/29/23 0823  •  Influenza Vac High-Dose Quad (FLUZONE HIGH DOSE) injection 0.7 mL, 0.7 mL, Intramuscular, During Hospitalization, Gregg Solorzano MD  •  ipratropium-albuterol (DUO-NEB) nebulizer solution 3 mL, 3 mL, Nebulization, Q4H PRN, Gregg Solorzano MD  •  isosorbide mononitrate (IMDUR) 24 hr tablet 30 mg, 30 mg, Oral, Q24H, Dori Castanon APRN, 30 mg at 03/29/23 0823  •  levothyroxine (SYNTHROID, LEVOTHROID) tablet 150 mcg, 150 mcg,  Oral, Q AM, Christina Solorzano MD, 150 mcg at 03/29/23 0551  •  metoprolol tartrate (LOPRESSOR) tablet 12.5 mg, 12.5 mg, Oral, Q12H, Christina Solorzano MD, 12.5 mg at 03/29/23 0824  •  morphine injection 2 mg, 2 mg, Intravenous, Q4H PRN, Crissy Melendrez MD, 2 mg at 03/29/23 0848  •  pantoprazole (PROTONIX) EC tablet 40 mg, 40 mg, Oral, BID AC, Christina Solorzano MD, 40 mg at 03/29/23 0551  •  sertraline (ZOLOFT) tablet 100 mg, 100 mg, Oral, Daily, Christina Solorzano MD, 100 mg at 03/29/23 0823  •  traZODone (DESYREL) tablet 150 mg, 150 mg, Oral, Nightly, Christina Solorzano MD, 150 mg at 03/28/23 2010    ASSESSMENT  Severe hypothyroidism  Elevated troponin  Hypertension  Hyperlipidemia  Morbidly obese  Depression  Noncompliance with medications  Gastroesophageal reflux disease    PLAN  Discharge to subacute rehab  Discharge summary dictated    CHRISTINA SOLORZANO MD    Copied text in this note has been reviewed and is accurate as of 03/29/23

## 2023-03-29 NOTE — PLAN OF CARE
Goal Outcome Evaluation:  Plan of Care Reviewed With: patient        Progress: no change  Outcome Evaluation: Patient alert and oriented X4. 2L NC while asleep. Patient continues to c/o of back pain controlled with morphine. VSS.

## 2023-03-29 NOTE — PROGRESS NOTES
Patient is in brighter spirits when seen today.  Charting indicates probable discharge to rehabilitation later today.  I would recommend continuation of his current dose of Cymbalta at discharge

## 2023-03-29 NOTE — PLAN OF CARE
Goal Outcome Evaluation:  Plan of Care Reviewed With: patient           Outcome Evaluation: OT-Pt. sleeping initially. Reports back and general BLE pain 8/10 with some dizziness at times, but agreed to participate. UE therap. ex. completed to increase act. tolerance for self care tasks. Intermitted rest breaks required. Oral hygiene/ grooming set up and SBA. Bed mobility rolling R-L mod A with verbal cues. Overall decreased functional enduance, generalized weakness to perform self care. Plan to cont. OT and progress as tolerated. Anticipate SNF, LTC with cont. therapies.    Patient was not wearing a face mask during this therapy encounter. Therapist used appropriate personal protective equipment including mask and gloves.  Mask used was standard procedure mask. Appropriate PPE was worn during the entire therapy session. Hand hygiene was completed before and after therapy session. Patient is not in enhanced droplet precautions.

## 2023-03-29 NOTE — DISCHARGE SUMMARY
Discharge summary    Date of admission 3/24/2023  Date of discharge 3/29/2023    Final diagnosis  Severe hypothyroidism  Atypical chest pain  Elevated troponin and abnormal EKG  Hypertension  Hyperlipidemia  Morbidly obese  Depression  Noncompliance with medications  Gastroesophageal reflux disease    Discharge medications    Current Facility-Administered Medications:   •  acetaminophen (TYLENOL) tablet 650 mg, 650 mg, Oral, Q6H PRN, Gregg Solorzano MD, 650 mg at 03/25/23 0911  •  amLODIPine (NORVASC) tablet 5 mg, 5 mg, Oral, Daily, Dori Castanon APRN, 5 mg at 03/29/23 0823  •  aspirin chewable tablet 81 mg, 81 mg, Oral, Daily, Gregg Solorzano MD, 81 mg at 03/29/23 0823  •  atorvastatin (LIPITOR) tablet 10 mg, 10 mg, Oral, Nightly, Gregg Solorzano MD, 10 mg at 03/28/23 2010  •  DULoxetine (CYMBALTA) DR capsule 40 mg, 40 mg, Oral, Daily, David Vásquez III, MD, 40 mg at 03/29/23 0823  •  Influenza Vac High-Dose Quad (FLUZONE HIGH DOSE) injection 0.7 mL, 0.7 mL, Intramuscular, During Hospitalization, Gregg Solorzano MD  •  ipratropium-albuterol (DUO-NEB) nebulizer solution 3 mL, 3 mL, Nebulization, Q4H PRN, Gregg Solorzano MD  •  isosorbide mononitrate (IMDUR) 24 hr tablet 30 mg, 30 mg, Oral, Q24H, Dori Castanon APRN, 30 mg at 03/29/23 0823  •  levothyroxine (SYNTHROID, LEVOTHROID) tablet 150 mcg, 150 mcg, Oral, Q AM, Gregg Solorzano MD, 150 mcg at 03/29/23 0551  •  metoprolol tartrate (LOPRESSOR) tablet 12.5 mg, 12.5 mg, Oral, Q12H, Gregg Solorzano MD, 12.5 mg at 03/29/23 0824  •  morphine injection 2 mg, 2 mg, Intravenous, Q4H PRN, Crissy Melendrez MD, 2 mg at 03/29/23 1531  •  pantoprazole (PROTONIX) EC tablet 40 mg, 40 mg, Oral, BID AC, Gregg Solorzano MD, 40 mg at 03/29/23 0551  •  sertraline (ZOLOFT) tablet 100 mg, 100 mg, Oral, Daily, Gregg Solorzano MD, 100 mg at 03/29/23 0823  •  traZODone (DESYREL) tablet 150 mg, 150 mg, Oral, Nightly, Gregg Solorzano MD, 150 mg at 03/28/23 2010     Consults  obtained  Cardiology  Psychiatry      Procedures  2D echo within normal limit with ejection fraction 70%  Stress Cardiolite showed no ischemia    Hospital course  73-year-old white male with multiple medical problem who has been noncompliant with medication for several weeks admitted to emergency room with weakness.  Patient work-up in ER revealed elevated troponin with abnormal EKG admitted for management.  Patient started on his home medications and further evaluated by cardiology with 2D echo and stress Cardiolite.  Patient stabilize and will be discharged to subacute rehab for PT OT nursing care.  Patient does have severe hypothyroidism and his home dose adjusted.    Discharge diet regular    Activity per PT OT    Medication as above    Further care per accepting physician at nursing home and follow-up with cardiology per the instructions and take medication as directed    CHRISTINA VAUGHN MD

## 2023-03-29 NOTE — PROGRESS NOTES
[facility] attempted f/u d/t depression. Pt w/ other provider at this time and unavailable; however spoke w/ RN who states pt expressing feelings of depression d/t being unable to care for himself and having flat affect. Psychiatry following.   [facility] following.

## 2023-03-29 NOTE — CASE MANAGEMENT/SOCIAL WORK
"Physicians Statement of Medical Necessity for  Ambulance Transportation    GENERAL INFORMATION     Name: Nicanor Haley  YOB: 1950  [place] Medicare Replacement #: [id number]  Transport Date: 3/29/23(Valid for round trips this date, or for scheduled repetitive trips for 60 days from the date signed below.)  Origin: [facility] Destination: [place] [id number] [place] [address] Accelerated unit  Is the Patient's stay covered under Medicare Part A (PPS/DRG?)Yes  Closest appropriate facility? Yes  If this a hosp-hosp transfer? No  Is this a hospice patient? No    MEDICAL NECESSITY QUESTIONAIRE    Ambulance Transportation is medically necessary only if other means of transportation are contraindicated or would be potentially harmful to the patient.  To meet this requirement, the patient must be either \"bed confined\" or suffer from a condition such that transport by means other than an ambulance is contraindicated by the patient's condition.  The following questions must be answered by the healthcare professional signing below for this form to be valid:     1) Describe the MEDICAL CONDITION (physical and/or mental) of this patient AT THE TIME OF AMBULANCE TRANSPORT that requires the patient to be transported in an ambulance, and why transport by other means is contraindicated by the patient's condition:   Past Medical History:   Diagnosis Date   • Arthritis    • Colon cancer (HCC) 2014    tubulovillous adenomas with adenocarcinoma in situ s/p right open colectomy   • Colon polyps 08/06/2014    1) cecal polyps: fragments of adenomatous polyp with low grade-dysplasia 2) ascending colon mass bx: fragments of tubulovillous adenoma w/ low grade dysplasia 3) transverse colon polyp: fragments of adenomatous polyp with low grade dysplasia 4) sigmoid polyp: fragments of adenomatous polyp with low grade dysplasia   • Depression    • Gout    • Hearing loss    • Hyperlipemia    • " "Hypertension    • Hypothyroidism    • Kidney stones    • Polio    • Rheumatoid arthritis (HCC)    • Sleep apnea       Past Surgical History:   Procedure Laterality Date   • CHOLECYSTECTOMY WITH INTRAOPERATIVE CHOLANGIOGRAM N/A 11/10/2017    Procedure: CHOLECYSTECTOMY LAPAROSCOPIC INTRAOPERATIVE CHOLANGIOGRAM;  Surgeon: Elian Carnes MD;  Location: [facility] OR;  Service:    • COLECTOMY PARTIAL / TOTAL Right 09/08/2014    Open right colectomy-Dr. Elian Carnes   • COLONOSCOPY N/A 3/15/2017    Procedure: COLONOSCOPY TO ANASTAMOSIS AND TI WITH COLD SNARE POLYPECTOMY ;  Surgeon: Elian Carnes MD;  Location: [facility] ENDOSCOPY;  Service:    • COLONOSCOPY N/A 08/06/2014    1 cm cecal polyp, 1 cm transverse polyp, 1 cm sigmoid colon polyp, mass in the ascending colon occupying 1/3 of the colonic circumference, mass proximal to the transverse colon occupying 1/4 colonic circumference-Dr. Elian Carnes   • COLONOSCOPY N/A 5/1/2019    Procedure: COLONOSCOPY to ILEOCOLIC ANASTAMOSIS WITH HOT SNARE/COLD BX POLYPECTOMY;  Surgeon: Elian Carnes MD;  Location: [facility] ENDOSCOPY;  Service: General   • FOOT SURGERY Right 2008, 2009   • GASTRIC BANDING N/A 12/28/2009   • NOSE SURGERY N/A 1989   • ORIF TIBIA/FIBULA FRACTURES Left 12/17/2017    Procedure: TIBIA/FIBULA OPEN REDUCTION INTERNAL FIXATION;  Surgeon: Lai Acevedo Jr., MD;  Location: [facility] OR;  Service:    • ORIF TIBIA/FIBULA FRACTURES Right    • TOTAL KNEE ARTHROPLASTY Bilateral 1/2002, 6/2002 1/2002-left knee 6/2002-right knee-Dr. Jaun Sosa [facility]      2) Is this patient \"bed confined\" as defined below?Yes   To be \"bed confined\" the patient must satisfy all three of the following criteria:  (1) unable to get up from bed without assistance; AND (2) unable to ambulate;  AND (3) unable to sit in a chair or wheelchair.  3) Can this patient safely be transported by car or wheelchair van (I.e., may safely sit during transport, without an attendant or " monitoring?)No   4. In addition to completing questions 1-3 above, please check any of the following conditions that apply*:          *Note: supporting documentation for any boxes checked must be maintained in the patient's medical records Moderate/severe pain on movement and Unable to tolerate seated position for time needed to transport      SIGNATURE OF PHYSICIAN OR OTHER AUTHORIZED HEALTHCARE PROFESSIONAL    I certify that the above information is true and correct based on my evaluation of this patient, and represent that the patient requires transport by ambulance and that other forms of transport are contraindicated.  I understand that this information will be used by the Centers for Medicare and Medicaid Services (CMS) to support the determiniation of medical necessity for ambulance services, and I represent that I have personal knowledge of the patient's condition at the time of transport.       If this box is checked, I also certify that the patient is physically or mentally incapable of signing the ambulance service's claim form and that the institution with which I am affiliated has furnished care, services or assistance to the patient.  My signature below is made on behalf of the patient pursuant to 42 .36(b)(4). In accordance with 42 .37, the specific reason(s) that the patient is physically or mentally incapable of signing the claim for is as follows:     Signature of Physician or Healthcare Professional  Date/Time:   3/29/23     (For Scheduled repetitive transport, this form is not valid for transports performed more than 60 days after this date).                                                                                                                                            --------------------------------------------------------------------------------------------  Printed Name and Credentials of Physician or Authorized Healthcare Professional     *Form must be signed by  patient's attending physician for scheduled, repetitive transports,.  For non-repetitive ambulance transports, if unable to obtain the signature of the attending physician, any of the following may sign (please select below):     Physician  Clinical Nurse Specialist  Registered Nurse     Physician Assistant  Discharge Planner  Licensed Practical Nurse     Nurse Practitioner

## 2023-03-29 NOTE — NURSING NOTE
This RN has called facility multiple times to give report, either put on hold with no response or no answer at all. Will pass on information to nightshift nurse. Ambulance is scheduled for 1930.

## 2023-03-29 NOTE — CASE MANAGEMENT/SOCIAL WORK
Continued Stay Note  [facility]     Patient Name: Nicanor Haley  MRN: [id number]  Today's Date: 3/29/2023    Admit Date: 3/24/2023    Plan: [facility]   Discharge Plan     Row Name 03/29/23 1336       Plan    Plan [facility]    Plan Comments Pre-cert for [facility] obtained. Patient plans for SNF to LTC.               Discharge Codes    No documentation.               Expected Discharge Date and Time     Expected Discharge Date Expected Discharge Time    Mar 29, 2023             Libertad Moore RN

## 2023-03-29 NOTE — THERAPY TREATMENT NOTE
Patient Name: Nicanor TAN Ar  : 1950    MRN: [id number]                              Today's Date: 3/29/2023       Admit Date: 3/24/2023    Visit Dx:     ICD-10-CM ICD-9-CM   1. Generalized weakness  R53.1 780.79   2. Elevated TSH  R79.89 794.5   3. Elevated troponin  R77.8 790.6     Patient Active Problem List   Diagnosis   • History of colon polyps   • Acute pancreatitis due to calculus of common bile duct   • Abdominal pain, generalized   • Closed left tibial fracture   • Closed bimalleolar fracture of left ankle   • Left tibial fracture   • Closed fracture of upper end of left fibula   • Immobility syndrome   • HTN (hypertension)   • Generalized abdominal pain   • Other constipation   • Diarrhea   • Hx of adenomatous colonic polyps   • Generalized weakness     Past Medical History:   Diagnosis Date   • Arthritis    • Colon cancer (HCC) 2014    tubulovillous adenomas with adenocarcinoma in situ s/p right open colectomy   • Colon polyps 2014    1) cecal polyps: fragments of adenomatous polyp with low grade-dysplasia 2) ascending colon mass bx: fragments of tubulovillous adenoma w/ low grade dysplasia 3) transverse colon polyp: fragments of adenomatous polyp with low grade dysplasia 4) sigmoid polyp: fragments of adenomatous polyp with low grade dysplasia   • Depression    • Gout    • Hearing loss    • Hyperlipemia    • Hypertension    • Hypothyroidism    • Kidney stones    • Polio    • Rheumatoid arthritis (HCC)    • Sleep apnea      Past Surgical History:   Procedure Laterality Date   • CHOLECYSTECTOMY WITH INTRAOPERATIVE CHOLANGIOGRAM N/A 11/10/2017    Procedure: CHOLECYSTECTOMY LAPAROSCOPIC INTRAOPERATIVE CHOLANGIOGRAM;  Surgeon: Elian Carnes MD;  Location: [facility];  Service:    • COLECTOMY PARTIAL / TOTAL Right 2014    Open right colectomy-Dr. Elian Carnes   • COLONOSCOPY N/A 3/15/2017    Procedure: COLONOSCOPY TO ANASTAMOSIS AND TI WITH COLD SNARE POLYPECTOMY ;  Surgeon:  Elian Carnes MD;  Location: [facility] ENDOSCOPY;  Service:    • COLONOSCOPY N/A 08/06/2014    1 cm cecal polyp, 1 cm transverse polyp, 1 cm sigmoid colon polyp, mass in the ascending colon occupying 1/3 of the colonic circumference, mass proximal to the transverse colon occupying 1/4 colonic circumference-Dr. Elian Carnes   • COLONOSCOPY N/A 5/1/2019    Procedure: COLONOSCOPY to ILEOCOLIC ANASTAMOSIS WITH HOT SNARE/COLD BX POLYPECTOMY;  Surgeon: Elian Carnes MD;  Location: [facility] ENDOSCOPY;  Service: General   • FOOT SURGERY Right 2008, 2009   • GASTRIC BANDING N/A 12/28/2009   • NOSE SURGERY N/A 1989   • ORIF TIBIA/FIBULA FRACTURES Left 12/17/2017    Procedure: TIBIA/FIBULA OPEN REDUCTION INTERNAL FIXATION;  Surgeon: Lai Acevedo Jr., MD;  Location: [facility] MAIN OR;  Service:    • ORIF TIBIA/FIBULA FRACTURES Right    • TOTAL KNEE ARTHROPLASTY Bilateral 1/2002, 6/2002 1/2002-left knee [facility]      General Information     Row Name 03/29/23 1237          OT Time and Intention    Document Type therapy note (daily note)  -CW     Mode of Treatment occupational therapy  -CW     Row Name 03/29/23 1237          General Information    Patient Profile Reviewed yes  -CW     Row Name 03/29/23 1237          Cognition    Orientation Status (Cognition) oriented to;person;place  -CW     Row Name 03/29/23 1237          Safety Issues, Functional Mobility    Safety Issues Affecting Function (Mobility) insight into deficits/self-awareness;awareness of need for assistance;safety precaution awareness;judgment  -           User Key  (r) = Recorded By, (t) = Taken By, (c) = Cosigned By    Initials Name Provider Type    CW Joanna Dave OTR Occupational Therapist                 Mobility/ADL's     Row Name 03/29/23 1240          Bed Mobility    Bed Mobility rolling left;rolling right  -CW     Rolling Left [place] (Bed Mobility) moderate assist (50% patient effort);verbal cues  -CW      Rolling Right [place] (Bed Mobility) moderate assist (50% patient effort);verbal cues  -CW     Row Name 03/29/23 1240          Activities of Daily Living    BADL Assessment/Intervention grooming  -     Row Name 03/29/23 1240          Grooming Assessment/Training    [place] Level (Grooming) hair care, combing/brushing;grooming skills;oral care regimen;set up;standby assist  -CW     Position (Grooming) sitting up in bed  -           User Key  (r) = Recorded By, (t) = Taken By, (c) = Cosigned By    Initials Name Provider Type    Joanna Mccloud OTR Occupational Therapist               Obj/Interventions     Row Name 03/29/23 1245          Shoulder (Therapeutic Exercise)    Shoulder (Therapeutic Exercise) AROM (active range of motion)  -     Shoulder AROM (Therapeutic Exercise) bilateral;flexion;extension;horizontal aBduction/aDduction;10 repetitions;2 sets  -     Row Name 03/29/23 1245          Elbow/Forearm (Therapeutic Exercise)    Elbow/Forearm (Therapeutic Exercise) AROM (active range of motion)  -     Elbow/Forearm AROM (Therapeutic Exercise) bilateral;flexion;extension;supination;pronation;10 repetitions;2 sets  -     Row Name 03/29/23 1245          Wrist (Therapeutic Exercise)    Wrist (Therapeutic Exercise) AROM (active range of motion)  -     Wrist AROM (Therapeutic Exercise) bilateral;flexion;extension;10 repetitions;2 sets  -     Row Name 03/29/23 1245          Hand (Therapeutic Exercise)    Hand (Therapeutic Exercise) AROM (active range of motion)  -     Hand AROM/AAROM (Therapeutic Exercise) bilateral;AROM (active range of motion);finger extension;finger flexion  -     Row Name 03/29/23 1245          Motor Skills    Motor Skills functional endurance  -CW     Functional Endurance poor  -     Therapeutic Exercise shoulder;elbow/forearm;wrist;hand  -           User Key  (r) = Recorded By, (t) = Taken By, (c) = Cosigned By    Initials Name Provider Type    Joanna Mccloud  OTR Occupational Therapist               Goals/Plan    No documentation.                Clinical Impression     Row Name 03/29/23 1247          Pain Assessment    Pretreatment Pain Rating 8/10  -CW     Posttreatment Pain Rating 8/10  -CW     Pain Location lower  -CW     Pain Location - back;extremity  -CW     Pain Intervention(s) Repositioned  -CW     Row Name 03/29/23 1247          Plan of Care Review    Plan of Care Reviewed With patient  -CW     Outcome Evaluation OT-Pt. sleeping initially. Reports back and general BLE pain 8/10 with some dizziness at times, but agreed to participate. UE therap. ex. completed to increase act. tolerance for self care tasks. Intermitted rest breaks required. Oral hygiene/ grooming set up and SBA. Bed mobility rolling R-L mod A with verbal cues. Overall decreased functional enduance, generalized weakness to perform self care. Plan to cont. OT and progress as tolerated. Anticipate SNF, LTC with cont. therapies.  -CW     Row Name 03/29/23 1247          Positioning and Restraints    Pre-Treatment Position in bed  -CW     Post Treatment Position bed  -CW     In Bed call light within reach;encouraged to call for assist;exit alarm on;supine  -CW           User Key  (r) = Recorded By, (t) = Taken By, (c) = Cosigned By    Initials Name Provider Type    CW Joanna Dave OTR Occupational Therapist               Outcome Measures     Row Name 03/29/23 1256          How much help from another is currently needed...    Putting on and taking off regular lower body clothing? 1  -CW     Bathing (including washing, rinsing, and drying) 1  -CW     Toileting (which includes using toilet bed pan or urinal) 1  -CW     Putting on and taking off regular upper body clothing 2  -CW     Taking care of personal grooming (such as brushing teeth) 2  -CW     Eating meals 3  -CW     AM-PAC 6 Clicks Score (OT) 10  -CW           User Key  (r) = Recorded By, (t) = Taken By, (c) = Cosigned By    Initials Name  Provider Type    CW Joanna Dave OTR Occupational Therapist                Occupational Therapy Education     Title: PT OT SLP Therapies (Done)     Topic: Occupational Therapy (Done)     Point: ADL training (Done)     Description:   Instruct learner(s) on proper safety adaptation and remediation techniques during self care or transfers.   Instruct in proper use of assistive devices.              Learning Progress Summary           Patient Acceptance, E, VU,NR by BRYAN at 3/29/2023 1257    Acceptance, E, VU,DU by YAYO at 3/27/2023 1611                   Point: Home exercise program (Done)     Description:   Instruct learner(s) on appropriate technique for monitoring, assisting and/or progressing therapeutic exercises/activities.              Learning Progress Summary           Patient Acceptance, E, VU,NR by BRYAN at 3/29/2023 1257    Acceptance, E, VU,DU by YAYO at 3/27/2023 1611                               User Key     Initials Effective Dates Name Provider Type Discipline     06/16/21 -  Joanna Dave OTR Occupational Therapist OT    YAYO 09/22/22 -  Sammie Celestin OT Occupational Therapist OT              OT Recommendation and Plan     Plan of Care Review  Plan of Care Reviewed With: patient  Outcome Evaluation: OT-Pt. sleeping initially. Reports back and general BLE pain 8/10 with some dizziness at times, but agreed to participate. UE therap. ex. completed to increase act. tolerance for self care tasks. Intermitted rest breaks required. Oral hygiene/ grooming set up and SBA. Bed mobility rolling R-L mod A with verbal cues. Overall decreased functional enduance, generalized weakness to perform self care. Plan to cont. OT and progress as tolerated. Anticipate SNF, LTC with cont. therapies.     Time Calculation:    Time Calculation- OT     Row Name 03/29/23 1258             Time Calculation- OT    OT Start Time 1004  -CW      OT Stop Time 1027  -CW      OT Time Calculation (min) 23 min  -CW      Total Timed Code  Minutes- OT 23 minute(s)  -CW      OT - Next Appointment 03/30/23  -CW         Timed Charges    03446 - OT Therapeutic Exercise Minutes 13  -CW      74011 - OT Self Care/Mgmt Minutes 10  -CW         Total Minutes    Timed Charges Total Minutes 23  -CW       Total Minutes 23  -CW            User Key  (r) = Recorded By, (t) = Taken By, (c) = Cosigned By    Initials Name Provider Type     Joanna Dave OTR Occupational Therapist              Therapy Charges for Today     Code Description Service Date Service Provider Modifiers Qty    00681394777 HC OT THER PROC EA 15 MIN 3/29/2023 Joanna Dave OTR GO 1    89612099321 HC OT SELF CARE/MGMT/TRAIN EA 15 MIN 3/29/2023 Joanna Dave OTR GO 1               ZOIE Benavides  3/29/2023

## 2023-03-29 NOTE — PROGRESS NOTES
[facility]  Cardiology Progress Note    Patient Identification:  Name: Nicanor Haley  Age: 73 y.o.  Sex: male  :  1950  MRN: [id number]                 Follow Up / Chief Complaint: Chest pain    Interval History: Stress test.  Denies shortness of breath.         Objective:    Past Medical History:  Past Medical History:   Diagnosis Date   • Arthritis    • Colon cancer (HCC)     tubulovillous adenomas with adenocarcinoma in situ s/p right open colectomy   • Colon polyps 2014    1) cecal polyps: fragments of adenomatous polyp with low grade-dysplasia 2) ascending colon mass bx: fragments of tubulovillous adenoma w/ low grade dysplasia 3) transverse colon polyp: fragments of adenomatous polyp with low grade dysplasia 4) sigmoid polyp: fragments of adenomatous polyp with low grade dysplasia   • Depression    • Gout    • Hearing loss    • Hyperlipemia    • Hypertension    • Hypothyroidism    • Kidney stones    • Polio    • Rheumatoid arthritis (HCC)    • Sleep apnea      Past Surgical History:  Past Surgical History:   Procedure Laterality Date   • CHOLECYSTECTOMY WITH INTRAOPERATIVE CHOLANGIOGRAM N/A 11/10/2017    Procedure: CHOLECYSTECTOMY LAPAROSCOPIC INTRAOPERATIVE CHOLANGIOGRAM;  Surgeon: Elian Carnes MD;  Location: [facility] OR;  Service:    • COLECTOMY PARTIAL / TOTAL Right 2014    Open right colectomy-Dr. Elian Carnes   • COLONOSCOPY N/A 3/15/2017    Procedure: COLONOSCOPY TO ANASTAMOSIS AND TI WITH COLD SNARE POLYPECTOMY ;  Surgeon: Elian Carnes MD;  Location: [facility] ENDOSCOPY;  Service:    • COLONOSCOPY N/A 2014    1 cm cecal polyp, 1 cm transverse polyp, 1 cm sigmoid colon polyp, mass in the ascending colon occupying 1/3 of the colonic circumference, mass proximal to the transverse colon occupying 1/4 colonic circumference-Dr. Elian Carnes   • COLONOSCOPY N/A 2019    Procedure: COLONOSCOPY to ILEOCOLIC ANASTAMOSIS WITH HOT SNARE/COLD BX  POLYPECTOMY;  Surgeon: Elian Carnes MD;  Location: [facility] ENDOSCOPY;  Service: General   • FOOT SURGERY Right 2008, 2009   • GASTRIC BANDING N/A 12/28/2009   • NOSE SURGERY N/A 1989   • ORIF TIBIA/FIBULA FRACTURES Left 12/17/2017    Procedure: TIBIA/FIBULA OPEN REDUCTION INTERNAL FIXATION;  Surgeon: Lai Acevedo Jr., MD;  Location: [facility] MAIN OR;  Service:    • ORIF TIBIA/FIBULA FRACTURES Right    • TOTAL KNEE ARTHROPLASTY Bilateral 1/2002, 6/2002 1/2002-left knee 6[facility]        Social History:   Social History     Tobacco Use   • Smoking status: Never   • Smokeless tobacco: Never   Substance Use Topics   • Alcohol use: No      Family History:  Family History   Problem Relation Age of Onset   • Lung cancer Mother    • Hypertension Father    • Diabetes Sister    • Hypertension Brother    • Hypertension Brother    • Lung cancer Brother           Allergies:  Allergies   Allergen Reactions   • Keflex [Cephalexin] Mental Status Change     Pt reports that it makes him violent (12/17/17)      Scheduled Meds:  amLODIPine, 5 mg, Daily  aspirin, 81 mg, Daily  atorvastatin, 10 mg, Nightly  DULoxetine, 40 mg, Daily  isosorbide mononitrate, 30 mg, Q24H  levothyroxine, 150 mcg, Q AM  metoprolol tartrate, 12.5 mg, Q12H  pantoprazole, 40 mg, BID AC  sertraline, 100 mg, Daily  traZODone, 150 mg, Nightly            INTAKE AND OUTPUT:    Intake/Output Summary (Last 24 hours) at 3/29/2023 1116  Last data filed at 3/29/2023 0406  Gross per 24 hour   Intake 360 ml   Output 700 ml   Net -340 ml       Review of Systems:   GI: no n/v or abd pain  Cardiac: no chest pain or palpitations  Pulmonary: no shortness of breath or cough      Constitutional:  Temp:  [98 °F (36.7 °C)-98.8 °F (37.1 °C)] 98.6 °F (37 °C)  Heart Rate:  [64-68] 64  Resp:  [16-18] 16  BP: (119-142)/(66-79) 142/77    Physical Exam:  General:  Appears in no acute distress  Eyes: eom normal no conjunctival drainage  HEENT:  No  JVD. Thyroid not visibly enlarged. No mucosal pallor or cyanosis  Respiratory: Respirations regular and unlabored at rest. BBS with good air entry in all fields. No crackles, rubs or wheezes auscultated  Cardiovascular: S1S2 Regular rate and rhythm. No murmur, rub or gallop. No carotid bruits. DP/PT pulses   2+  . No pretibial pitting edema  Gastrointestinal: Abdomen soft, non tender. Bowel sounds present.   Musculoskeletal: No abnormal movements  Neuro: AAO x3 CN II-XII grossly intact  Psych: Mood and affect normal, pleasant and cooperative          I reviewed the patient's new clinical results, and personally reviewed and interpreted the patient's ECG and telemetry data from the last 24 hours      Cardiographics  Echocardiogram:     Interpretation Summary       •  Left ventricular ejection fraction appears to be 66 - 70%.  •  Left ventricular diastolic function was normal.  •  Estimated right ventricular systolic pressure from tricuspid regurgitation is normal (<35 mmHg).     Interpretation Summary       •  Findings consistent with a normal ECG stress test.  •  Left ventricular ejection fraction is normal (Calculated EF = 70%).  •  Myocardial perfusion imaging indicates a normal myocardial perfusion study with no evidence of ischemia.  •  Impressions are consistent with a low risk study.      Lab Review   Results from last 7 days   Lab Units 03/26/23  0526 03/25/23  0815 03/25/23  0613   HSTROP T ng/L 53* 56* 57*     Results from last 7 days   Lab Units 03/25/23  0815   MAGNESIUM mg/dL 2.1     Results from last 7 days   Lab Units 03/28/23  0629   SODIUM mmol/L 140   POTASSIUM mmol/L 3.7   BUN mg/dL 16   CREATININE mg/dL 0.94   CALCIUM mg/dL 8.7     @[id number]@  Results from last 7 days   Lab Units 03/28/23  0629 03/26/23  0526 03/25/23  0613   WBC 10*3/mm3 4.28 4.72 5.51   HEMOGLOBIN g/dL 12.3* 11.5* 12.5*   HEMATOCRIT % 36.6* 35.0* 38.6   PLATELETS 10*3/mm3 114* 116* 132*            The following medical  "decision was discussed in detail with Dr. Melendrez        Assessment:  Musculoskeletal chest pain worse with pressure  Borderline elevated troponin  Abnormal EKG  Obesity  Hypertension      Plan:  BP and HR stable, tele reviewed SR, no events overnight  Doing well on imdur. Complains of generalized pain-back legs chest. Pain in chest with palpation  Chest pain likely musculoskeletal  Continue aspirin, statin, imdur, bb    He will follow up with REZA Varner on April 21 at 9:15 AM at the [facility] office.       )3/29/2023  REZA Blount      EMR [place]/Transcription:   \"Dictated utilizing Dragon dictation\".     "

## 2023-03-29 NOTE — PLAN OF CARE
Goal Outcome Evaluation:              Outcome Evaluation: VSS. Plan for discharge today. Ambulance scheduled for 1930.

## 2023-03-29 NOTE — SIGNIFICANT NOTE
03/29/23 1352   OTHER   Discipline physical therapist   Rehab Time/Intention   Session Not Performed other (see comments)  (Pt stating he had a bowel movement in his brief. Pt needing to be cleaned up. Informed RN and CNA. Will attempt treatment tomorrow 3/30/23.)   Therapy Assessment/Plan (PT)   Criteria for Skilled Interventions Met (PT) meets criteria   Recommendation   PT - Next Appointment 03/30/23

## 2023-03-30 NOTE — CASE MANAGEMENT/SOCIAL WORK
Case Management Discharge Note      Final Note: Discharge to [facility] facility via [facility] @1930.         Selected Continued Care - Discharged on 3/29/2023 Admission date: 3/24/2023 - Discharge disposition: Skilled Nursing Facility (DC - External)    Destination    No services have been selected for the patient.              Durable Medical Equipment    No services have been selected for the patient.              Dialysis/Infusion    No services have been selected for the patient.              Home Medical Care    No services have been selected for the patient.              Therapy    No services have been selected for the patient.              Community Resources    No services have been selected for the patient.              Community & DME    No services have been selected for the patient.                       Final Discharge Disposition Code: 03 - skilled nursing facility (SNF)

## 2023-05-02 ENCOUNTER — OFFICE VISIT (OUTPATIENT)
Dept: CARDIOLOGY | Facility: CLINIC | Age: 73
End: 2023-05-02
Payer: MEDICARE

## 2023-05-02 VITALS
HEART RATE: 82 BPM | HEIGHT: 70 IN | SYSTOLIC BLOOD PRESSURE: 130 MMHG | BODY MASS INDEX: 43.33 KG/M2 | DIASTOLIC BLOOD PRESSURE: 76 MMHG

## 2023-05-02 DIAGNOSIS — Z09 HOSPITAL DISCHARGE FOLLOW-UP: ICD-10-CM

## 2023-05-02 DIAGNOSIS — G47.33 OBSTRUCTIVE SLEEP APNEA SYNDROME: Primary | ICD-10-CM

## 2023-05-02 DIAGNOSIS — I10 PRIMARY HYPERTENSION: ICD-10-CM

## 2023-05-02 DIAGNOSIS — E78.5 HYPERLIPIDEMIA, UNSPECIFIED HYPERLIPIDEMIA TYPE: ICD-10-CM

## 2023-05-02 RX ORDER — METOPROLOL SUCCINATE 25 MG/1
1 TABLET, EXTENDED RELEASE ORAL DAILY
COMMUNITY
Start: 2023-03-01

## 2023-05-02 RX ORDER — AMLODIPINE BESYLATE 5 MG/1
TABLET ORAL
COMMUNITY
Start: 2023-04-30

## 2023-05-02 RX ORDER — LEVOTHYROXINE SODIUM 0.2 MG/1
1 TABLET ORAL DAILY
COMMUNITY
Start: 2023-03-01

## 2023-05-02 RX ORDER — POTASSIUM CHLORIDE 20 MEQ/1
TABLET, EXTENDED RELEASE ORAL
COMMUNITY
Start: 2023-04-21

## 2023-05-02 NOTE — PROGRESS NOTES
Subjective:        Nicanor Haley is a 73 y.o. male who here for follow up    Chief Complaint   Patient presents with   • Follow-up     Hospital        HPI    This is a 73-year-old male, who is known to this provider.  He has a history of arthritis, hypertension, hyperlipidemia, hypothyroidism sleep apnea, and rheumatoid arthritis.  He was recently released from the hospital for severe hypothyroidism, atypical chest pain, hypertension, hyperlipidemia, noncompliance with medications, elevated troponin and abnormal EKG.    He recently was hospitalized and we followed him for chest pain.  He underwent a stress test indicating a normal Myocard perfusion study with no evidence of ischemia.  At that time revealed EF 66 to 70%, LV diastolic function was normal.    Lipid panel 3/26/2023 revealed , HDL 47, LDL 81 and triglycerides 112.  ALT/AST WNL.  TSH 66.300    Patient's history were reviewed and updated as appropriate: allergies, current medications, past family history, past medical history, past social history, past surgical history and problem list.    Past Medical History:   Diagnosis Date   • Arthritis    • Colon cancer 2014    tubulovillous adenomas with adenocarcinoma in situ s/p right open colectomy   • Colon polyps 08/06/2014    1) cecal polyps: fragments of adenomatous polyp with low grade-dysplasia 2) ascending colon mass bx: fragments of tubulovillous adenoma w/ low grade dysplasia 3) transverse colon polyp: fragments of adenomatous polyp with low grade dysplasia 4) sigmoid polyp: fragments of adenomatous polyp with low grade dysplasia   • Depression    • Gout    • Hearing loss    • Hyperlipemia    • Hypertension    • Hypothyroidism    • Kidney stones    • Polio    • Rheumatoid arthritis    • Sleep apnea          reports that he has never smoked. He has never used smokeless tobacco. He reports that he does not drink alcohol and does not use drugs.     Family History   Problem Relation Age of Onset   •  Lung cancer Mother    • Hypertension Father    • Diabetes Sister    • Hypertension Brother    • Hypertension Brother    • Lung cancer Brother        ROS     Review of Systems + chronic back pain  Constitutional: No wt loss, fever, fatigue  Gastrointestinal: No nausea, abdominal pain  Behavioral/Psych: No insomnia or anxiety  Cardiovascular: Denies chest pain, and shortness of breath      Objective:           Vitals and nursing note reviewed.   Constitutional:       Appearance: Well-developed.   HENT:      Head: Normocephalic.      Right Ear: External ear normal.      Left Ear: External ear normal.   Neck:      Vascular: No JVD.   Pulmonary:      Effort: Pulmonary effort is normal. No respiratory distress.      Breath sounds: Normal breath sounds. No stridor. No rales.   Cardiovascular:      Normal rate. Regular rhythm.      No gallop.   Pulses:     Intact distal pulses.   Edema:     Peripheral edema absent.   Abdominal:      General: Bowel sounds are normal. There is no distension.      Palpations: Abdomen is soft.      Tenderness: There is no abdominal tenderness. There is no guarding.   Musculoskeletal: Normal range of motion.         General: No tenderness.      Cervical back: Normal range of motion. Skin:     General: Skin is warm.   Neurological:      Mental Status: Alert and oriented to person, place, and time.      Deep Tendon Reflexes: Reflexes are normal and symmetric.   Psychiatric:         Judgment: Judgment normal.         Procedures  2023    Interpretation Summary       •  Findings consistent with a normal ECG stress test.  •  Left ventricular ejection fraction is normal (Calculated EF = 70%).  •  Myocardial perfusion imaging indicates a normal myocardial perfusion study with no evidence of ischemia.  •  Impressions are consistent with a low risk study.     Interpretation Summary       •  Left ventricular ejection fraction appears to be 66 - 70%.  •  Left ventricular diastolic function was normal.  •   Estimated right ventricular systolic pressure from tricuspid regurgitation is normal (<35 mmHg).         Current Outpatient Medications:   •  amLODIPine (NORVASC) 5 MG tablet, , Disp: , Rfl:   •  isosorbide mononitrate (IMDUR) 30 MG 24 hr tablet, Take 1 tablet by mouth Daily., Disp: , Rfl:   •  levothyroxine (SYNTHROID, LEVOTHROID) 200 MCG tablet, Take 1 tablet by mouth Daily., Disp: , Rfl:   •  metoprolol succinate XL (TOPROL-XL) 25 MG 24 hr tablet, Take 1 tablet by mouth Daily., Disp: , Rfl:   •  potassium chloride (K-DUR,KLOR-CON) 20 MEQ CR tablet, , Disp: , Rfl:   •  sertraline (ZOLOFT) 100 MG tablet, Take 1 tablet by mouth Daily., Disp: , Rfl:   •  traZODone (DESYREL) 150 MG tablet, Take 1 tablet by mouth Every Night., Disp: , Rfl:   •  levothyroxine (SYNTHROID, LEVOTHROID) 150 MCG tablet, Take 1 tablet by mouth Every Morning for 30 days., Disp: 30 tablet, Rfl: 0     Assessment:        Patient Active Problem List   Diagnosis   • History of colon polyps   • Acute pancreatitis due to calculus of common bile duct   • Abdominal pain, generalized   • Closed left tibial fracture   • Closed bimalleolar fracture of left ankle   • Left tibial fracture   • Closed fracture of upper end of left fibula   • Immobility syndrome   • HTN (hypertension)   • Generalized abdominal pain   • Other constipation   • Diarrhea   • Hx of adenomatous colonic polyps   • Generalized weakness   • Severe hypothyroidism               Plan:   1.  Hospital follow-up for atypical chest pain.  Testing was negative at the hospital.  He was placed on Imdur and is doing better.    Risk reduction for the coronary artery disease, controlling the blood pressure, blood sugar management, cholesterol management, exercise, stress management, and proper compliance with medications and follow-up has been discussed      It was explained to the patient that stress testing carries 85% specificity/sensitivity, and does not rule out future cardiac event.      2.   Hypertension: Today in the office his blood pressures controlled on current medications.  Educated patient on exercising for at least 30 minutes a day for 2 to 3 days a week. Importance of controlling hypertension and blood pressure checkup on the regular basis has been explained. Hypertension as a silent killer has been discussed. Risk reduction of the weight and regular exercises to control the hypertension has been explained.      3. Hyperlipidemia: Lipid panel as above.  He states his primary care manages his lab work and cholesterol    Risk of the hyperlipidemia, importance of the treatment has been explained. Pros and cons of the statins has been explained. Regular blood workup as well as side effects including the liver failure, myelopathy death has been explained.    4. THA: he states he uses a machine.             No diagnosis found.    There are no diagnoses linked to this encounter.    COUNSELING: doris Elena was given to patient for the following topics: diagnostic results, risk factor reductions, impressions, risks and benefits of treatment options and importance of treatment compliance .       SMOKING COUNSELING: denies    Follow-up in 6 months, unless he needs to be seen sooner    Sincerely,   REZA Calhoun  [facility]  05/02/23  10:53 EDT    EMR Dragon/Transcription disclaimer:   Much of this encounter note is an electronic transcription/translation of spoken language to printed text. The electronic translation of spoken language may permit erroneous, or at times, nonsensical words or phrases to be inadvertently transcribed; Although I have reviewed the note for such errors, some may still exist.

## 2023-11-10 ENCOUNTER — OFFICE VISIT (OUTPATIENT)
Dept: CARDIOLOGY | Facility: CLINIC | Age: 73
End: 2023-11-10
Payer: MEDICARE

## 2023-11-10 VITALS
HEART RATE: 75 BPM | BODY MASS INDEX: 41.32 KG/M2 | HEIGHT: 69 IN | DIASTOLIC BLOOD PRESSURE: 79 MMHG | SYSTOLIC BLOOD PRESSURE: 135 MMHG | WEIGHT: 279 LBS

## 2023-11-10 DIAGNOSIS — E78.5 HYPERLIPIDEMIA, UNSPECIFIED HYPERLIPIDEMIA TYPE: ICD-10-CM

## 2023-11-10 DIAGNOSIS — I10 PRIMARY HYPERTENSION: ICD-10-CM

## 2023-11-10 DIAGNOSIS — G47.33 OBSTRUCTIVE SLEEP APNEA SYNDROME: Primary | ICD-10-CM

## 2023-11-10 PROCEDURE — 93000 ELECTROCARDIOGRAM COMPLETE: CPT | Performed by: NURSE PRACTITIONER

## 2023-11-10 PROCEDURE — 3075F SYST BP GE 130 - 139MM HG: CPT | Performed by: NURSE PRACTITIONER

## 2023-11-10 PROCEDURE — 1160F RVW MEDS BY RX/DR IN RCRD: CPT | Performed by: NURSE PRACTITIONER

## 2023-11-10 PROCEDURE — 1159F MED LIST DOCD IN RCRD: CPT | Performed by: NURSE PRACTITIONER

## 2023-11-10 PROCEDURE — 3078F DIAST BP <80 MM HG: CPT | Performed by: NURSE PRACTITIONER

## 2023-11-10 PROCEDURE — 99213 OFFICE O/P EST LOW 20 MIN: CPT | Performed by: NURSE PRACTITIONER

## 2023-11-10 RX ORDER — PANTOPRAZOLE SODIUM 40 MG/1
40 TABLET, DELAYED RELEASE ORAL DAILY
COMMUNITY
Start: 2023-10-09

## 2023-11-10 NOTE — PROGRESS NOTES
Subjective:        Nicanor Haley is a 73 y.o. male who here for follow up    Chief Complaint   Patient presents with    Follow-up     6 month f/u        HPI  This is a 73-year-old male who is known to this provider.  He has a history of hypertension, heart arthritis, hypothyroidism, sleep apnea, hyperlipidemia and rheumatoid arthritis.  Here today for a 1 year follow-up.  He is EKG shows sinus rhythm with a heart rate of 72.    Stress test indicated a normal myocardial perfusion study with no evidence of ischemia.  His EF on his echo revealed 66 to 70% ejection fraction and LV diastolic function was normal.  Lipid panel March 2023 revealed , HDL 47, LDL 81 and triglycerides 112.    The following portions of the patient's history were reviewed and updated as appropriate: allergies, current medications, past family history, past medical history, past social history, past surgical history and problem list.    Past Medical History:   Diagnosis Date    Arthritis     Colon cancer 2014    tubulovillous adenomas with adenocarcinoma in situ s/p right open colectomy    Colon polyps 08/06/2014    1) cecal polyps: fragments of adenomatous polyp with low grade-dysplasia 2) ascending colon mass bx: fragments of tubulovillous adenoma w/ low grade dysplasia 3) transverse colon polyp: fragments of adenomatous polyp with low grade dysplasia 4) sigmoid polyp: fragments of adenomatous polyp with low grade dysplasia    Depression     Gout     Hearing loss     Hyperlipemia     Hypertension     Hypothyroidism     Kidney stones     Polio     Rheumatoid arthritis     Sleep apnea          reports that he has never smoked. He has never used smokeless tobacco. He reports that he does not drink alcohol and does not use drugs.     Family History   Problem Relation Age of Onset    Lung cancer Mother     Hypertension Father     Diabetes Sister     Hypertension Brother     Hypertension Brother     Lung cancer Brother        ROS     Review  of Systems  Constitutional: No wt loss, fever, fatigue  Gastrointestinal: No nausea, abdominal pain  Behavioral/Psych: No insomnia or anxiety  Cardiovascular: Denies chest pain, shortness of breath.      Objective:           Constitutional:       Appearance: Well-developed.   Neck:      Vascular: No JVD.      Trachea: No tracheal deviation.   Pulmonary:      Effort: Pulmonary effort is normal. No respiratory distress.      Breath sounds: Normal breath sounds. No stridor. No decreased breath sounds. No wheezing. No rhonchi. No rales.   Chest:      Chest wall: Not tender to palpatation.   Cardiovascular:      Normal rate. Regular rhythm.      No gallop.    Pulses:     Intact distal pulses.   Edema:     Peripheral edema absent.   Abdominal:      General: Bowel sounds are normal. There is no distension.      Palpations: Abdomen is soft.      Tenderness: There is no abdominal tenderness.   Skin:     General: Skin is warm.   Neurological:      Mental Status: Alert and oriented to person, place, and time.      Deep Tendon Reflexes: Reflexes are normal and symmetric.           ECG 12 Lead    Date/Time: 11/10/2023 9:15 AM  Performed by: Dori Castanon APRN    Authorized by: Dori Castanon APRN  Comparison: compared with previous ECG from 3/25/2023  Similar to previous ECG  Rhythm: sinus rhythm  Rate: normal  BPM: 72    Clinical impression: non-specific ECG        Interpretation Summary         Findings consistent with a normal ECG stress test.    Left ventricular ejection fraction is normal (Calculated EF = 70%).    Myocardial perfusion imaging indicates a normal myocardial perfusion study with no evidence of ischemia.    Impressions are consistent with a low risk study    Interpretation Summary         Left ventricular ejection fraction appears to be 66 - 70%.    Left ventricular diastolic function was normal.    Estimated right ventricular systolic pressure from tricuspid regurgitation is normal (<35 mmHg).          Current Outpatient Medications:     isosorbide mononitrate (IMDUR) 30 MG 24 hr tablet, Take 1 tablet by mouth Daily., Disp: , Rfl:     levothyroxine (SYNTHROID, LEVOTHROID) 150 MCG tablet, Take 1 tablet by mouth Every Morning for 30 days., Disp: 30 tablet, Rfl: 0    metoprolol succinate XL (TOPROL-XL) 25 MG 24 hr tablet, Take 1 tablet by mouth Daily., Disp: , Rfl:     pantoprazole (PROTONIX) 40 MG EC tablet, Take 1 tablet by mouth Daily., Disp: , Rfl:     potassium chloride (K-DUR,KLOR-CON) 20 MEQ CR tablet, , Disp: , Rfl:     sertraline (ZOLOFT) 100 MG tablet, Take 1 tablet by mouth Daily., Disp: , Rfl:     traZODone (DESYREL) 150 MG tablet, Take 1 tablet by mouth Every Night., Disp: , Rfl:      Assessment:        Patient Active Problem List   Diagnosis    History of colon polyps    Acute pancreatitis due to calculus of common bile duct    Abdominal pain, generalized    Closed left tibial fracture    Closed bimalleolar fracture of left ankle    Left tibial fracture    Closed fracture of upper end of left fibula    Immobility syndrome    HTN (hypertension)    Generalized abdominal pain    Other constipation    Diarrhea    Hx of adenomatous colonic polyps    Generalized weakness    Severe hypothyroidism               Plan:   1.  History of atypical chest pain: Denies any chest pain.  Previous ischemic work-up as above.    2.  Hypertension: Today in the office blood pressures controlled on current medications.  No changes.    Educated patient on exercising for at least 30 minutes a day for 2 to 3 days a week. Importance of controlling hypertension and blood pressure checkup on the regular basis has been explained. Hypertension as a silent killer has been discussed. Risk reduction of the weight and regular exercises to control the hypertension has been explained.    3.  Hyperlipidemia: Lipid panel as above.  His primary care manages cholesterol labs.    Risk of the hyperlipidemia, importance of the treatment has  been explained. Pros and cons of the statins has been explained. Regular blood workup as well as side effects including the liver failure, myelopathy death has been explained.    4.  THA: He is not using it               No diagnosis found.    There are no diagnoses linked to this encounter.    COUNSELING: doris Elena was given to patient for the following topics: diagnostic results, risk factor reductions, impressions, risks and benefits of treatment options and importance of treatment compliance .       SMOKING COUNSELING: denies    He will follow-up in 1 year, unless he needs to be seen sooner.    Sincerely,   REZA Calhoun  [facility]  11/10/23  09:11 EST    EMR Dragon/Transcription disclaimer: Dictated utilizing Dragon Dictation

## 2024-05-06 ENCOUNTER — OFFICE VISIT (OUTPATIENT)
Dept: CARDIOLOGY | Facility: CLINIC | Age: 74
End: 2024-05-06
Payer: MEDICARE

## 2024-05-06 VITALS — HEIGHT: 69 IN | HEART RATE: 84 BPM | BODY MASS INDEX: 36.14 KG/M2 | WEIGHT: 244 LBS

## 2024-05-06 DIAGNOSIS — E78.5 HYPERLIPIDEMIA, UNSPECIFIED HYPERLIPIDEMIA TYPE: ICD-10-CM

## 2024-05-06 DIAGNOSIS — I10 PRIMARY HYPERTENSION: Primary | ICD-10-CM

## 2024-05-06 PROCEDURE — 99213 OFFICE O/P EST LOW 20 MIN: CPT | Performed by: INTERNAL MEDICINE

## 2024-05-06 RX ORDER — AMLODIPINE BESYLATE 5 MG/1
5 TABLET ORAL DAILY
COMMUNITY

## 2024-05-06 RX ORDER — NITROFURANTOIN 25; 75 MG/1; MG/1
100 CAPSULE ORAL 2 TIMES DAILY
COMMUNITY
Start: 2024-05-01

## 2024-05-06 RX ORDER — ONDANSETRON 4 MG/1
4 TABLET, FILM COATED ORAL EVERY 8 HOURS PRN
COMMUNITY

## 2024-05-06 RX ORDER — PSEUDOEPHED/ACETAMINOPH/DIPHEN 30MG-500MG
500 TABLET ORAL 2 TIMES DAILY
COMMUNITY
Start: 2024-04-21

## 2024-05-06 RX ORDER — OXYCODONE HYDROCHLORIDE AND ACETAMINOPHEN 5; 325 MG/1; MG/1
1 TABLET ORAL EVERY 8 HOURS PRN
COMMUNITY
Start: 2024-05-01 | End: 2024-05-31

## 2024-05-06 RX ORDER — CLOPIDOGREL BISULFATE 75 MG/1
75 TABLET ORAL DAILY
COMMUNITY

## 2024-05-06 RX ORDER — POLYETHYLENE GLYCOL 3350 17 G/17G
17 POWDER, FOR SOLUTION ORAL DAILY
COMMUNITY

## 2024-05-06 RX ORDER — DICYCLOMINE HYDROCHLORIDE 10 MG/1
10 CAPSULE ORAL AS NEEDED
COMMUNITY
Start: 2023-12-14

## 2024-05-06 RX ORDER — LEVOTHYROXINE SODIUM 0.2 MG/1
200 TABLET ORAL DAILY
COMMUNITY

## 2024-05-06 RX ORDER — TIZANIDINE 2 MG/1
2 TABLET ORAL EVERY 8 HOURS PRN
COMMUNITY
Start: 2024-04-29

## 2024-05-06 RX ORDER — ATORVASTATIN CALCIUM 80 MG/1
80 TABLET, FILM COATED ORAL DAILY
COMMUNITY

## 2024-05-06 NOTE — PROGRESS NOTES
Subjective:        Nicanor Haley is a 74 y.o. male who here for follow up    CC  Follow-up hypertension hyperlipidemia  HPI  Nursing home basis 74-year-old here for the follow-up for the hypertension and hyperlipidemia denies any chest pains or tightness in the chest     Problems Addressed this Visit          Cardiac and Vasculature    HTN (hypertension) - Primary    Relevant Medications    amLODIPine (NORVASC) 5 MG tablet    Hyperlipidemia    Relevant Medications    atorvastatin (LIPITOR) 80 MG tablet     Diagnoses         Codes Comments    Primary hypertension    -  Primary ICD-10-CM: I10  ICD-9-CM: 401.9     Hyperlipidemia, unspecified hyperlipidemia type     ICD-10-CM: E78.5  ICD-9-CM: 272.4           .    The following portions of the patient's history were reviewed and updated as appropriate: allergies, current medications, past family history, past medical history, past social history, past surgical history and problem list.    Past Medical History:   Diagnosis Date    Arthritis     Colon cancer 2014    tubulovillous adenomas with adenocarcinoma in situ s/p right open colectomy    Colon polyps 08/06/2014    1) cecal polyps: fragments of adenomatous polyp with low grade-dysplasia 2) ascending colon mass bx: fragments of tubulovillous adenoma w/ low grade dysplasia 3) transverse colon polyp: fragments of adenomatous polyp with low grade dysplasia 4) sigmoid polyp: fragments of adenomatous polyp with low grade dysplasia    Depression     Gout     Hearing loss     Hyperlipemia     Hypertension     Hypothyroidism     Kidney stones     Polio     Rheumatoid arthritis     Sleep apnea      reports that he has never smoked. He has never used smokeless tobacco. He reports that he does not drink alcohol and does not use drugs.   Family History   Problem Relation Age of Onset    Lung cancer Mother     Hypertension Father     Diabetes Sister     Hypertension Brother     Hypertension Brother     Lung cancer Brother   "      Review of Systems  Constitutional: No wt loss, fever, fatigue  Gastrointestinal: No nausea, abdominal pain  Behavioral/Psych: No insomnia or anxiety   Cardiovascular no chest pains or tightness in the chest  Objective:       Physical Exam  Pulse 84   Ht 175.3 cm (69\")   Wt 111 kg (244 lb) Comment: Verbal  BMI 36.03 kg/m²   General appearance: No acute changes   Neck: Trachea midline; NECK, supple, no thyromegaly or lymphadenopathy   Lungs: Normal size and shape, normal breath sounds, equal distribution of air, no rales and rhonchi   CV: S1-S2 regular, no murmurs, no rub, no gallop   Abdomen: Soft, nontender; no masses , no abnormal abdominal sounds   Extremities: No deformity , normal color , no peripheral edema   Skin: Normal temperature, turgor and texture; no rash, ulcers          Procedures      Echocardiogram:    Results for orders placed during the hospital encounter of 03/24/23    Adult Transthoracic Echo Complete W/ Cont if Necessary Per Protocol    Interpretation Summary    Left ventricular ejection fraction appears to be 66 - 70%.    Left ventricular diastolic function was normal.    Estimated right ventricular systolic pressure from tricuspid regurgitation is normal (<35 mmHg).          Current Outpatient Medications:     Acetaminophen Extra Strength 500 MG tablet, Take 1 tablet by mouth 2 (Two) Times a Day., Disp: , Rfl:     amLODIPine (NORVASC) 5 MG tablet, Take 1 tablet by mouth Daily., Disp: , Rfl:     atorvastatin (LIPITOR) 80 MG tablet, Take 1 tablet by mouth Daily., Disp: , Rfl:     clopidogrel (PLAVIX) 75 MG tablet, Take 1 tablet by mouth Daily., Disp: , Rfl:     Diclofenac Sodium (VOLTAREN) 1 % gel gel, Apply 2 g topically to the appropriate area as directed 2 (Two) Times a Day., Disp: , Rfl:     dicyclomine (BENTYL) 10 MG capsule, Take 1 capsule by mouth As Needed., Disp: , Rfl:     isosorbide mononitrate (IMDUR) 30 MG 24 hr tablet, Take 1 tablet by mouth Daily., Disp: , Rfl:     " levothyroxine (SYNTHROID, LEVOTHROID) 200 MCG tablet, Take 1 tablet by mouth Daily., Disp: , Rfl:     metoprolol succinate XL (TOPROL-XL) 25 MG 24 hr tablet, Take 1 tablet by mouth Daily., Disp: , Rfl:     nitrofurantoin, macrocrystal-monohydrate, (MACROBID) 100 MG capsule, Take 1 capsule by mouth 2 (Two) Times a Day., Disp: , Rfl:     ondansetron (ZOFRAN) 4 MG tablet, Take 1 tablet by mouth Every 8 (Eight) Hours As Needed for Nausea., Disp: , Rfl:     oxyCODONE-acetaminophen (PERCOCET) 5-325 MG per tablet, Take 1 tablet by mouth Every 8 (Eight) Hours As Needed for Moderate Pain., Disp: , Rfl:     polyethylene glycol (MiraLax) 17 g packet, Take 17 g by mouth Daily., Disp: , Rfl:     potassium chloride (K-DUR,KLOR-CON) 20 MEQ CR tablet, , Disp: , Rfl:     sertraline (ZOLOFT) 25 MG tablet, Take 1 tablet by mouth Daily., Disp: , Rfl:     tiZANidine (ZANAFLEX) 2 MG tablet, Take 1 tablet by mouth Every 8 (Eight) Hours As Needed., Disp: , Rfl:     traZODone (DESYREL) 150 MG tablet, Take 1 tablet by mouth Every Night., Disp: , Rfl:     levothyroxine (SYNTHROID, LEVOTHROID) 150 MCG tablet, Take 1 tablet by mouth Every Morning for 30 days., Disp: 30 tablet, Rfl: 0   Assessment:                Plan:          ICD-10-CM ICD-9-CM   1. Primary hypertension  I10 401.9   2. Hyperlipidemia, unspecified hyperlipidemia type  E78.5 272.4     1. Primary hypertension  Continue current treatment    2. Hyperlipidemia, unspecified hyperlipidemia type  Continue current treatment      SEE PRN AT NH  COUNSELING:    Nicanor Elena was given to patient for the following topics: diagnostic results, risk factor reductions, impressions, risks and benefits of treatment options and importance of treatment compliance .       SMOKING COUNSELING:        Dictated using Dragon dictation

## 2024-05-14 PROBLEM — E78.5 HYPERLIPIDEMIA: Status: ACTIVE | Noted: 2024-05-14

## 2024-09-05 NOTE — CONSULTS
[facility]  Cardiology Consult Note    Patient Identification:  Name: Nicanor Haley  Age: 73 y.o.  Sex: male  :  1950  MRN: [id number]             Requesting Physician: Dr Solorzano    Reason for Consultation / Chief Complaint: elevated troponin    History of Present Illness: MR. Haley is a 73 year old man with hypertension, hyperlipidemia, and sleep apnea. He is bed redden at home due to bad knees. He came to the emergency room yesterday with complaints of weakness and decreased PO intake. High sensitivity troponin noted to be 47 with repeat 69 then 57 then 56. Kidney function is normal. TSH significantly elevated 65.4 with free T4 0.4. CXR unremarkable. He was admitted for further evaluation. He is unable to take care of himself at home any longer and will need placement.       Comorbid cardiac risk factors: HTN, HLD    Past Medical History:  Past Medical History:   Diagnosis Date   • Arthritis    • Colon cancer (HCC)     tubulovillous adenomas with adenocarcinoma in situ s/p right open colectomy   • Colon polyps 2014    1) cecal polyps: fragments of adenomatous polyp with low grade-dysplasia 2) ascending colon mass bx: fragments of tubulovillous adenoma w/ low grade dysplasia 3) transverse colon polyp: fragments of adenomatous polyp with low grade dysplasia 4) sigmoid polyp: fragments of adenomatous polyp with low grade dysplasia   • Depression    • Gout    • Hearing loss    • Hyperlipemia    • Hypertension    • Hypothyroidism    • Kidney stones    • Polio    • Rheumatoid arthritis (HCC)    • Sleep apnea      Past Surgical History:  Past Surgical History:   Procedure Laterality Date   • CHOLECYSTECTOMY WITH INTRAOPERATIVE CHOLANGIOGRAM N/A 11/10/2017    Procedure: CHOLECYSTECTOMY LAPAROSCOPIC INTRAOPERATIVE CHOLANGIOGRAM;  Surgeon: Elian Carnes MD;  Location: [facility];  Service:    • COLECTOMY PARTIAL / TOTAL Right 2014    Open right colectomy-Dr. Elian Carnes   •  COLONOSCOPY N/A 3/15/2017    Procedure: COLONOSCOPY TO ANASTAMOSIS AND TI WITH COLD SNARE POLYPECTOMY ;  Surgeon: Elian Carnes MD;  Location: [facility] ENDOSCOPY;  Service:    • COLONOSCOPY N/A 08/06/2014    1 cm cecal polyp, 1 cm transverse polyp, 1 cm sigmoid colon polyp, mass in the ascending colon occupying 1/3 of the colonic circumference, mass proximal to the transverse colon occupying 1/4 colonic circumference-Dr. Elian Carnes   • COLONOSCOPY N/A 5/1/2019    Procedure: COLONOSCOPY to ILEOCOLIC ANASTAMOSIS WITH HOT SNARE/COLD BX POLYPECTOMY;  Surgeon: Elian Carnes MD;  Location: [facility] ENDOSCOPY;  Service: General   • FOOT SURGERY Right 2008, 2009   • GASTRIC BANDING N/A 12/28/2009   • NOSE SURGERY N/A 1989   • ORIF TIBIA/FIBULA FRACTURES Left 12/17/2017    Procedure: TIBIA/FIBULA OPEN REDUCTION INTERNAL FIXATION;  Surgeon: Lai Acevedo Jr., MD;  Location: [facility] MAIN OR;  Service:    • ORIF TIBIA/FIBULA FRACTURES Right    • TOTAL KNEE ARTHROPLASTY Bilateral 1/2002, 6/2002 1/2002-left knee 6/2002-right knee-Dr. Jaun Sosa [facility]      Allergies:  Allergies   Allergen Reactions   • Keflex [Cephalexin] Mental Status Change     Pt reports that it makes him violent (12/17/17)      Home Meds:  Medications Prior to Admission   Medication Sig Dispense Refill Last Dose   • Calcium Carb-Cholecalciferol 600-800 MG-UNIT chewable tablet Take 1 tablet by mouth 2 (Two) Times a Day.   Past Week   • diclofenac (VOLTAREN) 75 MG EC tablet Take 1 tablet by mouth Daily.   3/23/2023   • hydrochlorothiazide (HYDRODIURIL) 25 MG tablet Take 1 tablet by mouth Daily.   Past Week   • levothyroxine (SYNTHROID, LEVOTHROID) 125 MCG tablet Take 1 tablet by mouth 2 (Two) Times a Day.   Past Week   • metoprolol tartrate (LOPRESSOR) 25 MG tablet Take 1 tablet by mouth 2 (Two) Times a Day.   Past Week   • probenecid (BENEMID) 500 MG tablet Take 1 tablet by mouth 2 (Two) Times a Day.   Past Week   • sertraline (ZOLOFT) 100 MG  "tablet Take 1 tablet by mouth Daily.   Past Week   • simvastatin (ZOCOR) 40 MG tablet Take 1 tablet by mouth Daily.   Past Week   • traZODone (DESYREL) 150 MG tablet Take 1 tablet by mouth Every Night.   Past Week   • DULoxetine (CYMBALTA) 20 MG capsule Take 1 capsule by mouth Daily.   Unknown   • isosorbide mononitrate (IMDUR) 30 MG 24 hr tablet Take 1 tablet by mouth Daily.   Unknown     Current Meds:   [unfilled]  Social History:   Social History     Tobacco Use   • Smoking status: Never   • Smokeless tobacco: Never   Substance Use Topics   • Alcohol use: No      Family History:  Family History   Problem Relation Age of Onset   • Lung cancer Mother    • Hypertension Father    • Diabetes Sister    • Hypertension Brother    • Hypertension Brother    • Lung cancer Brother         Review of Systems  Constitutional: No wt loss, fever   Gastrointestinal: No nausea , abdominal pain  Behavioral/Psych: No insomnia or anxiety   Cardiovascular ----positive for cp. All other systems reviewed and are negative                          Physical Exam  BP (!) 119/106 (BP Location: Right arm, Patient Position: Lying)   Pulse 67   Temp 99.2 °F (37.3 °C) (Oral)   Resp 18   Ht 177.8 cm (70\")   Wt (!) 137 kg (302 lb)   SpO2 95%   BMI 43.33 kg/m²     General appearance: No acute changes   Eyes: Sclerae conjunctivae normal, pupils reactive   HENT: Atraumatic; oropharynx clear with moist mucous membranes and no mucosal ulcerations;  Neck: Trachea midline; NECK, supple, no thyromegaly or lymphadenopathy   Lungs: Normal size and shape, normal breath sounds, equal distribution of air, no rales and rhonchi   CV: S1-S2 regular, no murmurs, no rub, no gallop   Abdomen: Soft, nontender; no masses , no abnormal abdominal sounds   Extremities: No deformity , normal color , no peripheral edema   Skin: Normal temperature, turgor and texture; no rash, ulcers  Psych: Appropriate affect, alert and oriented to person, place and time "                       Cardiographics  ECG: .        Echocardiogram: pening    Imaging  Chest X-ray:   FINDINGS: The heart size is borderline. Aorta is calcified. Pulmonary  vasculature is unremarkable. No focal pulmonary consolidation, pleural  effusion, or pneumothorax. No acute osseous process.     IMPRESSION:  No focal pulmonary consolidation. Borderline heart size.  Follow-up as clinical indications persist.         Lab Review   Results from last 7 days   Lab Units 03/25/23  0815 03/25/23  0613 03/24/23  1729   HSTROP T ng/L 56* 57* 69*         Results from last 7 days   Lab Units 03/25/23  0613   SODIUM mmol/L 140   POTASSIUM mmol/L 3.1*   BUN mg/dL 18   CREATININE mg/dL 1.01   CALCIUM mg/dL 9.6     @[id number]@  Results from last 7 days   Lab Units 03/25/23  0613 03/24/23  1515   WBC 10*3/mm3 5.51 4.71   HEMOGLOBIN g/dL 12.5* 13.3   HEMATOCRIT % 38.6 39.8   PLATELETS 10*3/mm3 132* 131*             Assessment:  Musculoskeletal chest pain worse with pressure  Borderline elevated troponin  Abnormal EKG  Obesity  Hypertension      Recommendations / Plan:   The chest pains are more atypical worse with the pressure.  Pains with rest  Elevated troponins appears to be borderline  Pains appears to be noncardiac    We will proceed with a stress test    Specificity and sensitivity of the stress test/ cardiac workup has been explained. Pt has been explained if  Symptoms continue please go to ER, and further w/p will be required.    Also explained this does not rule out coronary artery disease or the future events, continue to emphasize on risk reductions for coronary artery disease    Pt also advised to contact PCP for other causes of symptoms            Crissy Melendrez MD  3/25/2023, 10:03 EDT      EMR Dragon/Transcription:   Dictated utilizing Dragon dictation     88

## 2024-12-09 NOTE — TELEPHONE ENCOUNTER
"Tricia Talleyayan" Edgardo was seen and treated in our emergency department on 12/9/2024.  She may return to work on 12/10/2024.       If you have any questions or concerns, please don't hesitate to call.      Radha Bishop PA-C" Pt states he is having an allergic reaction to the recently prescribed Cipro and Flagyl. . He states he is very dizzy. Does he discontinue? And/or do you want to prescribe something else?

## (undated) DEVICE — ENDOCUT SCISSOR TIP, DISPOSABLE: Brand: RENEW

## (undated) DEVICE — SNAR POLYP SENSATION STDOVL 27 240 BX40

## (undated) DEVICE — THE TORRENT IRRIGATION SCOPE CONNECTOR IS USED WITH THE TORRENT IRRIGATION TUBING TO PROVIDE IRRIGATION FLUIDS SUCH AS STERILE WATER DURING GASTROINTESTINAL ENDOSCOPIC PROCEDURES WHEN USED IN CONJUNCTION WITH AN IRRIGATION PUMP (OR ELECTROSURGICAL UNIT).: Brand: TORRENT

## (undated) DEVICE — STPCK 3WY D201 DISCOFIX

## (undated) DEVICE — DRAPE,REIN 53X77,STERILE: Brand: MEDLINE

## (undated) DEVICE — DRP C/ARM 41X74IN

## (undated) DEVICE — CANNULATED DRILL

## (undated) DEVICE — GOWN,PREVENTION PLUS,LARGE,STERILE: Brand: MEDLINE

## (undated) DEVICE — 2 TIP PRE-SHAPED 45 MICROCATHER: Brand: EXCELSIOR 1018

## (undated) DEVICE — Device: Brand: DEFENDO AIR/WATER/SUCTION AND BIOPSY VALVE

## (undated) DEVICE — PROXIMATE RH ROTATING HEAD SKIN STAPLERS (35 WIDE) CONTAINS 35 STAINLESS STEEL STAPLES: Brand: PROXIMATE

## (undated) DEVICE — ENDOPATH PNEUMONEEDLE INSUFFLATION NEEDLES WITH LUER LOCK CONNECTORS 120MM: Brand: ENDOPATH

## (undated) DEVICE — Device

## (undated) DEVICE — SOL ISO/ALC RUB 70PCT 4OZ

## (undated) DEVICE — CONTAINER,SPECIMEN,OR STERILE,4OZ: Brand: MEDLINE

## (undated) DEVICE — DRSNG GZ CURAD XEROFORM NONADHS 5X9IN STRL

## (undated) DEVICE — SPNG LAP 18X18IN LF STRL PK/5

## (undated) DEVICE — NON-OCCLUSIVE GAUZE STRIP OVERWRAP,3% BISMUTH TRIBROMOPHENATE IN OIL EMULSION: Brand: XEROFORM

## (undated) DEVICE — SINGLE-USE BIOPSY FORCEPS: Brand: RADIAL JAW 4

## (undated) DEVICE — ENCORE® LATEX ORTHO SIZE 7.5, STERILE LATEX POWDER-FREE SURGICAL GLOVE: Brand: ENCORE

## (undated) DEVICE — DRP C/ARMOR

## (undated) DEVICE — CANN NASL CO2 TRULINK W/O2 A/

## (undated) DEVICE — SKIN PREP TRAY W/CHG: Brand: MEDLINE INDUSTRIES, INC.

## (undated) DEVICE — CORTEX SCREW
Type: IMPLANTABLE DEVICE | Site: TIBIA | Status: NON-FUNCTIONAL
Brand: AXSOS
Removed: 2017-12-17

## (undated) DEVICE — SPNG GZ WOVN 4X4IN 12PLY 10/BX STRL

## (undated) DEVICE — ENCORE® LATEX ORTHO SIZE 8, STERILE LATEX POWDER-FREE SURGICAL GLOVE: Brand: ENCORE

## (undated) DEVICE — STPLR SKIN VISISTAT WD 35CT

## (undated) DEVICE — DRILL BIT AO FITTING

## (undated) DEVICE — THE SINGLE USE ETRAP – POLYP TRAP IS USED FOR SUCTION RETRIEVAL OF ENDOSCOPICALLY REMOVED POLYPS.: Brand: ETRAP

## (undated) DEVICE — JACKSON-PRATT 100CC BULB RESERVOIR: Brand: CARDINAL HEALTH

## (undated) DEVICE — KIRSCHNER WIRE
Type: IMPLANTABLE DEVICE | Site: FIBULA | Status: NON-FUNCTIONAL
Removed: 2017-12-17

## (undated) DEVICE — TUBING, SUCTION, 1/4" X 10', STRAIGHT: Brand: MEDLINE

## (undated) DEVICE — GLV SURG BIOGEL LTX PF 6

## (undated) DEVICE — DISPOSABLE TOURNIQUET CUFF SINGLE BLADDER, SINGLE PORT AND QUICK CONNECT CONNECTOR: Brand: COLOR CUFF

## (undated) DEVICE — THREADED GUIDE WIRE

## (undated) DEVICE — SUT VIC 5/0 PS2 18IN J495H

## (undated) DEVICE — SUT ETHLN 3/0 PS1 18IN 1663H

## (undated) DEVICE — BNDG ELAS ELITE V/CLOSE 6IN 5YD LF STRL

## (undated) DEVICE — ENDOPATH XCEL BLADELESS TROCARS WITH STABILITY SLEEVES: Brand: ENDOPATH XCEL

## (undated) DEVICE — SUT VIC 0 TN 27IN DYED JTN0G

## (undated) DEVICE — KIRSCHNER WIRE
Type: IMPLANTABLE DEVICE | Site: TIBIA | Status: NON-FUNCTIONAL
Removed: 2017-12-17

## (undated) DEVICE — PK ORTHO MAJ 40

## (undated) DEVICE — ADHS SKIN DERMABOND TOP ADVANCED

## (undated) DEVICE — PAD,ABDOMINAL,8"X10",ST,LF: Brand: MEDLINE

## (undated) DEVICE — DRILL BIT LOCKING, MEDIUM: Brand: AXSOS

## (undated) DEVICE — UNDERCAST PADDING: Brand: DEROYAL

## (undated) DEVICE — SOL NACL 0.9PCT 1000ML

## (undated) DEVICE — DRILL BIT NON-LOCKING, SHORT: Brand: AXSOS

## (undated) DEVICE — DRILL BIT, AO DIA2.6MM X 135MM, SCALED: Brand: VARIAX

## (undated) DEVICE — ADHS SKIN DERMABOND

## (undated) DEVICE — ENDOPOUCH RETRIEVER SPECIMEN RETRIEVAL BAGS: Brand: ENDOPOUCH RETRIEVER

## (undated) DEVICE — T-DRAPE,EXTREMITY,STERILE: Brand: MEDLINE

## (undated) DEVICE — GLV SURG SENSICARE GREEN W/ALOE PF LF 8 STRL

## (undated) DEVICE — BNDG ESMARK STRL 6INX12FT LF

## (undated) DEVICE — CATH IV INSYTE AUTOGARD 14G 1 1/2IN ORNG

## (undated) DEVICE — LOU LAP CHOLE: Brand: MEDLINE INDUSTRIES, INC.

## (undated) DEVICE — APPL CHLORAPREP W/TINT 26ML ORNG

## (undated) DEVICE — EXTENSION SET, MALE LUER LOCK ADAPTER WITH RETRACTABLE COLLAR

## (undated) DEVICE — GOWN ,SIRUS,NONREINFORCED SMALL: Brand: MEDLINE

## (undated) DEVICE — CATH CHOLANG 4.5F18IN BRGNDY